# Patient Record
Sex: FEMALE | Race: WHITE | NOT HISPANIC OR LATINO | Employment: OTHER | URBAN - METROPOLITAN AREA
[De-identification: names, ages, dates, MRNs, and addresses within clinical notes are randomized per-mention and may not be internally consistent; named-entity substitution may affect disease eponyms.]

---

## 2017-01-12 ENCOUNTER — TELEPHONE (OUTPATIENT)
Dept: UROGYNECOLOGY | Facility: CLINIC | Age: 77
End: 2017-01-12

## 2017-01-12 NOTE — TELEPHONE ENCOUNTER
----- Message from Aura Jean sent at 1/10/2017  4:24 PM CST -----  Contact: Patient  x_ 1st Request  _ 2nd Request  _ 3rd Request    Who: NANCY BAGLEY [[id number]]    Why: Patient is returning a call from staff. Patient is needing a call back.    What Number to Call Back: Patient can be reached at [phone].    When to Expect a call back: (Before the end of the day)  -- if call after 3:00 call back will be tomorrow.

## 2017-01-13 ENCOUNTER — TELEPHONE (OUTPATIENT)
Dept: INTERNAL MEDICINE | Facility: CLINIC | Age: 77
End: 2017-01-13

## 2017-01-13 RX ORDER — NITROFURANTOIN 25; 75 MG/1; MG/1
100 CAPSULE ORAL 2 TIMES DAILY
Qty: 20 CAPSULE | Refills: 0 | Status: SHIPPED | OUTPATIENT
Start: 2017-01-13 | End: 2017-01-23

## 2017-01-13 NOTE — TELEPHONE ENCOUNTER
----- Message from Sapna Christian sent at 1/13/2017 12:32 PM CST -----  Contact: pt   X_  1st Request  _  2nd Request  _  3rd Request    Who:NANCY BAGLEY [[id number]]    Why: Patient states she would like to schedule a follow up visit Patient would also like to get a RX called in for a bladder infection called in to [facility] - [place] - [address]    What Number to Call Back: [phone]    When to Expect a call back: (Before the end of the day)   -- if call after 3:00 call back will be tomorrow.

## 2017-01-13 NOTE — TELEPHONE ENCOUNTER
Spoke with pt she states that she is having some U.T.I sympts. She states that she's also been having a slow flow(dribbels) urinary freq, urgency,pain while urinating. Pt also states that in November she had the same sympts. Pt is requesting a abx to be sent to her pharmacy. Please advise.

## 2017-01-14 NOTE — TELEPHONE ENCOUNTER
Spoke with patient.  PCP called in macrobid.  Advised her to see local MD to collect urine C&S if doesn't resolve in case not sensitive to macrobid.  No s/sx pyleo; given precautions.     Patient had CVA in June 2016.  Had unDx Afib. In Nov 2016 had card stent.      IS also having some PV dribbling, weak stream.  Is worried that she might have some changes.  Will assess at next visit 1/25.

## 2017-01-25 ENCOUNTER — OFFICE VISIT (OUTPATIENT)
Dept: UROGYNECOLOGY | Facility: CLINIC | Age: 77
End: 2017-01-25
Payer: MEDICARE

## 2017-01-25 ENCOUNTER — HOSPITAL ENCOUNTER (OUTPATIENT)
Dept: RADIOLOGY | Facility: OTHER | Age: 77
Discharge: HOME | End: 2017-01-25
Attending: NURSE PRACTITIONER
Payer: MEDICARE

## 2017-01-25 VITALS
DIASTOLIC BLOOD PRESSURE: 70 MMHG | SYSTOLIC BLOOD PRESSURE: 130 MMHG | WEIGHT: 160 LBS | BODY MASS INDEX: 25.71 KG/M2 | HEIGHT: 66 IN

## 2017-01-25 DIAGNOSIS — R39.15 URINARY URGENCY: Primary | ICD-10-CM

## 2017-01-25 DIAGNOSIS — N39.46 MIXED STRESS AND URGE URINARY INCONTINENCE: ICD-10-CM

## 2017-01-25 DIAGNOSIS — M94.9 DISORDER OF BONE AND CARTILAGE: ICD-10-CM

## 2017-01-25 DIAGNOSIS — N95.1 MENOPAUSAL SYMPTOMS: ICD-10-CM

## 2017-01-25 DIAGNOSIS — N81.89 PELVIC FLOOR WEAKNESS: ICD-10-CM

## 2017-01-25 DIAGNOSIS — N95.2 VAGINAL ATROPHY: ICD-10-CM

## 2017-01-25 DIAGNOSIS — M89.9 DISORDER OF BONE AND CARTILAGE: ICD-10-CM

## 2017-01-25 PROCEDURE — 99999 PR PBB SHADOW E&M-EST. PATIENT-LVL III: CPT | Mod: PBBFAC,,, | Performed by: OBSTETRICS & GYNECOLOGY

## 2017-01-25 PROCEDURE — 99213 OFFICE O/P EST LOW 20 MIN: CPT | Mod: S$PBB,,, | Performed by: OBSTETRICS & GYNECOLOGY

## 2017-01-25 PROCEDURE — 77080 DXA BONE DENSITY AXIAL: CPT | Mod: 26,,, | Performed by: RADIOLOGY

## 2017-01-25 PROCEDURE — 77080 DXA BONE DENSITY AXIAL: CPT | Mod: TC

## 2017-01-25 PROCEDURE — 99213 OFFICE O/P EST LOW 20 MIN: CPT | Mod: PBBFAC | Performed by: OBSTETRICS & GYNECOLOGY

## 2017-01-25 RX ORDER — PRAVASTATIN SODIUM 20 MG/1
TABLET ORAL
COMMUNITY
Start: 2017-01-05 | End: 2017-04-25 | Stop reason: SDUPTHER

## 2017-01-25 RX ORDER — ASPIRIN 81 MG/1
162 TABLET ORAL
COMMUNITY
End: 2018-04-10 | Stop reason: SDUPTHER

## 2017-01-25 RX ORDER — AMIODARONE HYDROCHLORIDE 200 MG/1
TABLET ORAL
COMMUNITY
Start: 2017-01-05 | End: 2018-04-10 | Stop reason: SDUPTHER

## 2017-01-25 RX ORDER — CLOPIDOGREL BISULFATE 75 MG/1
75 TABLET ORAL
COMMUNITY
End: 2018-04-10

## 2017-01-25 NOTE — MR AVS SNAPSHOT
[facility]  [address] [address]  Phone: [phone]                  Theresa Hook   2017 11:00 AM   Office Visit    Description:  Female : 1940   Provider:  Reyna Wheeler MD   Department:  [facility]           Reason for Visit     Follow-up           Diagnoses this Visit        Comments    Urinary urgency    -  Primary     Pelvic floor weakness         Vaginal atrophy         Disorder of bone and cartilage         Menopausal symptoms                To Do List           Goals (5 Years of Data)     None      [facility]     [facility] -  Assistance  Registered nurses in the [facility] provide clinical advisement, health education, appointment booking, and other advisory services.  Call for this free service at [phone].             Medications           Message regarding Medications     Verify the changes and/or additions to your medication regime listed below are the same as discussed with your clinician today.  If any of these changes or additions are incorrect, please notify your healthcare provider.        STOP taking these medications     diazepam (VALIUM) 10 MG Tab     lisinopril 10 MG tablet Take 1 tablet (10 mg total) by mouth once daily. 1 Tablet Oral Every morning           Verify that the below list of medications is an accurate representation of the medications you are currently taking.  If none reported, the list may be blank. If incorrect, please contact your healthcare provider. Carry this list with you in case of emergency.           Current Medications     aspirin (ASPIRIN LOW DOSE) 81 MG EC tablet 2 Tablet, Delayed Release (E.C.) Oral At bedtime    aspirin (ECOTRIN) 81 MG EC tablet Take 162 mg by mouth.    clopidogrel (PLAVIX) 75 mg tablet Take 75 mg by mouth.    pravastatin (PRAVACHOL) 20 MG tablet     triamcinolone acetonide 0.1% (KENALOG) 0.1 % cream Apply topically  "Twice daily.   disp:  60g tube AAA      amiodarone (PACERONE) 200 MG Tab     ammonium lactate 12 % Crea     clobetasol (TEMOVATE) 0.05 % external solution Apply topically. .  AAA scalp qd - bid    mometasone (ELOCON) 0.1 % cream Apply 1 application topically Use as needed.   face           Clinical Reference Information           Vital Signs - Last Recorded  Most recent update: 1/25/2017 11:27 AM by Enid Mac LPN    BP Ht Wt BMI       130/70 (BP Location: Left arm) 5' 6" (1.676 m) 72.6 kg (160 lb) 25.82 kg/m2       Blood Pressure          Most Recent Value    BP  130/70      Allergies as of 1/25/2017     Collagen    Erythromycin    Quinolones    Sulfa (Sulfonamide Antibiotics)    Tetracycline      Immunizations Administered on Date of Encounter - 1/25/2017     None      Orders Placed During Today's Visit      Normal Orders This Visit    Urine culture     Future Labs/Procedures Expected by Expires    DXA Bone Density Spine And Hip_Axial Skeleton  1/25/2017 1/25/2018      Instructions    1. Well women   --bone density ordered today    2.   Mixed urinary incontinence, urge > stress:    --Empty bladder every 3 hours.  Empty well: wait a minute, lean forward on toilet.    --Avoid dietary irritants (see sheet).  Keep diary x 3-5 days to determine your irritants.  --KEGELS: do 10 in AM and 10 in PM, holding each x 10 seconds.  When you feel urge to go, STOP, KEGEL, and when urge has passed, then go to bathroom.  We recommend pelvic floor physical therapy-- patient refuses at this time.     --URGE: consider anticholinergic.  Takes 2-4 weeks to see if will have effect.  For dry mouth: get sour, sugar free lozenge or gum.    --STRESS:  Pessary vs. Sling.     3. Menopausal symptoms:  --could try non-HRT in future; patient would like to wait, does not want to add meds right now    4.  Continue fiber gummies/high fiber diet.  Always try to minimize straining with bowel movements.     5.  RTC 1 year       "

## 2017-01-25 NOTE — PATIENT INSTRUCTIONS
1. Well women   --bone density ordered today    2.   Mixed urinary incontinence, urge > stress:    --Empty bladder every 3 hours.  Empty well: wait a minute, lean forward on toilet.    --Avoid dietary irritants (see sheet).  Keep diary x 3-5 days to determine your irritants.  --KEGELS: do 10 in AM and 10 in PM, holding each x 10 seconds.  When you feel urge to go, STOP, KEGEL, and when urge has passed, then go to bathroom.  We recommend pelvic floor physical therapy-- patient refuses at this time.     --URGE: consider anticholinergic.  Takes 2-4 weeks to see if will have effect.  For dry mouth: get sour, sugar free lozenge or gum.    --STRESS:  Pessary vs. Sling.     3. Menopausal symptoms:  --could try non-HRT in future; patient would like to wait, does not want to add meds right now    4.  Continue fiber gummies/high fiber diet.  Always try to minimize straining with bowel movements.     5.  RTC 1 year

## 2017-01-25 NOTE — PROGRESS NOTES
Urogyn follow up    [facility]  [address] [address]    Theresa Hook  [id number]  1940      Theresa Hook is a 76 y.o. here for a urogyn follow up.    10/22/15    Title of Operation:  1) Laparoscopic-assisted total vaginal hysterectomy  2) Laparoscopic bilateral salpingo-oophorectomy  3) Bilateral uterosacral vaginal vault suspension  4) Anterior repair  5) Placement of tension-free transobuturator midurethral sling, TOT/Monarc  6) Cystourethroscopy    Indications for Surgery:  1) UI: (+) SHAHLA (when really has sneezing/coughing) > (+) UUI (1st AM). (+) pads (liner):1/day, usually damp. Daytime frequency: Q 4-5 hours. Nocturia: No: (--) dysuria, (--) hematuria, (--) frequent UTIs. (--) complete bladder emptying. DV occasionally--small 2nd void. +slow stream.   2) POP: Present. below introitus. Symptoms:(--) +can feel it, but doesn't really bother. (--) vaginal bleeding. (--) vaginal discharge. (--) sexually active. (--) Vaginal dryness. (--) vaginal estrogen use. History of breast cancer- +E2 - radiation treatment only. She is not interested in pessary placement.   --POP-Q: Aa +3; Ba +3; C -5; Ap -1; Bp -1; D -7. Genital hiatus 3, perineal body 1, total vaginal length 10-11.  3) BM: (--) constipation/straining. (--) chronic diarrhea. (--) hematochezia. (--) fecal incontinence. (+) fecal smearing/urgency. (--) incomplete evacuation. Eats high fiber diet.    Changes from last visit:  1) UI: (+) SHAHLA (+) UUI (+) pads:2/day, usually minimum wetness Daytime frequency: Q 3 -4hours. Nocturia: No: . (--) dysuria, (--) hematuria, (--) frequent UTIs. (--) complete bladder emptying. Double voids. Did not go to pelvic floor PT.  --3. URETHRAL FUNCTION/STORAGE PHASE:  a. WITH prolapse reduction:  · CLPP (150 mL): Negative at 81 cm H20  · VLPP (150 mL): Negative at 71 cm H20  · CLPP (300 mL): Negative at 122 cm H20  · VLPP (300 mL): Negative at 91 cm H20  · CLPP (MAX ):  Negative at 105 cm H20  · VLPP (MAX): Negative at 85 cm H20  · With pves catheters removed: POS CLPP and VLPP at max cap  These findings are consistent with Positive urodynamic stress incontinence only at max cap with pves catheter removed.  Assessment:  UF normal. PF with elevated PVR, but other studies (clinic PVR and void after cysto) confirmed normal emptying. Compliance normal. Max capacity ~650 mL. DO (--). SHAHLA (+) only at max cap once pves catheter removed.     2) POP: Present. below introitus. Symptoms:(--) (--) vaginal bleeding. (--) vaginal discharge. (--) sexually active. (+) Vaginal dryness. (--) vaginal estrogen use.   --POP-Q: Aa +3; Ba +3; C -5; Ap -1; Bp -1; D -7. Genital hiatus 3, perineal body 1, total vaginal length 10-11.    3) BM: (--) constipation/straining. (--) chronic diarrhea. (--) hematochezia. (--) fecal incontinence. (+) fecal smearing/urgency. (--) incomplete evacuation. Does not use fiber supplementation.    4) h/o left BRCA:  Pelvic US 9/15:  The uterus is normal in size, measuring 7.5 x 3.1 x 4.2 cm. The endometrial stripe is uniform in thickness measuring 2 mm. Multiple scattered dystrophic calcifications are noted throughout the uterine parenchyma, similar to the prior. Numerous   nabothian cysts are again noted. The ovaries are not visualized possibly secondary to their small size or surgical removal.  --has has had some abd distension since POP started; has gone up clothing size; no N/V    Preoperative Diagnosis:  1.  Rectocele      2.  Vaginal atrophy     3.  Uterovaginal prolapse, incomplete     4.  Cystocele     5.  Mixed incontinence urge and stress (male)(female)       Postoperative Diagnosis:  1.  Rectocele      2.  Vaginal atrophy     3.  Uterovaginal prolapse, incomplete     4.  Cystocele     5.  Mixed incontinence urge and stress (male)(female)         Issues include:  Patient Active Problem List   Diagnosis    HTN (hypertension)    Interval gout    Breast cancer,  2005, left breast lumpectomy x 2, lymph nodes negative, XRT, tamoxifem 5 years.    Skin cancer, 2005 squamous cell right nasolabial fold.    [place] (hard of hearing)    Vaginal atrophy    Cystocele    Rectocele    Mixed stress and urge urinary incontinence    Uterovaginal prolapse    S/P laparoscopic assisted vaginal hysterectomy (LAVH)    Nocturia    Personal history of breast cancer    Urinary urgency    Pelvic floor weakness     History since last visit: Denies bleeding or discharge.  No pain, VB.  No s/sx POP.  Complains of hot flashes at night 2-3 times/ night.   Bladder issues: Rare UUI in the early morning hours when she wakes up to void  Feels like she is not emptying her bladder any more.  Complains of a slow stream. Complains of abdominal bloating.  Denies dysuria.  Voiding every 3-4 hours during the day.  Treated for UTI 2 weeks ago-- no urine collected  Bowel issues: No constipation. Managing with high fiber diet and gummies as needed.    She has had a CVA in 06/2016 and had cardiac stent placed 11/2016.    Well Woman:  Pap:2009-- normal-- post hysterectomy  Mammo:12/2016  Stable post-surgical scar in the left breast is benign-negative.  No suspicious finding in either breast.  Mammogram in 1 year is recommended.  Colonoscopy:10/2012- normal-- repeat 2019  Dexa:years ago      Medications:    Current Outpatient Prescriptions:     aspirin (ASPIRIN LOW DOSE) 81 MG EC tablet, 2 Tablet, Delayed Release (E.C.) Oral At bedtime, Disp: , Rfl:     aspirin (ECOTRIN) 81 MG EC tablet, Take 162 mg by mouth., Disp: , Rfl:     clopidogrel (PLAVIX) 75 mg tablet, Take 75 mg by mouth., Disp: , Rfl:     pravastatin (PRAVACHOL) 20 MG tablet, , Disp: , Rfl:     triamcinolone acetonide 0.1% (KENALOG) 0.1 % cream, Apply topically Twice daily.   disp:  60g tube AAA  , Disp: , Rfl:     amiodarone (PACERONE) 200 MG Tab, , Disp: , Rfl:     ammonium lactate 12 % Crea, , Disp: , Rfl:     clobetasol (TEMOVATE) 0.05 %  "external solution, Apply topically. .  AAA scalp qd - bid, Disp: , Rfl:     mometasone (ELOCON) 0.1 % cream, Apply 1 application topically Use as needed.   face, Disp: , Rfl:     ROS:  As per HPI.      Exam  Visit Vitals    /70 (BP Location: Left arm)    Ht 5' 6" (1.676 m)    Wt 72.6 kg (160 lb)    BMI 25.82 kg/m2     General: alert and oriented, no acute distress  Respiratory: normal respiratory effort  Abd: soft, non-tender, non-distended.      Pelvic  Ext. Genitalia: normal external genitalia. Normal bartholin's and skenes glands  Vagina: + atrophy. Normal vaginal mucosa without lesions. No discharge noted.   Non-tender bladder base without palpable mass.  No mesh palpable/visible.    Cervix: absent  Uterus:  absent   Urethra: no masses or tenderness  Urethral meatus: no lesions, caruncle or prolapse.    POP-Q: Aa/Ba -1; C -9; Ba/Bp -2, TVL 11 cm.           PVR 20 cc    Impression  1. Urinary urgency  Urine culture   2. Pelvic floor weakness     3. Vaginal atrophy     4. Disorder of bone and cartilage  DXA Bone Density Spine And Hip_Axial Skeleton   5. Menopausal symptoms     6. Mixed stress and urge urinary incontinence       We reviewed the above issues and discussed options for short-term versus long-term management of her problems.   Plan:   1. Well women   --bone density ordered today    2.   Mixed urinary incontinence, urge > stress:    --Empty bladder every 3 hours.  Empty well: wait a minute, lean forward on toilet.    --Avoid dietary irritants (see sheet).  Keep diary x 3-5 days to determine your irritants.  --KEGELS: do 10 in AM and 10 in PM, holding each x 10 seconds.  When you feel urge to go, STOP, KEGEL, and when urge has passed, then go to bathroom.  We recommend pelvic floor physical therapy-- patient refuses at this time.     --URGE: consider anticholinergic.  Takes 2-4 weeks to see if will have effect.  For dry mouth: get sour, sugar free lozenge or gum.    --STRESS:  Pessary vs. " Sling.     3. Menopausal symptoms:  --could try non-HRT in future; patient would like to wait, does not want to add meds right now    4.  Continue fiber gummies/high fiber diet.  Always try to minimize straining with bowel movements.     5.  RTC 1 year    .    30 minutes were spent in face to face time with this patient  90 % of this time was spent in counseling and/or coordination of care    MIGUELINA Esquivel-BC [facility]  Division of Female Pelvic Medicine and Reconstructive Surgery  Department of Obstetrics & Gynecology

## 2017-01-26 ENCOUNTER — TELEPHONE (OUTPATIENT)
Dept: UROGYNECOLOGY | Facility: CLINIC | Age: 77
End: 2017-01-26

## 2017-01-26 NOTE — TELEPHONE ENCOUNTER
Informed pt that her bone density test results was normal. Pt voiced understanding and call ended.

## 2017-01-26 NOTE — TELEPHONE ENCOUNTER
----- Message from Reyna Wheeler MD sent at 1/25/2017  8:10 PM CST -----  Please let patient know bone density test was normal.  Results released to [employer] portal.  Thanks!

## 2017-01-29 PROBLEM — R39.15 URINARY URGENCY: Status: ACTIVE | Noted: 2017-01-29

## 2017-01-29 PROBLEM — N81.89 PELVIC FLOOR WEAKNESS: Status: ACTIVE | Noted: 2017-01-29

## 2017-02-01 ENCOUNTER — TELEPHONE (OUTPATIENT)
Dept: INTERNAL MEDICINE | Facility: CLINIC | Age: 77
End: 2017-02-01

## 2017-02-01 DIAGNOSIS — R30.0 DYSURIA: Primary | ICD-10-CM

## 2017-02-01 RX ORDER — NITROFURANTOIN 25; 75 MG/1; MG/1
100 CAPSULE ORAL 2 TIMES DAILY
Qty: 20 CAPSULE | Refills: 0 | Status: SHIPPED | OUTPATIENT
Start: 2017-02-01 | End: 2017-02-11

## 2017-02-24 ENCOUNTER — TELEPHONE (OUTPATIENT)
Dept: INTERNAL MEDICINE | Facility: CLINIC | Age: 77
End: 2017-02-24

## 2017-02-24 DIAGNOSIS — N39.0 URINARY TRACT INFECTION WITHOUT HEMATURIA, SITE UNSPECIFIED: Primary | ICD-10-CM

## 2017-02-24 NOTE — TELEPHONE ENCOUNTER
----- Message from Sabrina Sepulveda sent at 2/24/2017  1:45 PM CST -----  Contact: Patient  The patient says an order was supposed to have been sent for a culture for a UTI after two rounds of antibiotics.  Please fax the order to [facility]. The  is Sarah (she leaves at 2:30) [phone]; fax [phone].    Thanks!

## 2017-03-15 ENCOUNTER — PATIENT MESSAGE (OUTPATIENT)
Dept: INTERNAL MEDICINE | Facility: CLINIC | Age: 77
End: 2017-03-15

## 2017-04-11 ENCOUNTER — TELEPHONE (OUTPATIENT)
Dept: INTERNAL MEDICINE | Facility: CLINIC | Age: 77
End: 2017-04-11

## 2017-04-11 NOTE — TELEPHONE ENCOUNTER
I left a VM message that I am not able to order tests to be performed at the [facility]

## 2017-04-11 NOTE — TELEPHONE ENCOUNTER
----- Message from Ron Banks MA sent at 4/11/2017 11:04 AM CDT -----  Contact: [phone]  Type: Orders Request    What orders/ testing are being requested? Chest X-Ray (Pt had a Fall)    Is there a future appointment scheduled for the patient with PCP? No    When?    Comments: Pt stated that she would like to do her X-Ray at [facility] in [place], [place].  Please advise and call. Thanks!

## 2017-04-12 ENCOUNTER — TELEPHONE (OUTPATIENT)
Dept: INTERNAL MEDICINE | Facility: CLINIC | Age: 77
End: 2017-04-12

## 2017-04-12 NOTE — TELEPHONE ENCOUNTER
----- Message from Anna Pro sent at 4/11/2017  2:56 PM CDT -----  Contact: self [phone]  Patient is calling to check status of order for chest Xray . Please advise , Thanks  !

## 2017-04-21 ENCOUNTER — TELEPHONE (OUTPATIENT)
Dept: INTERNAL MEDICINE | Facility: CLINIC | Age: 77
End: 2017-04-21

## 2017-04-25 ENCOUNTER — HOSPITAL ENCOUNTER (OUTPATIENT)
Dept: RADIOLOGY | Facility: HOSPITAL | Age: 77
Discharge: HOME | End: 2017-04-25
Attending: INTERNAL MEDICINE
Payer: MEDICARE

## 2017-04-25 ENCOUNTER — OFFICE VISIT (OUTPATIENT)
Dept: INTERNAL MEDICINE | Facility: CLINIC | Age: 77
End: 2017-04-25
Payer: MEDICARE

## 2017-04-25 VITALS
SYSTOLIC BLOOD PRESSURE: 130 MMHG | DIASTOLIC BLOOD PRESSURE: 80 MMHG | WEIGHT: 163.13 LBS | BODY MASS INDEX: 26.22 KG/M2 | HEART RATE: 74 BPM | OXYGEN SATURATION: 95 % | HEIGHT: 66 IN

## 2017-04-25 DIAGNOSIS — Z23 NEED FOR VACCINATION WITH 13-POLYVALENT PNEUMOCOCCAL CONJUGATE VACCINE: ICD-10-CM

## 2017-04-25 DIAGNOSIS — G31.84 AMNESTIC MCI (MILD COGNITIVE IMPAIRMENT WITH MEMORY LOSS): ICD-10-CM

## 2017-04-25 DIAGNOSIS — Z78.0 POSTMENOPAUSAL STATUS: ICD-10-CM

## 2017-04-25 DIAGNOSIS — M79.672 FOOT PAIN, BILATERAL: ICD-10-CM

## 2017-04-25 DIAGNOSIS — R26.89 IMBALANCE DUE TO OLD STROKE: ICD-10-CM

## 2017-04-25 DIAGNOSIS — C50.412 MALIGNANT NEOPLASM OF UPPER-OUTER QUADRANT OF LEFT FEMALE BREAST: Chronic | ICD-10-CM

## 2017-04-25 DIAGNOSIS — M25.571 CHRONIC PAIN OF BOTH ANKLES: ICD-10-CM

## 2017-04-25 DIAGNOSIS — R26.89 BALANCE PROBLEM: ICD-10-CM

## 2017-04-25 DIAGNOSIS — M10.9 INTERVAL GOUT: ICD-10-CM

## 2017-04-25 DIAGNOSIS — Z23 NEED FOR TDAP VACCINATION: ICD-10-CM

## 2017-04-25 DIAGNOSIS — M25.572 CHRONIC PAIN OF BOTH ANKLES: ICD-10-CM

## 2017-04-25 DIAGNOSIS — Z00.00 ANNUAL PHYSICAL EXAM: Primary | ICD-10-CM

## 2017-04-25 DIAGNOSIS — R20.2 NUMBNESS AND TINGLING OF BOTH FEET: ICD-10-CM

## 2017-04-25 DIAGNOSIS — R73.9 HYPERGLYCEMIA: ICD-10-CM

## 2017-04-25 DIAGNOSIS — M79.671 FOOT PAIN, BILATERAL: ICD-10-CM

## 2017-04-25 DIAGNOSIS — Z90.710 S/P LAPAROSCOPIC ASSISTED VAGINAL HYSTERECTOMY (LAVH): ICD-10-CM

## 2017-04-25 DIAGNOSIS — R20.0 NUMBNESS AND TINGLING OF BOTH FEET: ICD-10-CM

## 2017-04-25 DIAGNOSIS — E53.8 VITAMIN B 12 DEFICIENCY: ICD-10-CM
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
## 2017-04-25 DIAGNOSIS — G89.29 CHRONIC PAIN OF BOTH ANKLES: ICD-10-CM

## 2017-04-25 DIAGNOSIS — Z95.5 S/P CORONARY ARTERY STENT PLACEMENT: ICD-10-CM

## 2017-04-25 DIAGNOSIS — M19.079 ARTHROPATHY OF ANKLE AND FOOT: ICD-10-CM

## 2017-04-25 DIAGNOSIS — I69.398 IMBALANCE DUE TO OLD STROKE: ICD-10-CM

## 2017-04-25 DIAGNOSIS — D50.9 IRON DEFICIENCY ANEMIA, UNSPECIFIED IRON DEFICIENCY ANEMIA TYPE: ICD-10-CM

## 2017-04-25 DIAGNOSIS — R39.15 URINARY URGENCY: ICD-10-CM

## 2017-04-25 DIAGNOSIS — R47.89 WORD FINDING DIFFICULTY: ICD-10-CM

## 2017-04-25 DIAGNOSIS — R60.0 EDEMA, PERIPHERAL: ICD-10-CM

## 2017-04-25 DIAGNOSIS — Z86.73 OLD EMBOLIC STROKE WITHOUT LATE EFFECT: ICD-10-CM

## 2017-04-25 DIAGNOSIS — N39.0 URINARY TRACT INFECTION WITHOUT HEMATURIA, SITE UNSPECIFIED: ICD-10-CM

## 2017-04-25 DIAGNOSIS — I10 ESSENTIAL HYPERTENSION: Chronic | ICD-10-CM

## 2017-04-25 DIAGNOSIS — I48.0 PAROXYSMAL ATRIAL FIBRILLATION: ICD-10-CM

## 2017-04-25 LAB
AMORPH CRY UR QL COMP ASSIST: ABNORMAL
BACTERIA #/AREA URNS AUTO: ABNORMAL /HPF
BILIRUB UR QL STRIP: NEGATIVE
CLARITY UR REFRACT.AUTO: ABNORMAL
COLOR UR AUTO: YELLOW
GLUCOSE UR QL STRIP: NEGATIVE
HGB UR QL STRIP: NEGATIVE
KETONES UR QL STRIP: NEGATIVE
LEUKOCYTE ESTERASE UR QL STRIP: ABNORMAL
MICROSCOPIC COMMENT: ABNORMAL
NITRITE UR QL STRIP: POSITIVE
NON-SQ EPI CELLS #/AREA URNS AUTO: 1 /HPF
PH UR STRIP: 7 [PH] (ref 5–8)
PROT UR QL STRIP: NEGATIVE
RBC #/AREA URNS AUTO: 12 /HPF (ref 0–4)
SP GR UR STRIP: 1.01 (ref 1–1.03)
SQUAMOUS #/AREA URNS AUTO: 1 /HPF
URN SPEC COLLECT METH UR: ABNORMAL
UROBILINOGEN UR STRIP-ACNC: NEGATIVE EU/DL
WBC #/AREA URNS AUTO: >100 /HPF (ref 0–5)
WBC CLUMPS UR QL AUTO: ABNORMAL

## 2017-04-25 PROCEDURE — 99999 PR PBB SHADOW E&M-EST. PATIENT-LVL IV: CPT | Mod: PBBFAC,,, | Performed by: INTERNAL MEDICINE

## 2017-04-25 PROCEDURE — 73610 X-RAY EXAM OF ANKLE: CPT | Mod: 50,TC

## 2017-04-25 PROCEDURE — 73620 X-RAY EXAM OF FOOT: CPT | Mod: TC,50,LT

## 2017-04-25 PROCEDURE — 73610 X-RAY EXAM OF ANKLE: CPT | Mod: 26,50,, | Performed by: RADIOLOGY

## 2017-04-25 PROCEDURE — 73620 X-RAY EXAM OF FOOT: CPT | Mod: 26,50,, | Performed by: RADIOLOGY

## 2017-04-25 PROCEDURE — 99397 PER PM REEVAL EST PAT 65+ YR: CPT | Mod: S$PBB,,, | Performed by: INTERNAL MEDICINE

## 2017-04-25 RX ORDER — PHENYLPROPANOLAMINE/CLEMASTINE 75-1.34MG
TABLET, EXTENDED RELEASE ORAL
COMMUNITY
End: 2017-04-27 | Stop reason: ALTCHOICE

## 2017-04-25 RX ORDER — POTASSIUM &MAGNESIUM ASPARTATE 250-250 MG
CAPSULE ORAL
COMMUNITY
End: 2017-12-13

## 2017-04-25 RX ORDER — PRAVASTATIN SODIUM 20 MG/1
TABLET ORAL
COMMUNITY
End: 2018-07-24 | Stop reason: SDUPTHER

## 2017-04-25 RX ORDER — AMIODARONE HYDROCHLORIDE 100 MG/1
TABLET ORAL
COMMUNITY
End: 2017-04-25 | Stop reason: SDUPTHER

## 2017-04-25 RX ORDER — CETIRIZINE HYDROCHLORIDE 10 MG/1
10 TABLET ORAL DAILY
COMMUNITY
End: 2021-03-30 | Stop reason: ALTCHOICE

## 2017-04-25 NOTE — PATIENT INSTRUCTIONS
Understanding Plantar Fasciitis    Plantar fasciitis is a condition that causes foot and heel pain. The plantar fascia is a tough band of tissue that runs across the bottom of the foot from the heel to the toes. This tissue pulls on the heel bone. It supports the arch of the foot as it pushes off the ground. If the tissue becomes irritated or red and swollen (inflamed), it is called plantar fasciitis.  How to say it  PLAN-tuhr fa-see-IY-[id number]   What causes plantar fasciitis?  Plantar fasciitis most often occurs from overusing the plantar fascia. The tissue may become damaged from activities that put repeated stress on the heel and foot. Or it may wear down over time with age and ankle stiffness. You are more likely to have plantar fasciitis if you:  · Do activities that require a lot of running, jumping, or dancing  · Have a job that requires being on your feet for long periods  · Are overweight or obese  · Have certain foot problems, such as a tight Achilles tendon, flat feet, or high arches  · Often wear poorly fitting shoes  Symptoms of plantar fasciitis  The condition most often causes pain in the heel and the bottom of the foot. The pain may occur when you take your first steps in the morning. It may get better as you walk throughout the day. But as you continue to put weight on the foot, the pain often returns. Pain may also occur after standing or sitting for long periods.  Treating plantar fasciitis  Treatments for plantar fasciitis include:  · Resting the foot. This involves limiting movements that make your foot hurt. You may also need to avoid certain sports and types of work for a time.  · Using cold packs. Put an ice pack on the heel and foot to help reduce pain and swelling.  · Taking pain medicines. Prescription and over-the-counter pain medicines can help relieve pain and swelling.  · Using heel cups or foot inserts (orthotics). These are placed in the shoes to help support the heel or arch and  cushion the heel. You may also be told to buy proper-fitting shoes with good arch support and cushioned soles.  · Taping the foot. This supports the arch and limits the movement of the plantar fascia to help relieve symptoms.  · Wearing a night splint. This stretches the plantar fascia and leg muscles while you sleep. This may help relieve pain.  · Doing exercises and physical therapy. These stretch and strengthen the plantar fascia and the muscles in the leg that support the heel and foot.  · Getting shots of medicine into the foot. These may help relieve symptoms for a time.  · Having surgery. This may be needed if other treatments fail to relieve symptoms. During surgery, the surgeon may partially cut the plantar fascia to release tension.  Possible complications of plantar fasciitis  Without proper care and treatment, healing may take longer than normal. Also, symptoms may continue or get worse. Over time, the plantar fascia may be damaged. This can make it hard to walk or even stand without pain.  When to call your healthcare provider  Call your healthcare provider right away if you have any of these:  · Fever of 100.4°F (38°C) or higher, or as directed  · Symptoms that dont get better with treatment, or get worse  · New symptoms, such as numbness, tingling, or weakness in the foot   Date Last Reviewed: 3/10/2016  © 1264-0559 [employer]. [address] [place] [address]. All rights reserved. This information is not intended as a substitute for professional medical care. Always follow your healthcare professional's instructions.        Toe Extension (Flexibility)    These instructions are for your right foot. Switch sides for your left foot.  1. Sit in a chair. Rest your right ankle on your left knee.  2. Hold your toes with your right hand. Gently bend the toes backward. Feel a stretch in the undersides of the toes and ball of the foot. Hold for 30 to 60 seconds.  3. Then gently bend  the toes in the other direction. Gently press on them until your foot is pointed. Hold for 30 to 60 seconds.  4. Repeat 5 times, or as instructed.  Date Last Reviewed: 5/1/2016  © 3073-7615 [employer]. [address] [place] [address], [place] [address]. All rights reserved. This information is not intended as a substitute for professional medical care. Always follow your healthcare professional's instructions.

## 2017-04-25 NOTE — MR AVS SNAPSHOT
[facility]  [address]  [facility] [address]  Phone: [phone]  Fax: [phone]                  Theresa Hook   2017 1:00 PM   Office Visit    Description:  Female : 1940   Provider:  Manuela Robertson MD   Department:  [facility]           Reason for Visit     Annual Exam           Diagnoses this Visit        Comments    Edema, peripheral    -  Primary     Numbness and tingling of both feet         Arthropathy of ankle and foot         Urinary tract infection without hematuria, site unspecified         Fall, initial encounter         Malignant neoplasm of upper-outer quadrant of left female breast         Essential hypertension         Interval gout         Imbalance due to old stroke         Need for Tdap vaccination         Need for vaccination with 13-polyvalent pneumococcal conjugate vaccine         S/P laparoscopic assisted vaginal hysterectomy (LAVH)         Paroxysmal atrial fibrillation         Old embolic stroke without late effect         S/P coronary artery stent placement         Urinary urgency         Annual physical exam         Word finding difficulty         Amnestic MCI (mild cognitive impairment with memory loss)         Balance problem         Postmenopausal status         Foot pain, bilateral         Chronic pain of both ankles         Hyperglycemia         Vitamin B 12 deficiency         Iron deficiency anemia, unspecified iron deficiency anemia type                To Do List           Goals (5 Years of Data)     None      Follow-Up and Disposition     Return in about 3 months (around 2017).      [facility]     [facility]  Assistance  Unless otherwise directed by your provider, please contact [facility], our nurse care line that is available for  assistance.     Registered nurses in the [facility] provide: appointment scheduling, clinical advisement, health  "education, and other advisory services.  Call: [phone] (toll free)               Medications           Message regarding Medications     Verify the changes and/or additions to your medication regime listed below are the same as discussed with your clinician today.  If any of these changes or additions are incorrect, please notify your healthcare provider.             Verify that the below list of medications is an accurate representation of the medications you are currently taking.  If none reported, the list may be blank. If incorrect, please contact your healthcare provider. Carry this list with you in case of emergency.           Current Medications     amiodarone (PACERONE) 200 MG Tab     ammonium lactate 12 % Crea     aspirin (ASPIRIN LOW DOSE) 81 MG EC tablet 2 Tablet, Delayed Release (E.C.) Oral At bedtime    aspirin (ECOTRIN) 81 MG EC tablet Take 162 mg by mouth.    cetirizine (ZYRTEC) 10 MG tablet Take 10 mg by mouth once daily.    clobetasol (TEMOVATE) 0.05 % external solution Apply topically. .  AAA scalp qd - bid    clopidogrel (PLAVIX) 75 mg tablet Take 75 mg by mouth.    COENZYME Q10-L-CARNITINE-VIT E (HEALTHY HEART FORMULA ORAL) Take by mouth once daily.    cranberry 500 mg Cap Take by mouth.    ibuprofen 200 mg Cap Take by mouth.    mometasone (ELOCON) 0.1 % cream Apply 1 application topically Use as needed.   face    pravastatin (PRAVACHOL) 20 MG tablet Take by mouth.    triamcinolone acetonide 0.1% (KENALOG) 0.1 % cream Apply topically Twice daily.   disp:  60g tube AAA             Clinical Reference Information           Your Vitals Were     BP Pulse Height Weight SpO2 BMI    130/80 74 5' 6" (1.676 m) 74 kg (163 lb 2.3 oz) 95% 26.33 kg/m2      Blood Pressure          Most Recent Value    BP  130/80      Allergies as of 4/25/2017     Collagen    Erythromycin    Quinolones    Sulfa (Sulfonamide Antibiotics)    Sulfacetamide Sodium    Tetracycline      Immunizations Administered on Date of " Encounter - 4/25/2017     None      Orders Placed During Today's Visit      Normal Orders This Visit    Urinalysis     Urine culture     Future Labs/Procedures Expected by Expires    CBC auto differential  4/25/2017 4/25/2018    Comprehensive metabolic panel  4/25/2017 6/24/2018    Ferritin  4/25/2017 4/25/2018    Hemoglobin A1c  4/25/2017 6/24/2018    MRI Brain W WO Contrast  4/25/2017 4/25/2018    Uric acid  4/25/2017 6/24/2018    Vitamin B12  4/25/2017 6/24/2018    X-Ray Ankle Complete 3 View Bilateral  4/25/2017 4/25/2018    X-Ray Foot AP Bilateral  4/25/2017 4/25/2018      Instructions      Understanding Plantar Fasciitis    Plantar fasciitis is a condition that causes foot and heel pain. The plantar fascia is a tough band of tissue that runs across the bottom of the foot from the heel to the toes. This tissue pulls on the heel bone. It supports the arch of the foot as it pushes off the ground. If the tissue becomes irritated or red and swollen (inflamed), it is called plantar fasciitis.  How to say it  PLAN-tuhr fa-see-IY-tis   What causes plantar fasciitis?  Plantar fasciitis most often occurs from overusing the plantar fascia. The tissue may become damaged from activities that put repeated stress on the heel and foot. Or it may wear down over time with age and ankle stiffness. You are more likely to have plantar fasciitis if you:  · Do activities that require a lot of running, jumping, or dancing  · Have a job that requires being on your feet for long periods  · Are overweight or obese  · Have certain foot problems, such as a tight Achilles tendon, flat feet, or high arches  · Often wear poorly fitting shoes  Symptoms of plantar fasciitis  The condition most often causes pain in the heel and the bottom of the foot. The pain may occur when you take your first steps in the morning. It may get better as you walk throughout the day. But as you continue to put weight on the foot, the pain often returns. Pain may  also occur after standing or sitting for long periods.  Treating plantar fasciitis  Treatments for plantar fasciitis include:  · Resting the foot. This involves limiting movements that make your foot hurt. You may also need to avoid certain sports and types of work for a time.  · Using cold packs. Put an ice pack on the heel and foot to help reduce pain and swelling.  · Taking pain medicines. Prescription and over-the-counter pain medicines can help relieve pain and swelling.  · Using heel cups or foot inserts (orthotics). These are placed in the shoes to help support the heel or arch and cushion the heel. You may also be told to buy proper-fitting shoes with good arch support and cushioned soles.  · Taping the foot. This supports the arch and limits the movement of the plantar fascia to help relieve symptoms.  · Wearing a night splint. This stretches the plantar fascia and leg muscles while you sleep. This may help relieve pain.  · Doing exercises and physical therapy. These stretch and strengthen the plantar fascia and the muscles in the leg that support the heel and foot.  · Getting shots of medicine into the foot. These may help relieve symptoms for a time.  · Having surgery. This may be needed if other treatments fail to relieve symptoms. During surgery, the surgeon may partially cut the plantar fascia to release tension.  Possible complications of plantar fasciitis  Without proper care and treatment, healing may take longer than normal. Also, symptoms may continue or get worse. Over time, the plantar fascia may be damaged. This can make it hard to walk or even stand without pain.  When to call your healthcare provider  Call your healthcare provider right away if you have any of these:  · Fever of 100.4°F (38°C) or higher, or as directed  · Symptoms that dont get better with treatment, or get worse  · New symptoms, such as numbness, tingling, or weakness in the foot   Date Last Reviewed: 3/10/2016  © 8571-8658  [employer]. [address] [place] [address]. All rights reserved. This information is not intended as a substitute for professional medical care. Always follow your healthcare professional's instructions.        Toe Extension (Flexibility)    These instructions are for your right foot. Switch sides for your left foot.  1. Sit in a chair. Rest your right ankle on your left knee.  2. Hold your toes with your right hand. Gently bend the toes backward. Feel a stretch in the undersides of the toes and ball of the foot. Hold for 30 to 60 seconds.  3. Then gently bend the toes in the other direction. Gently press on them until your foot is pointed. Hold for 30 to 60 seconds.  4. Repeat 5 times, or as instructed.  Date Last Reviewed: 5/1/2016  © 8239-2358 [employer]. [address] [place] [address]. All rights reserved. This information is not intended as a substitute for professional medical care. Always follow your healthcare professional's instructions.             Language Assistance Services     ATTENTION: Language assistance services are available, free of charge. Please call [phone].      ATENCIÓN: Si habla hardeep, tiene a aparicio disposición servicios gratuitos de asistencia lingüística. Llame al [phone].     ARLENE Ý: N?u b?n nói Ti?ng Vi?t, có các d?ch v? h? tr? ngôn ng? mi?n phí dành cho b?n. G?i s? [phone].         [facility] complies with applicable Federal civil rights laws and does not discriminate on the basis of race, color, national origin, age, disability, or sex.

## 2017-04-26 ENCOUNTER — PATIENT MESSAGE (OUTPATIENT)
Dept: INTERNAL MEDICINE | Facility: CLINIC | Age: 77
End: 2017-04-26

## 2017-04-26 ENCOUNTER — TELEPHONE (OUTPATIENT)
Dept: INTERNAL MEDICINE | Facility: CLINIC | Age: 77
End: 2017-04-26

## 2017-04-27 RX ORDER — MELOXICAM 15 MG/1
15 TABLET ORAL DAILY PRN
Qty: 30 TABLET | Refills: 0 | Status: SHIPPED | OUTPATIENT
Start: 2017-04-27 | End: 2017-05-02

## 2017-04-27 RX ORDER — NITROFURANTOIN 25; 75 MG/1; MG/1
100 CAPSULE ORAL 2 TIMES DAILY
Qty: 14 CAPSULE | Refills: 1 | Status: SHIPPED | OUTPATIENT
Start: 2017-04-27 | End: 2017-10-13 | Stop reason: SDUPTHER

## 2017-04-27 NOTE — PROGRESS NOTES
Subjective:       Patient ID: Theresa Hook is a 76 y.o. female.    Chief Complaint: Annual Exam  wellnes  Entire chart reviewed, PMx, FHx, and Social History updated and reviewed.    HPI  Review of Systems   Constitutional: Positive for activity change and appetite change. Negative for chills, fatigue, fever and unexpected weight change.   HENT: Negative for dental problem, hearing loss, tinnitus, trouble swallowing and voice change.    Eyes: Negative for visual disturbance.   Respiratory: Negative for cough, chest tightness, shortness of breath and wheezing.    Cardiovascular: Negative for chest pain, palpitations and leg swelling.   Gastrointestinal: Negative for abdominal pain, blood in stool, constipation, diarrhea and nausea.   Genitourinary: Positive for dysuria, frequency and urgency. Negative for difficulty urinating.   Musculoskeletal: Positive for arthralgias, back pain, gait problem and joint swelling. Negative for myalgias, neck pain and neck stiffness.   Skin: Negative for rash.   Neurological: Positive for numbness. Negative for dizziness, tremors, speech difficulty, weakness, light-headedness and headaches.   Psychiatric/Behavioral: Negative for dysphoric mood and sleep disturbance. The patient is not nervous/anxious.        Objective:      Physical Exam   Constitutional: She appears well-nourished.   HENT:   Head: Atraumatic.   Eyes: Conjunctivae are normal. No scleral icterus.   Neck: Neck supple.   Cardiovascular: Normal rate and regular rhythm.    Pulmonary/Chest: Effort normal and breath sounds normal.   Abdominal: Soft. There is no tenderness.   Musculoskeletal: She exhibits no edema.   Lymphadenopathy:     She has no cervical adenopathy.   Neurological: She is alert.   Skin: Skin is warm and dry.   Psychiatric: She has a normal mood and affect. Her behavior is normal.       Assessment:       1. Annual physical exam    2. Edema, peripheral, worse on right    3. tingling of both feet     4. Arthropathy of ankle and foot    5. Urinary tract infection without hematuria, site unspecified    6. Fall, initial encounter    7. Malignant neoplasm of upper-outer quadrant of left female breast    8. Essential hypertension    9. Interval gout    10. Imbalance due to old stroke    11. Need for Tdap vaccination    12. Need for vaccination with 13-polyvalent pneumococcal conjugate vaccine    13. S/P laparoscopic assisted vaginal hysterectomy (LAVH)    14. Paroxysmal atrial fibrillation    15. Old embolic stroke without late effect, assoc a fib     16. S/P coronary artery stent placement,     17. Urinary urgency    18. Word finding difficulty    19. Amnestic MCI (mild cognitive impairment with memory loss)    20. Balance problem    21. Postmenopausal status    22. Foot pain, bilateral    23. Chronic pain of both ankles    24. Hyperglycemia    25. Vitamin B 12 deficiency    26. Iron deficiency anemia, unspecified iron deficiency anemia type        Plan:   Theresa was seen today for annual exam.    Diagnoses and all orders for this visit:    Annual physical exam    Edema, peripheral, worse on right    tingling of both feet    Arthropathy of ankle and foot    Urinary tract infection without hematuria, site unspecified  -     Urine culture  -     Urinalysis    Fall, initial encounter    Malignant neoplasm of upper-outer quadrant of left female breast    Essential hypertension  -     Comprehensive metabolic panel; Future  -     Ferritin; Future    Interval gout  -     CBC auto differential; Future  -     Uric acid; Future    Imbalance due to old stroke    Need for Tdap vaccination    Need for vaccination with 13-polyvalent pneumococcal conjugate vaccine    S/P laparoscopic assisted vaginal hysterectomy (LAVH)    Paroxysmal atrial fibrillation  -     MRI Brain W WO Contrast; Future  -     Comprehensive metabolic panel; Future    Old embolic stroke without late effect, assoc a fib   -     MRI Brain  W WO Contrast; Future    S/P coronary artery stent placement,   -     MRI Brain W WO Contrast; Future    Urinary urgency    Word finding difficulty  -     MRI Brain W WO Contrast; Future    Amnestic MCI (mild cognitive impairment with memory loss)  -     MRI Brain W WO Contrast; Future    Balance problem  -     MRI Brain W WO Contrast; Future    Postmenopausal status    Foot pain, bilateral  -     X-Ray Foot AP Bilateral; Future    Chronic pain of both ankles  -     X-Ray Ankle Complete 3 View Bilateral; Future    Hyperglycemia  -     Hemoglobin A1c; Future    Vitamin B 12 deficiency  -     Vitamin B12; Future    Iron deficiency anemia, unspecified iron deficiency anemia type  -     CBC auto differential; Future  -     Ferritin; Future    Other orders  -     Urinalysis Microscopic    Return in about 3 months (around 7/25/2017).  She is taking Plavix 75 mg, amiodarone 100 mg, pravastatin 20 mg, aspirin 81 mg daily.  She is concerned about word finding difficulty, balance problems, hearing loss,residual forgetfulness following the stroke a year ago.  She needs a tetanus and PCV 13

## 2017-04-28 LAB — BACTERIA UR CULT: NORMAL

## 2017-05-01 ENCOUNTER — TELEPHONE (OUTPATIENT)
Dept: INTERNAL MEDICINE | Facility: CLINIC | Age: 77
End: 2017-05-01

## 2017-05-01 NOTE — TELEPHONE ENCOUNTER
----- Message from Kody Cha sent at 5/1/2017 10:53 AM CDT -----  Contact: Self [phone]  Pt stated that the Rx  meloxicam (MOBIC) 15 MG tablet, she cannot take because of her  Stroke History, requesting a call back, advice     Thanks

## 2017-05-03 ENCOUNTER — PATIENT MESSAGE (OUTPATIENT)
Dept: INTERNAL MEDICINE | Facility: CLINIC | Age: 77
End: 2017-05-03

## 2017-05-03 ENCOUNTER — TELEPHONE (OUTPATIENT)
Dept: INTERNAL MEDICINE | Facility: CLINIC | Age: 77
End: 2017-05-03

## 2017-05-03 RX ORDER — TRAMADOL HYDROCHLORIDE 50 MG/1
50 TABLET ORAL EVERY 6 HOURS PRN
Qty: 60 TABLET | Refills: 0 | Status: SHIPPED | OUTPATIENT
Start: 2017-05-03 | End: 2017-05-13

## 2017-05-03 NOTE — TELEPHONE ENCOUNTER
She calls and is having tremendous foot and ankle pain.  We discussed symptomatic treatment with tramadol, hopefully this is safe, may provide some relief until she can get in to determine the cause.  Tramadol prescription printed and please fax.  [place]

## 2017-05-03 NOTE — TELEPHONE ENCOUNTER
----- Message from Anna Pro sent at 5/2/2017  2:32 PM CDT -----  Contact: self [phone]  Patient would like a call back form the office to discuss medication she need to take , stated she cant do over the counter . Please advise , Thanks !

## 2017-05-06 ENCOUNTER — HOSPITAL ENCOUNTER (OUTPATIENT)
Dept: RADIOLOGY | Facility: HOSPITAL | Age: 77
Discharge: HOME | End: 2017-05-06
Attending: INTERNAL MEDICINE
Payer: MEDICARE

## 2017-05-06 DIAGNOSIS — Z95.5 S/P CORONARY ARTERY STENT PLACEMENT: ICD-10-CM

## 2017-05-06 DIAGNOSIS — R26.89 BALANCE PROBLEM: ICD-10-CM

## 2017-05-06 DIAGNOSIS — R47.89 WORD FINDING DIFFICULTY: ICD-10-CM

## 2017-05-06 DIAGNOSIS — Z86.73 OLD EMBOLIC STROKE WITHOUT LATE EFFECT: ICD-10-CM

## 2017-05-06 DIAGNOSIS — I48.0 PAROXYSMAL ATRIAL FIBRILLATION: ICD-10-CM

## 2017-05-06 DIAGNOSIS — G31.84 AMNESTIC MCI (MILD COGNITIVE IMPAIRMENT WITH MEMORY LOSS): ICD-10-CM

## 2017-05-06 PROCEDURE — 70553 MRI BRAIN STEM W/O & W/DYE: CPT | Mod: TC

## 2017-05-06 PROCEDURE — 25500020 PHARM REV CODE 255: Performed by: INTERNAL MEDICINE

## 2017-05-06 PROCEDURE — 70553 MRI BRAIN STEM W/O & W/DYE: CPT | Mod: 26,GC,, | Performed by: RADIOLOGY

## 2017-05-06 PROCEDURE — A9585 GADOBUTROL INJECTION: HCPCS | Performed by: INTERNAL MEDICINE

## 2017-05-06 RX ORDER — GADOBUTROL 604.72 MG/ML
8 INJECTION INTRAVENOUS
Status: COMPLETED | OUTPATIENT
Start: 2017-05-06 | End: 2017-05-06

## 2017-05-06 RX ADMIN — GADOBUTROL 8 ML: 604.72 INJECTION INTRAVENOUS at 12:05

## 2017-07-26 ENCOUNTER — TELEPHONE (OUTPATIENT)
Dept: INTERNAL MEDICINE | Facility: CLINIC | Age: 77
End: 2017-07-26

## 2017-07-26 ENCOUNTER — OFFICE VISIT (OUTPATIENT)
Dept: INTERNAL MEDICINE | Facility: CLINIC | Age: 77
End: 2017-07-26
Payer: MEDICARE

## 2017-07-26 VITALS
WEIGHT: 156.5 LBS | SYSTOLIC BLOOD PRESSURE: 138 MMHG | HEIGHT: 66 IN | DIASTOLIC BLOOD PRESSURE: 62 MMHG | BODY MASS INDEX: 25.15 KG/M2 | HEART RATE: 59 BPM

## 2017-07-26 DIAGNOSIS — I48.0 PAROXYSMAL ATRIAL FIBRILLATION: ICD-10-CM

## 2017-07-26 DIAGNOSIS — Z95.5 S/P CORONARY ARTERY STENT PLACEMENT: ICD-10-CM

## 2017-07-26 DIAGNOSIS — Z86.73 OLD EMBOLIC STROKE WITHOUT LATE EFFECT: ICD-10-CM

## 2017-07-26 DIAGNOSIS — I69.398 IMBALANCE DUE TO OLD STROKE: ICD-10-CM

## 2017-07-26 DIAGNOSIS — G31.84 AMNESTIC MCI (MILD COGNITIVE IMPAIRMENT WITH MEMORY LOSS): Primary | ICD-10-CM

## 2017-07-26 DIAGNOSIS — R26.89 IMBALANCE DUE TO OLD STROKE: ICD-10-CM

## 2017-07-26 PROCEDURE — 99214 OFFICE O/P EST MOD 30 MIN: CPT | Mod: S$PBB,,, | Performed by: INTERNAL MEDICINE

## 2017-07-26 PROCEDURE — 99999 PR PBB SHADOW E&M-EST. PATIENT-LVL III: CPT | Mod: PBBFAC,,, | Performed by: INTERNAL MEDICINE

## 2017-07-26 PROCEDURE — 1159F MED LIST DOCD IN RCRD: CPT | Mod: ,,, | Performed by: INTERNAL MEDICINE

## 2017-07-26 PROCEDURE — 99213 OFFICE O/P EST LOW 20 MIN: CPT | Mod: PBBFAC | Performed by: INTERNAL MEDICINE

## 2017-07-26 PROCEDURE — 1125F AMNT PAIN NOTED PAIN PRSNT: CPT | Mod: ,,, | Performed by: INTERNAL MEDICINE

## 2017-07-26 RX ORDER — TRAMADOL HYDROCHLORIDE 50 MG/1
50 TABLET ORAL EVERY 6 HOURS PRN
COMMUNITY
End: 2017-07-26 | Stop reason: SDUPTHER

## 2017-07-26 RX ORDER — FUROSEMIDE 20 MG/1
20 TABLET ORAL 2 TIMES DAILY
COMMUNITY
End: 2018-01-15

## 2017-07-26 RX ORDER — DONEPEZIL HYDROCHLORIDE 5 MG/1
5 TABLET, FILM COATED ORAL NIGHTLY
Qty: 30 TABLET | Refills: 11 | Status: SHIPPED | OUTPATIENT
Start: 2017-07-26 | End: 2017-09-13 | Stop reason: SDUPTHER

## 2017-07-26 RX ORDER — TRAMADOL HYDROCHLORIDE 50 MG/1
50 TABLET ORAL EVERY 6 HOURS PRN
Qty: 60 TABLET | Refills: 1 | Status: SHIPPED | OUTPATIENT
Start: 2017-07-26 | End: 2017-09-13

## 2017-07-26 NOTE — PATIENT INSTRUCTIONS
Donepezil tablets  What is this medicine?  DONEPEZIL (taylor NEP e zil) is used to treat mild to moderate dementia caused by Alzheimer's disease.  How should I use this medicine?  Take this medicine by mouth with a glass of water. Follow the directions on the prescription label. You may take this medicine with or without food. Take this medicine at regular intervals. This medicine is usually taken before bedtime. Do not take it more often than directed. Continue to take your medicine even if you feel better. Do not stop taking except on your doctor's advice.  If you are taking the 23 mg donepezil tablet, swallow it whole; do not cut, crush, or chew it.  Talk to your pediatrician regarding the use of this medicine in children. Special care may be needed.  What side effects may I notice from receiving this medicine?  Side effects that you should report to your doctor or health care professional as soon as possible:  · allergic reactions like skin rash, itching or hives, swelling of the face, lips, or tongue  · changes in vision  · feeling faint or lightheaded, falls  · problems with balance  · redness, blistering, peeling or loosening of the skin, including inside the mouth  · slow heartbeat, or palpitations  · stomach pain  · unusual bleeding or bruising, red or purple spots on the skin  · vomiting  · weight loss  Side effects that usually do not require medical attention (report to your doctor or health care professional if they continue or are bothersome):  · diarrhea, especially when starting treatment  · headache  · indigestion or heartburn  · loss of appetite  · muscle cramps  · nausea  What may interact with this medicine?  Do not take this medicine with any of the following medications:  · certain medicines for fungal infections like itraconazole, fluconazole, posaconazole, and voriconazole  · cisapride  · dextromethorphan;  quinidine  · dofetilide  · dronedarone  · pimozide  · quinidine  · thioridazine  · ziprasidone  This medicine may also interact with the following medications:  · antihistamines for allergy, cough and cold  · atropine  · bethanechol  · carbamazepine  · certain medicines for bladder problems like oxybutynin, tolterodine  · certain medicines for Parkinson's disease like benztropine, trihexyphenidyl  · certain medicines for stomach problems like dicyclomine, hyoscyamine  · certain medicines for travel sickness like scopolamine  · dexamethasone  · ipratropium  · NSAIDs, medicines for pain and inflammation, like ibuprofen or naproxen  · other medicines for Alzheimer's disease  · other medicines that prolong the QT interval (cause an abnormal heart rhythm)  · phenobarbital  · phenytoin  · rifampin, rifabutin or rifapentine  What if I miss a dose?  If you miss a dose, take it as soon as you can. If it is almost time for your next dose, take only that dose, do not take double or extra doses.  Where should I keep my medicine?  Keep out of reach of children.  Store at room temperature between 15 and 30 degrees C (59 and 86 degrees F). Throw away any unused medicine after the expiration date.  What should I tell my health care provider before I take this medicine?  They need to know if you have any of these conditions:  · asthma or other lung disease  · difficulty passing urine  · head injury  · heart disease  · history of irregular heartbeat  · liver disease  · seizures (convulsions)  · stomach or intestinal disease, ulcers or stomach bleeding  · an unusual or allergic reaction to donepezil, other medicines, foods, dyes, or preservatives  · pregnant or trying to get pregnant  · breast-feeding  What should I watch for while using this medicine?  Visit your doctor or health care professional for regular checks on your progress. Check with your doctor or health care professional if your symptoms do not get better or if they get  worse.  You may get drowsy or dizzy. Do not drive, use machinery, or do anything that needs mental alertness until you know how this drug affects you.  Date Last Reviewed:   NOTE:This sheet is a summary. It may not cover all possible information. If you have questions about this medicine, talk to your doctor, pharmacist, or health care provider. Copyright© 2016 [employer]        Duloxetine delayed-release capsules  What is this medicine?  DULOXETINE (doo LOX e teen) is used to treat depression, anxiety, and different types of chronic pain.  How should I use this medicine?  Take this medicine by mouth with a glass of water. Follow the directions on the prescription label. Do not cut, crush or chew this medicine. You can take this medicine with or without food. Take your medicine at regular intervals. Do not take your medicine more often than directed. Do not stop taking this medicine suddenly except upon the advice of your doctor. Stopping this medicine too quickly may cause serious side effects or your condition may worsen.  A special MedGuide will be given to you by the pharmacist with each prescription and refill. Be sure to read this information carefully each time.  Talk to your pediatrician regarding the use of this medicine in children. While this drug may be prescribed for children as young as 7 years of age for selected conditions, precautions do apply.  What side effects may I notice from receiving this medicine?  Side effects that you should report to your doctor or health care professional as soon as possible:  · allergic reactions like skin rash, itching or hives, swelling of the face, lips, or tongue  · changes in blood pressure  · confusion  · dark urine  · dizziness  · fast talking and excited feelings or actions that are out of control  · fast, irregular heartbeat  · fever  · general ill feeling or flu-like symptoms  · hallucination, loss of contact with reality  · light-colored stools  · loss of  balance or coordination  · redness, blistering, peeling or loosening of the skin, including inside the mouth  · right upper belly pain  · seizures  · suicidal thoughts or other mood changes  · trouble concentrating  · trouble passing urine or change in the amount of urine  · unusual bleeding or bruising  · unusually weak or tired  · yellowing of the eyes or skin  Side effects that usually do not require medical attention (report to your doctor or health care professional if they continue or are bothersome):  · blurred vision  · change in appetite  · change in sex drive or performance  · headache  · increased sweating  · nausea  What may interact with this medicine?  Do not take this medicine with any of the following medications:  · certain diet drugs like dexfenfluramine, fenfluramine  · desvenlafaxine  · linezolid  · MAOIs like Azilect, Carbex, Eldepryl, Marplan, Nardil, and Parnate  · methylene blue (intravenous)  · milnacipran  · thioridazine  · venlafaxine  This medicine may also interact with the following medications:  · alcohol  · aspirin and aspirin-like medicines  · certain antibiotics like ciprofloxacin and enoxacin  · certain medicines for blood pressure, heart disease, irregular heart beat  · certain medicines for depression, anxiety, or psychotic disturbances  · certain medicines for migraine headache like almotriptan, eletriptan, frovatriptan, naratriptan, rizatriptan, sumatriptan, zolmitriptan  · certain medicines that treat or prevent blood clots like warfarin, enoxaparin, and dalteparin  · cimetidine  · fentanyl  · lithium  · NSAIDS, medicines for pain and inflammation, like ibuprofen or naproxen  · phentermine  · procarbazine  · sibutramine  · Queenie's wort  · theophylline  · tramadol  · tryptophan  What if I miss a dose?  If you miss a dose, take it as soon as you can. If it is almost time for your next dose, take only that dose. Do not take double or extra doses.  Where should I keep my  medicine?  Keep out of the reach of children.  Store at room temperature between 20 and 25 degrees C (68 to 77 degrees F). Throw away any unused medicine after the expiration date.  What should I tell my health care provider before I take this medicine?  They need to know if you have any of these conditions:  · bipolar disorder or a family history of bipolar disorder  · glaucoma  · kidney disease  · liver disease  · suicidal thoughts or a previous suicide attempt  · taken medicines called MAOIs like Carbex, Eldepryl, Marplan, Nardil, and Parnate within 14 days  · an unusual reaction to duloxetine, other medicines, foods, dyes, or preservatives  · pregnant or trying to get pregnant  · breast-feeding  What should I watch for while using this medicine?  Tell your doctor if your symptoms do not get better or if they get worse. Visit your doctor or health care professional for regular checks on your progress. Because it may take several weeks to see the full effects of this medicine, it is important to continue your treatment as prescribed by your doctor.  Patients and their families should watch out for new or worsening thoughts of suicide or depression. Also watch out for sudden changes in feelings such as feeling anxious, agitated, panicky, irritable, hostile, aggressive, impulsive, severely restless, overly excited and hyperactive, or not being able to sleep. If this happens, especially at the beginning of treatment or after a change in dose, call your health care professional.  You may get drowsy or dizzy. Do not drive, use machinery, or do anything that needs mental alertness until you know how this medicine affects you. Do not stand or sit up quickly, especially if you are an older patient. This reduces the risk of dizzy or fainting spells. Alcohol may interfere with the effect of this medicine. Avoid alcoholic drinks.  This medicine can cause an increase in blood pressure. This medicine can also cause a sudden  drop in your blood pressure, which may make you feel faint and increase the chance of a fall. These effects are most common when you first start the medicine or when the dose is increased, or during use of other medicines that can cause a sudden drop in blood pressure. Check with your doctor for instructions on monitoring your blood pressure while taking this medicine.  Your mouth may get dry. Chewing sugarless gum or sucking hard candy, and drinking plenty of water may help. Contact your doctor if the problem does not go away or is severe.  Date Last Reviewed:   NOTE:This sheet is a summary. It may not cover all possible information. If you have questions about this medicine, talk to your doctor, pharmacist, or health care provider. Copyright© 2016 [employer]

## 2017-07-26 NOTE — TELEPHONE ENCOUNTER
----- Message from Debbijohn Viera sent at 7/26/2017 10:23 AM CDT -----  Contact: self/[phone]  Patient called in stating that she have an appointment with Dr Robertson at 10:00 o'clock, but [facility] [employer] [facility]. Does not know what happened but she still there (I contacted Paramjit Robertson MA but not answer). Please call and advise.       Thank you!!!

## 2017-07-27 NOTE — PROGRESS NOTES
Subjective:       Patient ID: Theresa Hook is a 76 y.o. female.    Chief Complaint: Follow-up  Since her stroke, she is not doing well.  She is having trouble naming things and it is getting worse.  She would like to try something for this. See MRI and email.  She's not having any chest pain or palpitations.  HPI  Review of Systems   Constitutional: Negative for activity change, appetite change, chills, fatigue, fever and unexpected weight change.   HENT: Negative for hearing loss.    Eyes: Negative for visual disturbance.   Respiratory: Negative for cough, chest tightness, shortness of breath and wheezing.    Cardiovascular: Negative for chest pain, palpitations and leg swelling.   Gastrointestinal: Negative for abdominal pain, constipation, nausea and vomiting.   Genitourinary: Negative for dysuria, frequency and urgency.   Musculoskeletal: Negative for arthralgias, back pain, gait problem, joint swelling and myalgias.   Skin: Negative for rash.   Neurological: Negative for light-headedness and headaches.   Psychiatric/Behavioral: Negative for dysphoric mood and sleep disturbance. The patient is not nervous/anxious.        Objective:      Physical Exam   Constitutional: She appears well-nourished.   HENT:   Head: Atraumatic.   Eyes: Conjunctivae are normal. No scleral icterus.   Neck: Neck supple.   Cardiovascular: Normal rate and regular rhythm.    Pulmonary/Chest: Effort normal and breath sounds normal.   Abdominal: Soft. There is no tenderness.   Musculoskeletal: She exhibits no edema.   Lymphadenopathy:     She has no cervical adenopathy.   Neurological: She is alert.   Skin: Skin is warm and dry.   Psychiatric: She has a normal mood and affect. Her behavior is normal.       Assessment:       1. Amnestic MCI (mild cognitive impairment with memory loss)    2. 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 S/P coronary artery stent placement,     4. Paroxysmal atrial  fibrillation    5. Imbalance due to old stroke        Plan:   Theresa was seen today for follow-up.    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  -     tramadol (ULTRAM) 50 mg tablet; Take 1 tablet (50 mg total) by mouth every 6 (six) hours as needed for Pain.    Return in about 3 months (around 10/26/2017).

## 2017-09-05 ENCOUNTER — TELEPHONE (OUTPATIENT)
Dept: INTERNAL MEDICINE | Facility: CLINIC | Age: 77
End: 2017-09-05

## 2017-09-05 NOTE — TELEPHONE ENCOUNTER
----- Message from Jasmin Carter sent at 9/5/2017 11:25 AM CDT -----  Contact: Pt [phone]  Patient would like to get a referral.  Does the patient already have the specialty clinic appointment scheduled:  [facility]  If yes, what date is the appointment scheduled:     Referral to what specialty:  Orthapedics  Reason (be specific):    Does the patient want the referral with a specific physician:  no  [facility]  Comments:  Pt states her feet are getting worse with the swelling. Pt would like a call today.Pt would like an appt as soon as possible.

## 2017-09-13 ENCOUNTER — OFFICE VISIT (OUTPATIENT)
Dept: INTERNAL MEDICINE | Facility: CLINIC | Age: 77
End: 2017-09-13
Payer: MEDICARE

## 2017-09-13 ENCOUNTER — HOSPITAL ENCOUNTER (OUTPATIENT)
Dept: RADIOLOGY | Facility: HOSPITAL | Age: 77
Discharge: HOME | End: 2017-09-13
Attending: INTERNAL MEDICINE
Payer: MEDICARE

## 2017-09-13 VITALS
HEART RATE: 66 BPM | SYSTOLIC BLOOD PRESSURE: 127 MMHG | WEIGHT: 156.5 LBS | BODY MASS INDEX: 25.15 KG/M2 | HEIGHT: 66 IN | DIASTOLIC BLOOD PRESSURE: 61 MMHG

## 2017-09-13 DIAGNOSIS — M79.672 PAIN IN BOTH FEET: Primary | ICD-10-CM

## 2017-09-13 DIAGNOSIS — M79.671 PAIN IN BOTH FEET: Primary | ICD-10-CM

## 2017-09-13 DIAGNOSIS — Z82.0 FAMILY HISTORY OF CHARCOT-MARIE-TOOTH DISEASE: ICD-10-CM

## 2017-09-13 DIAGNOSIS — Z86.73 H/O: STROKE: ICD-10-CM

## 2017-09-13 DIAGNOSIS — M25.579 ANKLE PAIN, UNSPECIFIED CHRONICITY, UNSPECIFIED LATERALITY: ICD-10-CM

## 2017-09-13 DIAGNOSIS — Z90.710 S/P LAPAROSCOPIC ASSISTED VAGINAL HYSTERECTOMY (LAVH): ICD-10-CM

## 2017-09-13 DIAGNOSIS — Z95.5 S/P CORONARY ARTERY STENT PLACEMENT: ICD-10-CM

## 2017-09-13 DIAGNOSIS — M79.671 PAIN IN BOTH FEET: ICD-10-CM

## 2017-09-13 DIAGNOSIS — Z86.73 OLD EMBOLIC STROKE WITHOUT LATE EFFECT: ICD-10-CM

## 2017-09-13 DIAGNOSIS — I48.0 PAROXYSMAL ATRIAL FIBRILLATION: ICD-10-CM

## 2017-09-13 DIAGNOSIS — M79.672 PAIN IN BOTH FEET: ICD-10-CM

## 2017-09-13 DIAGNOSIS — R26.81 GAIT INSTABILITY: ICD-10-CM

## 2017-09-13 PROBLEM — R39.15 URINARY URGENCY: Status: RESOLVED | Noted: 2017-01-29 | Resolved: 2017-09-13

## 2017-09-13 PROCEDURE — 73630 X-RAY EXAM OF FOOT: CPT | Mod: 26,50,, | Performed by: RADIOLOGY

## 2017-09-13 PROCEDURE — 73610 X-RAY EXAM OF ANKLE: CPT | Mod: 50,TC

## 2017-09-13 PROCEDURE — 73610 X-RAY EXAM OF ANKLE: CPT | Mod: 26,50,, | Performed by: RADIOLOGY

## 2017-09-13 PROCEDURE — 99999 PR PBB SHADOW E&M-EST. PATIENT-LVL IV: CPT | Mod: PBBFAC,,, | Performed by: INTERNAL MEDICINE

## 2017-09-13 PROCEDURE — 73630 X-RAY EXAM OF FOOT: CPT | Mod: 50,TC

## 2017-09-13 PROCEDURE — 3074F SYST BP LT 130 MM HG: CPT | Mod: ,,, | Performed by: INTERNAL MEDICINE

## 2017-09-13 PROCEDURE — 3078F DIAST BP <80 MM HG: CPT | Mod: ,,, | Performed by: INTERNAL MEDICINE

## 2017-09-13 PROCEDURE — 1159F MED LIST DOCD IN RCRD: CPT | Mod: ,,, | Performed by: INTERNAL MEDICINE

## 2017-09-13 PROCEDURE — 1126F AMNT PAIN NOTED NONE PRSNT: CPT | Mod: ,,, | Performed by: INTERNAL MEDICINE

## 2017-09-13 PROCEDURE — 99214 OFFICE O/P EST MOD 30 MIN: CPT | Mod: S$PBB,,, | Performed by: INTERNAL MEDICINE

## 2017-09-13 PROCEDURE — 99214 OFFICE O/P EST MOD 30 MIN: CPT | Mod: PBBFAC,25 | Performed by: INTERNAL MEDICINE

## 2017-09-13 RX ORDER — PREGABALIN 25 MG/1
25 CAPSULE ORAL 2 TIMES DAILY
COMMUNITY
End: 2017-12-13

## 2017-09-13 RX ORDER — PROPAFENONE HYDROCHLORIDE 150 MG/1
150 TABLET, COATED ORAL EVERY 8 HOURS
COMMUNITY
End: 2023-07-10

## 2017-09-13 RX ORDER — DONEPEZIL HYDROCHLORIDE 10 MG/1
10 TABLET, FILM COATED ORAL NIGHTLY
Qty: 30 TABLET | Refills: 11 | Status: SHIPPED | OUTPATIENT
Start: 2017-09-13 | End: 2017-12-13 | Stop reason: ALTCHOICE

## 2017-09-15 NOTE — PROGRESS NOTES
Subjective:       Patient ID: Theresa Hook is a 76 y.o. female.    Chief Complaint: Follow-up; Foot Pain; and Ankle Pain   she presents to the office with her girlfriend.  Her right foot has worsened such that it is not possible for her to drive.  In addition to the horribly painful sensations that she has in her ankles and feet she also has now developed warmth in her right foot.  Her nephew reminded her that her sister had Charcot feet.  Hemoglobin A1C   Date Value Ref Range Status   04/25/2017 5.4 4.5 - 6.2 % Final     Comment:     According to ADA guidelines, hemoglobin A1C <7.0% represents  optimal control in non-pregnant diabetic patients.  Different  metrics may apply to specific populations.   Standards of Medical Care in Diabetes - 2016.  For the purpose of screening for the presence of diabetes:  <5.7%     Consistent with the absence of diabetes  5.7-6.4%  Consistent with increasing risk for diabetes   (prediabetes)  >or=6.5%  Consistent with diabetes  Currently no consensus exists for use of hemoglobin A1C  for diagnosis of diabetes for children.     08/26/2009 6.0 4.0 - 6.2 % Final     Her sister also had diabetes.  It is possible that Mrs. Hook had subclinical type 2 diabetes or possibly prediabetes for many years and perhaps this problem with her feet is somehow related to diabetic peripheral neuropathy?    She also had an embolic stroke related to atrial fib June 2016.  The possibility of a central regional pain syndrome also comes to mind.      HPI  Review of Systems   Constitutional: Positive for activity change. Negative for appetite change, chills, fatigue, fever and unexpected weight change.   HENT: Negative for hearing loss.    Eyes: Negative for visual disturbance.   Respiratory: Negative for cough, chest tightness, shortness of breath and wheezing.    Cardiovascular: Negative for chest pain, palpitations and leg swelling.   Gastrointestinal: Negative for abdominal pain, constipation,  nausea and vomiting.   Genitourinary: Negative for dysuria, frequency and urgency.   Musculoskeletal: Positive for arthralgias, gait problem and joint swelling. Negative for back pain and myalgias.   Skin: Negative for rash.   Neurological: Positive for weakness and numbness. Negative for light-headedness and headaches.   Psychiatric/Behavioral: Positive for sleep disturbance. Negative for dysphoric mood. The patient is not nervous/anxious.        Objective:      Physical Exam   Constitutional: She is oriented to person, place, and time. She appears well-developed and well-nourished. No distress.   HENT:   Head: Normocephalic and atraumatic.   Right Ear: External ear normal.   Left Ear: External ear normal.   Nose: Nose normal.   Mouth/Throat: Oropharynx is clear and moist. No oropharyngeal exudate.   Eyes: Conjunctivae and EOM are normal. Pupils are equal, round, and reactive to light. Right eye exhibits no discharge. No scleral icterus.   Neck: Normal range of motion and full passive range of motion without pain. Neck supple. No spinous process tenderness and no muscular tenderness present. Carotid bruit is not present. No thyromegaly present.   Cardiovascular: Normal rate, regular rhythm, S1 normal, S2 normal, normal heart sounds and intact distal pulses.  Exam reveals no gallop and no friction rub.    No murmur heard.  Pulmonary/Chest: Effort normal and breath sounds normal. No respiratory distress. She has no wheezes. She has no rales. She exhibits no tenderness.   Abdominal: Soft. Bowel sounds are normal. She exhibits no distension and no mass. There is no tenderness. There is no rebound and no guarding.   Genitourinary: Pelvic exam was performed with patient supine. Uterus is not deviated, not enlarged, not fixed and not tender. Cervix exhibits no motion tenderness, no discharge and no friability. Right adnexum displays no mass, no tenderness and no fullness. Left adnexum displays no mass, no tenderness and  no fullness.   Musculoskeletal: Normal range of motion. She exhibits no edema or tenderness.   Her right foot is definitely warm compared to her left foot.   Lymphadenopathy:        Head (right side): No submental and no submandibular adenopathy present.        Head (left side): No submental and no submandibular adenopathy present.     She has no cervical adenopathy.        Right cervical: No superficial cervical, no deep cervical and no posterior cervical adenopathy present.       Left cervical: No superficial cervical, no deep cervical and no posterior cervical adenopathy present.        Right axillary: No pectoral and no lateral adenopathy present.        Left axillary: No pectoral and no lateral adenopathy present.       Right: No supraclavicular adenopathy present.        Left: No supraclavicular adenopathy present.   Neurological: She is alert and oriented to person, place, and time. She has normal reflexes. No cranial nerve deficit. She exhibits normal muscle tone. Coordination normal.   Skin: Skin is warm and dry. No rash noted.   Psychiatric: She has a normal mood and affect. Her behavior is normal. Her mood appears not anxious. Her speech is not rapid and/or pressured. She is not agitated. She does not exhibit a depressed mood.   Normal behavior, thought content, insight and judgement.       Assessment:       1. Pain in both feet    2. H/O: stroke    3. Gait instability    4. Family history of Charcot foot disease    5. S/P coronary artery stent placement,     6. S/P laparoscopic assisted vaginal hysterectomy (LAVH)    7. 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 Ankle pain, unspecified chronicity, unspecified laterality    9. Paroxysmal atrial fibrillation        Plan:   Theresa was seen today for follow-up, foot pain and ankle pain.    Diagnoses and all orders for this visit:    Pain in both feet.  This is very weird.  She has already seen a neurologist she has tried  remedies with no benefit.  She has a wedding on September 30 I recommend she purchase [employer] for this occasion.  I hope that she doesn't have a central regional pain syndrome as a consequence of her stroke in June 2016.  I will x-ray both of her feet and both of her ankles.  I hope that she doesn't have Charcot feet.  Regardless of what is going on, the shape of her feet or ankles has definitely changed!!!  She is developing flat feet and her ankles are turning in.  -     Ambulatory Referral to Orthopedics  -     X-Ray Ankle Complete 3 View Bilateral; Future  -     X-Ray Foot Complete Bilateral; Future    H/O: stroke  -     Ambulatory Referral to Orthopedics  -     X-Ray Ankle Complete 3 View Bilateral; Future  -     X-Ray Foot Complete Bilateral; Future    Gait instability  -     Ambulatory Referral to Orthopedics    Family history of Charcot foot disease  -     X-Ray Ankle Complete 3 View Bilateral; Future  -     X-Ray Foot Complete Bilateral; Future    S/P coronary artery stent placement, .  No chest pain.  No palpitations.    S/P laparoscopic assisted vaginal hysterectomy (LAVH).  Better bladder control.  Happy with surgical results.    Old embolic stroke without late effect, assoc a fib     Ankle pain, unspecified chronicity, unspecified laterality  -     Ambulatory Referral to Orthopedics  -     X-Ray Ankle Complete 3 View Bilateral; Future  -     X-Ray Foot Complete Bilateral; Future    Paroxysmal atrial fibrillation.  She is in a normal sinus rhythm today.    Other orders.  Since her stroke, she has been concerned about her memory and thinks that the 5 mg Aricept tablet helped her and she would like to know if she can increase this.  -     donepezil (ARICEPT) 10 MG tablet; Take 1 tablet (10 mg total) by mouth every evening.    Return in about 3 months (around 12/13/2017).

## 2017-09-18 ENCOUNTER — OFFICE VISIT (OUTPATIENT)
Dept: ORTHOPEDICS | Facility: CLINIC | Age: 77
End: 2017-09-18
Payer: MEDICARE

## 2017-09-18 VITALS — HEIGHT: 66 IN | WEIGHT: 155.56 LBS | BODY MASS INDEX: 25 KG/M2

## 2017-09-18 DIAGNOSIS — M76.821 POSTERIOR TIBIAL TENDINITIS OF RIGHT LOWER EXTREMITY: ICD-10-CM

## 2017-09-18 DIAGNOSIS — M25.571 CHRONIC PAIN OF BOTH ANKLES: Primary | ICD-10-CM

## 2017-09-18 DIAGNOSIS — M25.572 CHRONIC PAIN OF BOTH ANKLES: Primary | ICD-10-CM

## 2017-09-18 DIAGNOSIS — G89.29 CHRONIC PAIN OF BOTH ANKLES: Primary | ICD-10-CM

## 2017-09-18 DIAGNOSIS — I87.2 VENOUS INSUFFICIENCY OF BOTH LOWER EXTREMITIES: ICD-10-CM

## 2017-09-18 DIAGNOSIS — M79.2 NEUROGENIC PAIN OF LOWER EXTREMITY, UNSPECIFIED LATERALITY: ICD-10-CM

## 2017-09-18 PROCEDURE — 1159F MED LIST DOCD IN RCRD: CPT | Mod: ,,, | Performed by: ORTHOPAEDIC SURGERY

## 2017-09-18 PROCEDURE — 99213 OFFICE O/P EST LOW 20 MIN: CPT | Mod: PBBFAC | Performed by: ORTHOPAEDIC SURGERY

## 2017-09-18 PROCEDURE — 99203 OFFICE O/P NEW LOW 30 MIN: CPT | Mod: S$PBB,,, | Performed by: ORTHOPAEDIC SURGERY

## 2017-09-18 PROCEDURE — 99999 PR PBB SHADOW E&M-EST. PATIENT-LVL III: CPT | Mod: PBBFAC,,, | Performed by: ORTHOPAEDIC SURGERY

## 2017-09-18 PROCEDURE — 1125F AMNT PAIN NOTED PAIN PRSNT: CPT | Mod: ,,, | Performed by: ORTHOPAEDIC SURGERY

## 2017-09-18 RX ORDER — ACETAMINOPHEN AND CODEINE PHOSPHATE 300; 30 MG/1; MG/1
1 TABLET ORAL EVERY 12 HOURS PRN
Qty: 60 TABLET | Refills: 0 | Status: SHIPPED | OUTPATIENT
Start: 2017-09-18 | End: 2017-09-28

## 2017-09-18 RX ORDER — MEMANTINE HYDROCHLORIDE 10 MG/1
5 TABLET ORAL 2 TIMES DAILY
COMMUNITY
End: 2018-06-20 | Stop reason: ALTCHOICE

## 2017-09-18 NOTE — PROGRESS NOTES
DATE: 9/18/2017  PATIENT: Theresa Hook    CHIEF COMPLAINT: bilateral foot pain R>L    HISTORY:  Theresa Hook is a 76 y.o. female  here for initial evaluation of bilateral foot pain R>L. The pain has been present for 6 monts. There is no history of trauma although pt has fallen a couple times due to balance issues.  She has history of CVA without major deficits.. The patient describes the pain as burning and constant located medially.  The pain occurs at rest as well as with ambulation especially on the medial side of the right ankle. She describes the rest pain as shocking and burning in both LEs. She reports swelling that get worse during the course of the day. She is currently taking lyrica and ultram without much relief. She is concerned that she may have Charcot feet as this runs in her family.      PAST MEDICAL/SURGICAL HISTORY:  Past Medical History:   Diagnosis Date    Amblyopia     right eye    Breast cancer 8/14/2012    Diarrhea 10/30/2012    Dry eye syndrome     HTN (hypertension) 8/14/2012    Interval gout 8/14/2012    Keratoconus of right eye      Past Surgical History:   Procedure Laterality Date    APPENDECTOMY      BARTHOLIN GLAND CYST EXCISION      BREAST SURGERY      lumpectomy    CATARACT EXTRACTION  3/30/09    right eye    CATARACT EXTRACTION  4/13/09    left eye    spine cyst surgery         Current Medications:   Current Outpatient Prescriptions:     amiodarone (PACERONE) 200 MG Tab, , Disp: , Rfl:     ammonium lactate 12 % Crea, , Disp: , Rfl:     aspirin (ASPIRIN LOW DOSE) 81 MG EC tablet, 2 Tablet, Delayed Release (E.C.) Oral At bedtime, Disp: , Rfl:     aspirin (ECOTRIN) 81 MG EC tablet, Take 162 mg by mouth., Disp: , Rfl:     cetirizine (ZYRTEC) 10 MG tablet, Take 10 mg by mouth once daily., Disp: , Rfl:     clobetasol (TEMOVATE) 0.05 % external solution, Apply topically. .  AAA scalp qd - bid, Disp: , Rfl:     clopidogrel (PLAVIX) 75 mg tablet, Take 75  mg by mouth., Disp: , Rfl:     COENZYME Q10-L-CARNITINE-VIT E (HEALTHY HEART FORMULA ORAL), Take by mouth once daily., Disp: , Rfl:     cranberry 500 mg Cap, Take by mouth., Disp: , Rfl:     donepezil (ARICEPT) 10 MG tablet, Take 1 tablet (10 mg total) by mouth every evening., Disp: 30 tablet, Rfl: 11    furosemide (LASIX) 20 MG tablet, Take 20 mg by mouth 2 (two) times daily., Disp: , Rfl:     memantine (NAMENDA) 10 MG Tab, Take 5 mg by mouth 2 (two) times daily., Disp: , Rfl:     mometasone (ELOCON) 0.1 % cream, Apply 1 application topically Use as needed.   face, Disp: , Rfl:     nitrofurantoin, macrocrystal-monohydrate, (MACROBID) 100 MG capsule, Take 1 capsule (100 mg total) by mouth 2 (two) times daily., Disp: 14 capsule, Rfl: 1    pravastatin (PRAVACHOL) 20 MG tablet, Take by mouth., Disp: , Rfl:     pregabalin (LYRICA) 25 MG capsule, Take 25 mg by mouth 2 (two) times daily., Disp: , Rfl:     propafenone (RHTHYMOL) 150 MG Tab, Take 150 mg by mouth every 8 (eight) hours., Disp: , Rfl:     triamcinolone acetonide 0.1% (KENALOG) 0.1 % cream, Apply topically Twice daily.   disp:  60g tube AAA  , Disp: , Rfl:     acetaminophen-codeine 300-30mg (TYLENOL #3) 300-30 mg Tab, Take 1 tablet by mouth every 12 (twelve) hours as needed., Disp: 60 tablet, Rfl: 0    Social History:   Social History     Social History    Marital status:      Spouse name: N/A    Number of children: N/A    Years of education: N/A     Occupational History    Not on file.     Social History Main Topics    Smoking status: Never Smoker    Smokeless tobacco: Never Used    Alcohol use Yes      Comment: social    Drug use: No    Sexual activity: Not Currently     Birth control/ protection: Post-menopausal     Other Topics Concern    Not on file     Social History Narrative    No narrative on file       REVIEW OF SYSTEMS:  Constitution: Negative. Negative for chills, fever and night sweats.   Cardiovascular: Negative for  "chest pain and syncope.   Respiratory: Negative for cough and shortness of breath.   Gastrointestinal: See HPI. Negative for nausea/vomiting. Negative for abdominal pain.  Genitourinary: See HPI. Negative for discoloration or dysuria.  Skin: Negative for dry skin, itching and rash.   Hematologic/Lymphatic: Negative for bleeding problem. Does not bruise/bleed easily.   Musculoskeletal: Negative for falls and muscle weakness.   Neurological: See HPI. No seizures.   Endocrine: Negative for polydipsia, polyphagia and polyuria.   Allergic/Immunologic: Negative for hives and persistent infections.    PHYSICAL EXAMINATION:    Ht 5' 6" (1.676 m)   Wt 70.5 kg (155 lb 8.6 oz)   BMI 25.10 kg/m²     General: The patient is a  76 y.o. female in no apparent distress, the patient is orientatied to person, place and time.   Psych: Normal mood and affect  HEENT:  NCAT  Lungs:  Respirations are equal and unlabored.  CV:  2+ bilateral upper and lower extremity pulses.  Skin:  Intact throughout.    MSK:    RLE:  - mildly tender over medial hindfoot and ankle over PTT, pes plano valgus with abducted forefoot  - AROM and PROM intact.  - TA/EHL/Gastroc/FHL assessed in isolation without deficit  - SILT throughout  - DP and PT palpated  2+  - Capillary Refill <3s    Both lower extremities demonstrate edema of the feet and ankles.       IMAGING:     Radiographs of the BIlateral feet and ankles were  personally reviewed with the patient today.  They show no fractures, minimal degenerative changes. No evidence of Charcot changes    ASSESSMENT/PLAN:    Theresa was seen today for pain and pain.    Diagnoses and all orders for this visit:    Chronic pain of both ankles  -     MRI Lower Extremity Joint WO Cont Right; Future    Posterior tibial tendinitis of right lower extremity  -     MRI Lower Extremity Joint WO Cont Right; Future  -     acetaminophen-codeine 300-30mg (TYLENOL #3) 300-30 mg Tab; Take 1 tablet by mouth every 12 (twelve) hours " as needed.    Venous insufficiency of both lower extremities    Neurogenic pain of lower extremity, unspecified laterality  -     acetaminophen-codeine 300-30mg (TYLENOL #3) 300-30 mg Tab; Take 1 tablet by mouth every 12 (twelve) hours as needed.      No Follow-up on file.    Will obtain MRI as pain has been going on for quite some time and not improving.  Concern for PTT tear.  WBAT in meantime.      I have personally taken the history and examined this patient and agree with the residents note as stated above.  I do not suspect that Mrs. Hook has any significant structural changes that would account for all of her symptoms especially her rest pain which seem to be neurogenic in nature.  She does have some weakness and dysfunction of the posterior tibial tendon on the right and I am going to obtain an MRI to evaulate the right ankle.  I suspect she may need a pain clinic referral and evaluation. I gave her a prescription for tylenol 3 at her request.  She may also benefit from a PM&R evaluation

## 2017-09-18 NOTE — LETTER
September 18, 2017      Manuela Robertson MD  [address]  [facility] [address]           [facility] - Orthopedics  [address]  [facility] [address]  Phone: [phone]          Patient: Theresa Hook   MR Number: [id number]   YOB: 1940   Date of Visit: 9/18/2017       Dear Dr. Manuela Robertson:    Thank you for referring Theresa Hook to me for evaluation. Attached you will find relevant portions of my assessment and plan of care.    If you have questions, please do not hesitate to call me. I look forward to following Theresa Hook along with you.    Sincerely,    Janes Carter MD    Enclosure  CC:  No Recipients    If you would like to receive this communication electronically, please contact [email or web address] or [phone] to request more information on [email or web address] Link access.    For providers and/or their staff who would like to refer a patient to [facility], please contact us through our one-stop-shop provider referral line, [facility], at [phone].    If you feel you have received this communication in error or would no longer like to receive these types of communications, please e-mail [email or web address]

## 2017-09-19 ENCOUNTER — TELEPHONE (OUTPATIENT)
Dept: UROGYNECOLOGY | Facility: CLINIC | Age: 77
End: 2017-09-19

## 2017-09-19 ENCOUNTER — LAB VISIT (OUTPATIENT)
Dept: LAB | Facility: HOSPITAL | Age: 77
End: 2017-09-19
Attending: OBSTETRICS & GYNECOLOGY
Payer: MEDICARE

## 2017-09-19 DIAGNOSIS — N39.0 RECURRENT UTI: ICD-10-CM

## 2017-09-19 DIAGNOSIS — N39.0 RECURRENT UTI: Primary | ICD-10-CM

## 2017-09-19 LAB
BACTERIA #/AREA URNS HPF: ABNORMAL /HPF
BILIRUB UR QL STRIP: NEGATIVE
CLARITY UR: CLEAR
COLOR UR: ABNORMAL
GLUCOSE UR QL STRIP: ABNORMAL
HGB UR QL STRIP: ABNORMAL
KETONES UR QL STRIP: NEGATIVE
LEUKOCYTE ESTERASE UR QL STRIP: ABNORMAL
MICROSCOPIC COMMENT: ABNORMAL
NITRITE UR QL STRIP: POSITIVE
PH UR STRIP: ABNORMAL [PH] (ref 5–8)
PROT UR QL STRIP: ABNORMAL
RBC #/AREA URNS HPF: 0 /HPF (ref 0–4)
SP GR UR STRIP: 1.01 (ref 1–1.03)
URN SPEC COLLECT METH UR: ABNORMAL
UROBILINOGEN UR STRIP-ACNC: ABNORMAL EU/DL
WBC #/AREA URNS HPF: 20 /HPF (ref 0–5)

## 2017-09-19 PROCEDURE — 87186 SC STD MICRODIL/AGAR DIL: CPT

## 2017-09-19 PROCEDURE — 87086 URINE CULTURE/COLONY COUNT: CPT

## 2017-09-19 PROCEDURE — 81000 URINALYSIS NONAUTO W/SCOPE: CPT

## 2017-09-19 PROCEDURE — 87077 CULTURE AEROBIC IDENTIFY: CPT

## 2017-09-19 PROCEDURE — 87088 URINE BACTERIA CULTURE: CPT

## 2017-09-19 RX ORDER — PRAVASTATIN SODIUM 20 MG/1
TABLET ORAL
COMMUNITY
End: 2017-12-13

## 2017-09-19 RX ORDER — AMIODARONE HYDROCHLORIDE 100 MG/1
TABLET ORAL
COMMUNITY
End: 2018-04-10

## 2017-09-19 NOTE — TELEPHONE ENCOUNTER
----- Message from Sherley Zhou sent at 9/19/2017  8:07 AM CDT -----  x_  1st Request  _  2nd Request  _  3rd Request        Who: josh    Why: pt. Would like to speak with nurse regarding appt.& also experiencing UTI Please call to discuss    What Number to Call Back:[phone]    When to Expect a call back: (With in 24 hours)

## 2017-09-19 NOTE — TELEPHONE ENCOUNTER
Pt states she has a UTI. Advised pt to drop of a specimen at the nearest [facility] lab and we'd give her a call when the results came in. Pt voiced understanding and call ended.

## 2017-09-20 ENCOUNTER — TELEPHONE (OUTPATIENT)
Dept: UROGYNECOLOGY | Facility: CLINIC | Age: 77
End: 2017-09-20

## 2017-09-20 DIAGNOSIS — N39.0 URINARY TRACT INFECTION WITHOUT HEMATURIA, SITE UNSPECIFIED: Primary | ICD-10-CM

## 2017-09-20 RX ORDER — NITROFURANTOIN (MACROCRYSTALS) 100 MG/1
100 CAPSULE ORAL 2 TIMES DAILY
Qty: 14 CAPSULE | Refills: 0 | Status: SHIPPED | OUTPATIENT
Start: 2017-09-20 | End: 2017-09-27

## 2017-09-20 NOTE — TELEPHONE ENCOUNTER
Spoke with patient.  Comfortable with AZO.  Rx for macrobid sent to [facility].  Told her will call her in a few days when C&S back to confirm if this is correct med.  Given pyelo precautions. Can continue AZO OTC for symptoms, as well.     2nd UTI this year.  Advised to manage BMs.  Had some loose stool recently on medication change (tried lyrica for neuropathy).  Not candidate for vaginal estrogen with h/o BRCA.  Has soon follow up appt with NP.

## 2017-09-21 ENCOUNTER — TELEPHONE (OUTPATIENT)
Dept: UROGYNECOLOGY | Facility: CLINIC | Age: 77
End: 2017-09-21

## 2017-09-21 NOTE — TELEPHONE ENCOUNTER
----- Message from Reyna Wheeler MD sent at 9/21/2017  1:25 PM CDT -----  Please let patient know that urine C&S did not identify a certain organism and sensitivity that would be causing a UTI.  She should finish antibiotics, but I'm not sure she actually had a UTI.  If her symptoms persist or return after finishing her antibiotics, she should let us know.  In the future, if she drops off her urine sample, can she not take pyridium 1st?  It makes it harder to interpret the urine studies.  Thanks!

## 2017-09-21 NOTE — TELEPHONE ENCOUNTER
Called pt to inform her that her urine culture did not sure any organism and she should finish the antibiotic's that she is current prescribed to see if symptoms minimize. If not drop off another urine sample but do not take pyridium do to it making it difficult to read results. Pt states her symptom had already improved and she will notify the office with any changes. Pt voiced understanding and call was ended.

## 2017-09-22 ENCOUNTER — TELEPHONE (OUTPATIENT)
Dept: UROGYNECOLOGY | Facility: CLINIC | Age: 77
End: 2017-09-22

## 2017-09-22 LAB — BACTERIA UR CULT: NORMAL

## 2017-09-22 NOTE — TELEPHONE ENCOUNTER
Let patient know that final C&S was changed from mult orgs to definite organism with sensitivities.  Macrobid should work.  She is feeling better. Will see us for follow up in near future.

## 2017-09-26 ENCOUNTER — OFFICE VISIT (OUTPATIENT)
Dept: UROGYNECOLOGY | Facility: CLINIC | Age: 77
End: 2017-09-26
Payer: MEDICARE

## 2017-09-26 ENCOUNTER — HOSPITAL ENCOUNTER (OUTPATIENT)
Dept: RADIOLOGY | Facility: HOSPITAL | Age: 77
Discharge: HOME | End: 2017-09-26
Attending: ORTHOPAEDIC SURGERY
Payer: MEDICARE

## 2017-09-26 VITALS
BODY MASS INDEX: 25.08 KG/M2 | SYSTOLIC BLOOD PRESSURE: 124 MMHG | DIASTOLIC BLOOD PRESSURE: 70 MMHG | WEIGHT: 156.06 LBS | HEIGHT: 66 IN

## 2017-09-26 DIAGNOSIS — M25.571 CHRONIC PAIN OF BOTH ANKLES: ICD-10-CM

## 2017-09-26 DIAGNOSIS — G89.29 CHRONIC PAIN OF BOTH ANKLES: ICD-10-CM

## 2017-09-26 DIAGNOSIS — M25.572 CHRONIC PAIN OF BOTH ANKLES: ICD-10-CM

## 2017-09-26 DIAGNOSIS — N95.2 VAGINAL ATROPHY: ICD-10-CM

## 2017-09-26 DIAGNOSIS — M76.821 POSTERIOR TIBIAL TENDINITIS OF RIGHT LOWER EXTREMITY: ICD-10-CM

## 2017-09-26 DIAGNOSIS — K59.00 CONSTIPATION, UNSPECIFIED CONSTIPATION TYPE: ICD-10-CM

## 2017-09-26 DIAGNOSIS — Z01.419 WELL WOMAN EXAM: Primary | ICD-10-CM

## 2017-09-26 PROCEDURE — 99999 PR PBB SHADOW E&M-EST. PATIENT-LVL IV: CPT | Mod: PBBFAC,,, | Performed by: NURSE PRACTITIONER

## 2017-09-26 PROCEDURE — 99214 OFFICE O/P EST MOD 30 MIN: CPT | Mod: PBBFAC,25 | Performed by: NURSE PRACTITIONER

## 2017-09-26 PROCEDURE — 73721 MRI JNT OF LWR EXTRE W/O DYE: CPT | Mod: TC,RT

## 2017-09-26 PROCEDURE — 73721 MRI JNT OF LWR EXTRE W/O DYE: CPT | Mod: 26,RT,GC, | Performed by: RADIOLOGY

## 2017-09-26 PROCEDURE — G0101 CA SCREEN;PELVIC/BREAST EXAM: HCPCS | Mod: S$PBB,,, | Performed by: NURSE PRACTITIONER

## 2017-09-26 PROCEDURE — G0101 CA SCREEN;PELVIC/BREAST EXAM: HCPCS | Mod: PBBFAC | Performed by: NURSE PRACTITIONER

## 2017-09-26 NOTE — PROGRESS NOTES
Urogyn follow up    [facility] - GYNECOLOGY  [address]  [place] [address]    Theresa Hook  [id number]  1940      Theresa Hook is a 76 y.o. here for a urogyn follow up.    10/22/15    Title of Operation:  1) Laparoscopic-assisted total vaginal hysterectomy  2) Laparoscopic bilateral salpingo-oophorectomy  3) Bilateral uterosacral vaginal vault suspension  4) Anterior repair  5) Placement of tension-free transobuturator midurethral sling, TOT/Monarc  6) Cystourethroscopy    Indications for Surgery:  1) UI: (+) SHAHLA (when really has sneezing/coughing) > (+) UUI (1st AM). (+) pads (liner):1/day, usually damp. Daytime frequency: Q 4-5 hours. Nocturia: No: (--) dysuria, (--) hematuria, (--) frequent UTIs. (--) complete bladder emptying. DV occasionally--small 2nd void. +slow stream.   2) POP: Present. below introitus. Symptoms:(--) +can feel it, but doesn't really bother. (--) vaginal bleeding. (--) vaginal discharge. (--) sexually active. (--) Vaginal dryness. (--) vaginal estrogen use. History of breast cancer- +E2 - radiation treatment only. She is not interested in pessary placement.   --POP-Q: Aa +3; Ba +3; C -5; Ap -1; Bp -1; D -7. Genital hiatus 3, perineal body 1, total vaginal length 10-11.  3) BM: (--) constipation/straining. (--) chronic diarrhea. (--) hematochezia. (--) fecal incontinence. (+) fecal smearing/urgency. (--) incomplete evacuation. Eats high fiber diet.    Changes from last visit:  1) UI: (+) SHAHLA (+) UUI (+) pads:2/day, usually minimum wetness Daytime frequency: Q 3 -4hours. Nocturia: No: . (--) dysuria, (--) hematuria, (--) frequent UTIs. (--) complete bladder emptying. Double voids. Did not go to pelvic floor PT.  --3. URETHRAL FUNCTION/STORAGE PHASE:  a. WITH prolapse reduction:  · CLPP (150 mL): Negative at 81 cm H20  · VLPP (150 mL): Negative at 71 cm H20  · CLPP (300 mL): Negative at 122 cm H20  · VLPP (300 mL): Negative at 91 cm H20  · CLPP (MAX ): Negative  at 105 cm H20  · VLPP (MAX): Negative at 85 cm H20  · With pves catheters removed: POS CLPP and VLPP at max cap  These findings are consistent with Positive urodynamic stress incontinence only at max cap with pves catheter removed.  Assessment:  UF normal. PF with elevated PVR, but other studies (clinic PVR and void after cysto) confirmed normal emptying. Compliance normal. Max capacity ~650 mL. DO (--). SHAHLA (+) only at max cap once pves catheter removed.     2) POP: Present. below introitus. Symptoms:(--) (--) vaginal bleeding. (--) vaginal discharge. (--) sexually active. (+) Vaginal dryness. (--) vaginal estrogen use.   --POP-Q: Aa +3; Ba +3; C -5; Ap -1; Bp -1; D -7. Genital hiatus 3, perineal body 1, total vaginal length 10-11.    3) BM: (--) constipation/straining. (--) chronic diarrhea. (--) hematochezia. (--) fecal incontinence. (+) fecal smearing/urgency. (--) incomplete evacuation. Does not use fiber supplementation.    4) h/o left BRCA:  Pelvic US 9/15:  The uterus is normal in size, measuring 7.5 x 3.1 x 4.2 cm. The endometrial stripe is uniform in thickness measuring 2 mm. Multiple scattered dystrophic calcifications are noted throughout the uterine parenchyma, similar to the prior. Numerous   nabothian cysts are again noted. The ovaries are not visualized possibly secondary to their small size or surgical removal.  --has has had some abd distension since POP started; has gone up clothing size; no N/V    Preoperative Diagnosis:  1.  Rectocele      2.  Vaginal atrophy     3.  Uterovaginal prolapse, incomplete     4.  Cystocele     5.  Mixed incontinence urge and stress (male)(female)       Postoperative Diagnosis:  1.  Rectocele      2.  Vaginal atrophy     3.  Uterovaginal prolapse, incomplete     4.  Cystocele     5.  Mixed incontinence urge and stress (male)(female)         Issues include:  Patient Active Problem List   Diagnosis    HTN (hypertension)    Interval gout    Breast cancer, 2005, left  breast lumpectomy x 2, lymph nodes negative, XRT, tamoxifem 5 years.    Skin cancer, 2005 squamous cell right nasolabial fold.    [place] (hard of hearing)    Vaginal atrophy    Cystocele    Mixed stress and urge urinary incontinence    S/P laparoscopic assisted vaginal hysterectomy (LAVH)    Nocturia    Personal history of breast cancer    Pelvic floor weakness    Imbalance due to old stroke    Need for Tdap vaccination    Need for vaccination with 13-polyvalent pneumococcal conjugate vaccine    Paroxysmal atrial fibrillation    Old embolic stroke without late effect, assoc a fib     S/P coronary artery stent placement,     Amnestic MCI (mild cognitive impairment with memory loss)    H/O: stroke    Gait instability    Family history of Charcot foot disease     History since last visit: Denies bleeding or discharge.  No pain, VB.  No s/sx POP.   Bladder issues:Denies UI, urinary urgency, or frequency. Complains of abdominal bloating.  Denies dysuria.  Voiding every 3-4 hours during the day.    Bowel issues: No constipation. Managing with high fiber diet and gummies as needed.    She has had a CVA in 06/2016 and had cardiac stent placed 11/2016.  Complains of pain in feet.    Well Woman:  Pap:2009-- normal-- post hysterectomy  Mammo:12/2016  Stable post-surgical scar in the left breast is benign-negative.  No suspicious finding in either breast.  Mammogram in 1 year is recommended.  Colonoscopy:10/2012- normal-- repeat 2019  Dexa:01/25/2017 Normal bone mineral density    Current Outpatient Prescriptions   Medication Sig    acetaminophen-codeine 300-30mg (TYLENOL #3) 300-30 mg Tab Take 1 tablet by mouth every 12 (twelve) hours as needed.    amiodarone (PACERONE) 100 MG Tab Take by mouth.    amiodarone (PACERONE) 200 MG Tab     ammonium lactate 12 % Crea     aspirin (ASPIRIN LOW DOSE) 81 MG EC tablet 2 Tablet, Delayed Release (E.C.) Oral At bedtime    aspirin (ECOTRIN) 81 MG EC tablet  "Take 162 mg by mouth.    cetirizine (ZYRTEC) 10 MG tablet Take 10 mg by mouth once daily.    clobetasol (TEMOVATE) 0.05 % external solution Apply topically. .  AAA scalp qd - bid    clopidogrel (PLAVIX) 75 mg tablet Take 75 mg by mouth.    COENZYME Q10-L-CARNITINE-VIT E (HEALTHY HEART FORMULA ORAL) Take by mouth once daily.    cranberry 500 mg Cap Take by mouth.    donepezil (ARICEPT) 10 MG tablet Take 1 tablet (10 mg total) by mouth every evening.    furosemide (LASIX) 20 MG tablet Take 20 mg by mouth 2 (two) times daily.    memantine (NAMENDA) 10 MG Tab Take 5 mg by mouth 2 (two) times daily.    mometasone (ELOCON) 0.1 % cream Apply 1 application topically Use as needed.   face    nitrofurantoin, macrocrystal-monohydrate, (MACROBID) 100 MG capsule Take 1 capsule (100 mg total) by mouth 2 (two) times daily.    pravastatin (PRAVACHOL) 20 MG tablet Take by mouth.    pravastatin (PRAVACHOL) 20 MG tablet Take by mouth.    pregabalin (LYRICA) 25 MG capsule Take 25 mg by mouth 2 (two) times daily.    propafenone (RHTHYMOL) 150 MG Tab Take 150 mg by mouth every 8 (eight) hours.    triamcinolone acetonide 0.1% (KENALOG) 0.1 % cream Apply topically Twice daily.   disp:  60g tube AAA       No current facility-administered medications for this visit.      ROS:  As per HPI.      Exam  /70 (BP Location: Right arm, Patient Position: Sitting)   Ht 5' 6" (1.676 m)   Wt 70.8 kg (156 lb 1.4 oz)   BMI 25.19 kg/m²   General: alert and oriented, no acute distress  Respiratory: normal respiratory effort  Abd: soft, non-tender, non-distended.      Pelvic  Ext. Genitalia: normal external genitalia. Normal bartholin's and skenes glands.  Atrophy of labia noted.   Vagina: + atrophy. Normal vaginal mucosa without lesions. No discharge noted.   Non-tender bladder base without palpable mass.  No mesh palpable/visible.    Cervix: absent  Uterus:  absent   Urethra: no masses or tenderness  Urethral meatus: no lesions, " caruncle or prolapse.    POP-Q: Aa/Ba -1; C -9; Ba/Bp -2, TVL 11 cm.         Impression  1. Well woman exam     2. Vaginal atrophy     3. Constipation, unspecified constipation type       We reviewed the above issues and discussed options for short-term versus long-term management of her problems.   Plan:   1. Well women   --up to date    2.   Mixed urinary incontinence, urge > stress:  (not bothersome at this time)  --Empty bladder every 3 hours.  Empty well: wait a minute, lean forward on toilet.    --Avoid dietary irritants (see sheet).  Keep diary x 3-5 days to determine your irritants.  --KEGELS: do 10 in AM and 10 in PM, holding each x 10 seconds.  When you feel urge to go, STOP, KEGEL, and when urge has passed, then go to bathroom.  --URGE: consider anticholinergic.  Takes 2-4 weeks to see if will have effect.  For dry mouth: get sour, sugar free lozenge or gum.    --STRESS:  Pessary vs. Sling.     3.  Vaginal atrophy (dryness): .  Use REPLENS or REFRESH OTC: 1/2 applicator full in vagina twice a week.        4.  Continue fiber gummies/high fiber diet.  Always try to minimize straining with bowel movements.     5.  RTC 1 year for follow up    .    30 minutes were spent in face to face time with this patient  90 % of this time was spent in counseling and/or coordination of care    MIGUELINA Esquivel-BC [facility]  Division of Female Pelvic Medicine and Reconstructive Surgery  Department of Obstetrics & Gynecology

## 2017-09-26 NOTE — PATIENT INSTRUCTIONS
1. Well women   --up to date    2.   Mixed urinary incontinence, urge > stress:    --Empty bladder every 3 hours.  Empty well: wait a minute, lean forward on toilet.    --Avoid dietary irritants (see sheet).  Keep diary x 3-5 days to determine your irritants.  --KEGELS: do 10 in AM and 10 in PM, holding each x 10 seconds.  When you feel urge to go, STOP, KEGEL, and when urge has passed, then go to bathroom.  We recommend pelvic floor physical therapy-- patient refuses at this time.     --URGE: consider anticholinergic.  Takes 2-4 weeks to see if will have effect.  For dry mouth: get sour, sugar free lozenge or gum.    --STRESS:  Pessary vs. Sling.     3.  Vaginal atrophy (dryness): .  Use REPLENS or REFRESH OTC: 1/2 applicator full in vagina twice a week.        4.  Continue fiber gummies/high fiber diet.  Always try to minimize straining with bowel movements.     5.  RTC 1 year for follow up

## 2017-10-04 ENCOUNTER — TELEPHONE (OUTPATIENT)
Dept: UROGYNECOLOGY | Facility: CLINIC | Age: 77
End: 2017-10-04

## 2017-10-04 DIAGNOSIS — R35.0 URINARY FREQUENCY: Primary | ICD-10-CM

## 2017-10-04 DIAGNOSIS — N89.8 VAGINAL IRRITATION: ICD-10-CM

## 2017-10-04 NOTE — TELEPHONE ENCOUNTER
----- Message from Yvonnedelilah Nicole sent at 10/4/2017  1:42 PM CDT -----  Contact: NANCY BAGLEY [[id number]]  _x  1st Request  _  2nd Request  _  3rd Request        Who: NANCY BAGLEY [[id number]]    Why: patient states her UTI came back and she would like another  Rx Macrobid sent to [facility] - [place] - [address]    What Number to Call Back: [phone]    When to Expect a call back: (Before the end of the day)   -- if call after 3:00 call back will be tomorrow.

## 2017-10-04 NOTE — TELEPHONE ENCOUNTER
Call pt who stated she thinks her UTI has return, she is having symptoms of frequency and irritation and requesting an antibiotic. Explain to pt she needs to drop off a urine sample so that we can confirm that she is definitely experiencing a UTI. Pt was recently treated for a UTI on  9*19*17. Pt will drop urine off tomorrow at [facility] explain to pt as soon as we get results someone will contact her. Pt voiced understanding and call was ended.

## 2017-10-09 ENCOUNTER — TELEPHONE (OUTPATIENT)
Dept: UROGYNECOLOGY | Facility: CLINIC | Age: 77
End: 2017-10-09

## 2017-10-09 NOTE — TELEPHONE ENCOUNTER
----- Message from Aura Jean sent at 10/9/2017 11:21 AM CDT -----  Contact:  Aaron with [employer]  x_ 1st Request  _ 2nd Request  _ 3rd Request    Who: pt    Why: is needing orders faxed over for patient    What Number to Call Back: [phone]    When to Expect a call back: (Before the end of the day)  -- if call after 3:00 call back will be tomorrow.

## 2017-10-10 ENCOUNTER — TELEPHONE (OUTPATIENT)
Dept: UROGYNECOLOGY | Facility: CLINIC | Age: 77
End: 2017-10-10

## 2017-10-10 NOTE — TELEPHONE ENCOUNTER
Spoke to pt and informed her we have not received her results yet and we'd give her a call when we received them. Pt voiced understanding and call ended.

## 2017-10-10 NOTE — TELEPHONE ENCOUNTER
----- Message from Su Hopkins sent at 10/10/2017 11:13 AM CDT -----  Contact: Patient   X _1st Request  _  2nd Request  _  3rd Request    Who:NANCY BAGLEY [[id number]]    Why:Patient was calling to see if urine results were in she had it done on yesterday     What Number to Call Back:[phone]    When to Expect a call back: (Before the end of the day)   -- if call after 3:00 call back will be tomorrow.

## 2017-10-12 ENCOUNTER — TELEPHONE (OUTPATIENT)
Dept: UROGYNECOLOGY | Facility: CLINIC | Age: 77
End: 2017-10-12

## 2017-10-12 NOTE — TELEPHONE ENCOUNTER
----- Message from Marilyn Pike sent at 10/12/2017  2:22 PM CDT -----  _X  1st Request  _  2nd Request  _  3rd Request        Who: NANCY BAGLEY [[id number]]    Why: Requesting a call back in regards to the results of her UTI test. She states the results were faxed over. Please call to discuss.    What Number to Call Back:[phone]    When to Expect a call back: (Within 24 hours)    Please return the call at earliest convenience. Thanks!

## 2017-10-13 ENCOUNTER — TELEPHONE (OUTPATIENT)
Dept: UROGYNECOLOGY | Facility: CLINIC | Age: 77
End: 2017-10-13

## 2017-10-13 DIAGNOSIS — R35.0 URINARY FREQUENCY: Primary | ICD-10-CM

## 2017-10-13 RX ORDER — NITROFURANTOIN 25; 75 MG/1; MG/1
100 CAPSULE ORAL 2 TIMES DAILY
Qty: 14 CAPSULE | Refills: 1 | Status: SHIPPED | OUTPATIENT
Start: 2017-10-13 | End: 2017-12-13

## 2017-10-13 NOTE — TELEPHONE ENCOUNTER
----- Message from Amisha Valdes sent at 10/13/2017  4:30 PM CDT -----  Contact: pt  _  1st Request  _  2nd Request  _  3rd Request        Who: pt    Why: Requesting a call back in regards to returned the nurse's phone call regarding results. Please call pt     What Number to Call Back:[phone]    When to Expect a call back: (Within 24 hours)    Please return the call at earliest convenience. Thanks!

## 2017-10-13 NOTE — TELEPHONE ENCOUNTER
Informed pt as soon as we receive the results she'll be notified. Pt voiced understanding and call ended.

## 2017-10-13 NOTE — TELEPHONE ENCOUNTER
Informed pt I'll place an order for an urine culture so that she can  drop off an urine specimen at the [facility] [place] location between the hours of 8 am -12 pm on Saturday 10/14/17. Once results are in she'll be notified. Pt voiced understanding and call ended.

## 2017-10-13 NOTE — TELEPHONE ENCOUNTER
----- Message from Elin Jo sent at 10/13/2017  2:57 PM CDT -----  Contact: self  x_  1st Request  _  2nd Request  _  3rd Request    Who:pt    Why: pt calling in regards to uti medication.. Please advise    What Number to Call Back: [phone]    When to Expect a call back: (Before the end of the day)   -- if call after 3:00 call back will be tomorrow.

## 2017-10-14 ENCOUNTER — LAB VISIT (OUTPATIENT)
Dept: LAB | Facility: HOSPITAL | Age: 77
End: 2017-10-14
Attending: OBSTETRICS & GYNECOLOGY
Payer: MEDICARE

## 2017-10-14 DIAGNOSIS — R35.0 URINARY FREQUENCY: ICD-10-CM

## 2017-10-14 PROCEDURE — 87088 URINE BACTERIA CULTURE: CPT

## 2017-10-14 PROCEDURE — 87186 SC STD MICRODIL/AGAR DIL: CPT

## 2017-10-14 PROCEDURE — 87086 URINE CULTURE/COLONY COUNT: CPT

## 2017-10-14 PROCEDURE — 87077 CULTURE AEROBIC IDENTIFY: CPT

## 2017-10-17 ENCOUNTER — TELEPHONE (OUTPATIENT)
Dept: UROGYNECOLOGY | Facility: CLINIC | Age: 77
End: 2017-10-17

## 2017-10-17 DIAGNOSIS — N39.0 ACUTE UTI: Primary | ICD-10-CM

## 2017-10-17 DIAGNOSIS — N95.2 ATROPHIC VAGINITIS: ICD-10-CM

## 2017-10-17 LAB — BACTERIA UR CULT: NORMAL

## 2017-10-17 RX ORDER — SILVER SULFADIAZINE 10 G/1000G
CREAM TOPICAL
Qty: 50 G | Refills: 6 | Status: SHIPPED | OUTPATIENT
Start: 2017-10-18 | End: 2019-02-26 | Stop reason: SDUPTHER

## 2017-10-17 RX ORDER — CEPHALEXIN 500 MG/1
500 CAPSULE ORAL EVERY 12 HOURS
Qty: 10 CAPSULE | Refills: 0 | Status: SHIPPED | OUTPATIENT
Start: 2017-10-17 | End: 2017-10-22

## 2017-10-17 NOTE — TELEPHONE ENCOUNTER
Spoke with patient.  Let her know that she has another e coli UTI.  Rx for Keflex 500 BID x 7 days sent to pharmacy.      Has had some intermittent loose stool, every few days.  Thinks that memory medicine caused this. Stopped this 2 weeks ago.  Has had more normal bowel movements since then.  Advised to continue to manage BMs.  Taking 2-3 fiber gummies/day.  This helps to keep BMs less loose.  Need to avoid loose stool.      Is not ideal candidate for vaginal E2 due to h/o E2-sens BRCA per report.  Advised to start silvadene (has a sulfa allergy but usu tolerated topically--advised her to [facility]) in attempt to reduce vaginal bacteria/help with vaginal dryness/decrease extraneous bacterial growth.     Finish Macrobid that was sent in, as well.  Explained that Keflex may permeate tissue more and to start that now, as well.      She will keep us posted.

## 2017-10-19 ENCOUNTER — OFFICE VISIT (OUTPATIENT)
Dept: ORTHOPEDICS | Facility: CLINIC | Age: 77
End: 2017-10-19
Payer: MEDICARE

## 2017-10-19 VITALS — BODY MASS INDEX: 25.08 KG/M2 | HEIGHT: 66 IN | WEIGHT: 156.06 LBS

## 2017-10-19 DIAGNOSIS — M25.572 CHRONIC PAIN OF BOTH ANKLES: ICD-10-CM

## 2017-10-19 DIAGNOSIS — M79.2 NEUROGENIC PAIN OF LOWER EXTREMITY, UNSPECIFIED LATERALITY: ICD-10-CM

## 2017-10-19 DIAGNOSIS — M25.571 CHRONIC PAIN OF BOTH ANKLES: ICD-10-CM

## 2017-10-19 DIAGNOSIS — G89.29 CHRONIC PAIN OF BOTH ANKLES: ICD-10-CM

## 2017-10-19 DIAGNOSIS — I87.2 VENOUS INSUFFICIENCY OF BOTH LOWER EXTREMITIES: ICD-10-CM

## 2017-10-19 DIAGNOSIS — M76.821 POSTERIOR TIBIAL TENDINITIS OF RIGHT LOWER EXTREMITY: Primary | ICD-10-CM

## 2017-10-19 PROCEDURE — 99213 OFFICE O/P EST LOW 20 MIN: CPT | Mod: S$PBB,,, | Performed by: ORTHOPAEDIC SURGERY

## 2017-10-19 PROCEDURE — 99999 PR PBB SHADOW E&M-EST. PATIENT-LVL II: CPT | Mod: PBBFAC,,, | Performed by: ORTHOPAEDIC SURGERY

## 2017-10-19 PROCEDURE — 99212 OFFICE O/P EST SF 10 MIN: CPT | Mod: PBBFAC | Performed by: ORTHOPAEDIC SURGERY

## 2017-10-19 RX ORDER — ACETAMINOPHEN AND CODEINE PHOSPHATE 300; 30 MG/1; MG/1
1 TABLET ORAL EVERY 12 HOURS PRN
COMMUNITY
End: 2017-12-13

## 2017-10-19 RX ORDER — MULTIVIT WITH MINERALS/HERBS
1 TABLET ORAL DAILY
COMMUNITY
End: 2018-04-10

## 2017-10-20 NOTE — PROGRESS NOTES
Ms. Hook returns today with results of her MRI.  This is a 76-year-old female   who has bilateral foot and leg pain that presented to me about a month ago.  She   has been diagnosed with venous insufficiency and also was reporting some   neurogenic type pain.  She did have some more focal pain, especially over the   right posterior tibial tendon.  I ordered an MRI to evaluate the tendon.  The   MRI reveals some mild tenosynovitis of the posterior tibial tendon with some   mild changes in the calcaneonavicular ligament as well.  I do not believe Ms. Hook would be a candidate for surgery at this point.  I would recommend some   physical therapy as well as orthotics.  With regards to her venous   insufficiency, I gave her a prescription for some compression socks.  I am going   to have her return to see me in three months if necessary.      MOLLY/GREGORIA  dd: 10/20/2017 07:35:11 (CDT)  td: 10/20/2017 12:30:48 (MATTEO)  Doc ID   #[id number]  Job ID #[id number]    CC:

## 2017-11-29 ENCOUNTER — TELEPHONE (OUTPATIENT)
Dept: ORTHOPEDICS | Facility: CLINIC | Age: 77
End: 2017-11-29

## 2017-11-29 NOTE — TELEPHONE ENCOUNTER
Left voice mail for pt to return my call.   Will need to reschedule pt's 1/19 appointment with Dr Carter.

## 2017-11-29 NOTE — TELEPHONE ENCOUNTER
Received a return call from pt.   Rescheduled her 1/19 appointment for 1/15 at 11:15 am.  Pt will come to appointment at 10:30 am

## 2017-12-13 ENCOUNTER — OFFICE VISIT (OUTPATIENT)
Dept: INTERNAL MEDICINE | Facility: CLINIC | Age: 77
End: 2017-12-13
Payer: MEDICARE

## 2017-12-13 VITALS
SYSTOLIC BLOOD PRESSURE: 126 MMHG | WEIGHT: 155.63 LBS | BODY MASS INDEX: 25.01 KG/M2 | HEIGHT: 66 IN | DIASTOLIC BLOOD PRESSURE: 62 MMHG | HEART RATE: 62 BPM

## 2017-12-13 DIAGNOSIS — Z86.73 OLD EMBOLIC STROKE WITHOUT LATE EFFECT: ICD-10-CM

## 2017-12-13 DIAGNOSIS — Z23 NEED FOR VACCINATION WITH 13-POLYVALENT PNEUMOCOCCAL CONJUGATE VACCINE: ICD-10-CM

## 2017-12-13 DIAGNOSIS — Z86.73 H/O: STROKE: ICD-10-CM

## 2017-12-13 DIAGNOSIS — G31.84 AMNESTIC MCI (MILD COGNITIVE IMPAIRMENT WITH MEMORY LOSS): Primary | ICD-10-CM

## 2017-12-13 PROCEDURE — G0009 ADMIN PNEUMOCOCCAL VACCINE: HCPCS | Mod: PBBFAC

## 2017-12-13 PROCEDURE — 99213 OFFICE O/P EST LOW 20 MIN: CPT | Mod: PBBFAC,25 | Performed by: INTERNAL MEDICINE

## 2017-12-13 PROCEDURE — 99999 PR PBB SHADOW E&M-EST. PATIENT-LVL III: CPT | Mod: PBBFAC,,, | Performed by: INTERNAL MEDICINE

## 2017-12-13 PROCEDURE — 99214 OFFICE O/P EST MOD 30 MIN: CPT | Mod: S$PBB,,, | Performed by: INTERNAL MEDICINE

## 2017-12-13 PROCEDURE — 90670 PCV13 VACCINE IM: CPT | Mod: PBBFAC

## 2017-12-13 RX ORDER — TRAMADOL HYDROCHLORIDE 50 MG/1
50 TABLET ORAL EVERY 6 HOURS PRN
COMMUNITY
End: 2017-12-13 | Stop reason: ALTCHOICE

## 2017-12-13 RX ORDER — DONEPEZIL HYDROCHLORIDE 5 MG/1
5 TABLET, FILM COATED ORAL NIGHTLY
Qty: 90 TABLET | Refills: 3 | Status: SHIPPED | OUTPATIENT
Start: 2017-12-13 | End: 2018-04-27 | Stop reason: SDUPTHER

## 2017-12-13 NOTE — PATIENT INSTRUCTIONS
Memantine Tablets  What is this medicine?  MEMANTINE (MEM an teen) is used to treat dementia caused by Alzheimer's disease.  How should I use this medicine?  Take this medicine by mouth with a glass of water. Follow the directions on the prescription label. You may take this medicine with or without food. Take your doses at regular intervals. Do not take your medicine more often than directed. Continue to take your medicine even if you feel better. Do not stop taking except on the advice of your doctor or health care professional.  Talk to your pediatrician regarding the use of this medicine in children. Special care may be needed.  What side effects may I notice from receiving this medicine?  Side effects that you should report to your doctor or health care professional as soon as possible:  · allergic reactions like skin rash, itching or hives, swelling of the face, lips, or tongue  · agitation or a feeling of restlessness  · depressed mood  · dizziness  · hallucinations  · redness, blistering, peeling or loosening of the skin, including inside the mouth  · seizures  · vomiting  Side effects that usually do not require medical attention (report to your doctor or health care professional if they continue or are bothersome):  · constipation  · diarrhea  · headache  · nausea  · trouble sleeping  What may interact with this medicine?  · acetazolamide  · amantadine  · cimetidine  · dextromethorphan  · dofetilide  · hydrochlorothiazide  · ketamine  · metformin  · methazolamide  · quinidine  · ranitidine  · sodium bicarbonate  · triamterene  What if I miss a dose?  If you miss a dose, take it as soon as you can. If it is almost time for your next dose, take only that dose. Do not take double or extra doses. If you do not take your medicine for several days, contact your health care provider. Your dose may need to be changed.  Where should I keep my medicine?  Keep out of the reach of children.  Store at room  temperature between 15 degrees and 30 degrees C (59 degrees and 86 degrees F). Throw away any unused medicine after the expiration date.  What should I tell my health care provider before I take this medicine?  They need to know if you have any of these conditions:  · difficulty passing urine  · kidney disease  · liver disease  · seizures  · an unusual or allergic reaction to memantine, other medicines, foods, dyes, or preservatives  · pregnant or trying to get pregnant  · breast-feeding  What should I watch for while using this medicine?  Visit your doctor or health care professional for regular checks on your progress. Check with your doctor or health care professional if there is no improvement in your symptoms or if they get worse.  You may get drowsy or dizzy. Do not drive, use machinery, or do anything that needs mental alertness until you know how this drug affects you. Do not stand or sit up quickly, especially if you are an older patient. This reduces the risk of dizzy or fainting spells. Alcohol can make you more drowsy and dizzy. Avoid alcoholic drinks.  NOTE:This sheet is a summary. It may not cover all possible information. If you have questions about this medicine, talk to your doctor, pharmacist, or health care provider. Copyright© 2017 [employer]        Memantine Tablets  What is this medicine?  MEMANTINE (MEM an teen) is used to treat dementia caused by Alzheimer's disease.  How should I use this medicine?  Take this medicine by mouth with a glass of water. Follow the directions on the prescription label. You may take this medicine with or without food. Take your doses at regular intervals. Do not take your medicine more often than directed. Continue to take your medicine even if you feel better. Do not stop taking except on the advice of your doctor or health care professional.  Talk to your pediatrician regarding the use of this medicine in children. Special care may be needed.  What side  effects may I notice from receiving this medicine?  Side effects that you should report to your doctor or health care professional as soon as possible:  · allergic reactions like skin rash, itching or hives, swelling of the face, lips, or tongue  · agitation or a feeling of restlessness  · depressed mood  · dizziness  · hallucinations  · redness, blistering, peeling or loosening of the skin, including inside the mouth  · seizures  · vomiting  Side effects that usually do not require medical attention (report to your doctor or health care professional if they continue or are bothersome):  · constipation  · diarrhea  · headache  · nausea  · trouble sleeping  What may interact with this medicine?  · acetazolamide  · amantadine  · cimetidine  · dextromethorphan  · dofetilide  · hydrochlorothiazide  · ketamine  · metformin  · methazolamide  · quinidine  · ranitidine  · sodium bicarbonate  · triamterene  What if I miss a dose?  If you miss a dose, take it as soon as you can. If it is almost time for your next dose, take only that dose. Do not take double or extra doses. If you do not take your medicine for several days, contact your health care provider. Your dose may need to be changed.  Where should I keep my medicine?  Keep out of the reach of children.  Store at room temperature between 15 degrees and 30 degrees C (59 degrees and 86 degrees F). Throw away any unused medicine after the expiration date.  What should I tell my health care provider before I take this medicine?  They need to know if you have any of these conditions:  · difficulty passing urine  · kidney disease  · liver disease  · seizures  · an unusual or allergic reaction to memantine, other medicines, foods, dyes, or preservatives  · pregnant or trying to get pregnant  · breast-feeding  What should I watch for while using this medicine?  Visit your doctor or health care professional for regular checks on your progress. Check with your doctor or health  care professional if there is no improvement in your symptoms or if they get worse.  You may get drowsy or dizzy. Do not drive, use machinery, or do anything that needs mental alertness until you know how this drug affects you. Do not stand or sit up quickly, especially if you are an older patient. This reduces the risk of dizzy or fainting spells. Alcohol can make you more drowsy and dizzy. Avoid alcoholic drinks.  NOTE:This sheet is a summary. It may not cover all possible information. If you have questions about this medicine, talk to your doctor, pharmacist, or health care provider. Copyright© 2017 [employer]        Memantine Tablets  What is this medicine?  MEMANTINE (MEM an teen) is used to treat dementia caused by Alzheimer's disease.  How should I use this medicine?  Take this medicine by mouth with a glass of water. Follow the directions on the prescription label. You may take this medicine with or without food. Take your doses at regular intervals. Do not take your medicine more often than directed. Continue to take your medicine even if you feel better. Do not stop taking except on the advice of your doctor or health care professional.  Talk to your pediatrician regarding the use of this medicine in children. Special care may be needed.  What side effects may I notice from receiving this medicine?  Side effects that you should report to your doctor or health care professional as soon as possible:  · allergic reactions like skin rash, itching or hives, swelling of the face, lips, or tongue  · agitation or a feeling of restlessness  · depressed mood  · dizziness  · hallucinations  · redness, blistering, peeling or loosening of the skin, including inside the mouth  · seizures  · vomiting  Side effects that usually do not require medical attention (report to your doctor or health care professional if they continue or are bothersome):  · constipation  · diarrhea  · headache  · nausea  · trouble  sleeping  What may interact with this medicine?  · acetazolamide  · amantadine  · cimetidine  · dextromethorphan  · dofetilide  · hydrochlorothiazide  · ketamine  · metformin  · methazolamide  · quinidine  · ranitidine  · sodium bicarbonate  · triamterene  What if I miss a dose?  If you miss a dose, take it as soon as you can. If it is almost time for your next dose, take only that dose. Do not take double or extra doses. If you do not take your medicine for several days, contact your health care provider. Your dose may need to be changed.  Where should I keep my medicine?  Keep out of the reach of children.  Store at room temperature between 15 degrees and 30 degrees C (59 degrees and 86 degrees F). Throw away any unused medicine after the expiration date.  What should I tell my health care provider before I take this medicine?  They need to know if you have any of these conditions:  · difficulty passing urine  · kidney disease  · liver disease  · seizures  · an unusual or allergic reaction to memantine, other medicines, foods, dyes, or preservatives  · pregnant or trying to get pregnant  · breast-feeding  What should I watch for while using this medicine?  Visit your doctor or health care professional for regular checks on your progress. Check with your doctor or health care professional if there is no improvement in your symptoms or if they get worse.  You may get drowsy or dizzy. Do not drive, use machinery, or do anything that needs mental alertness until you know how this drug affects you. Do not stand or sit up quickly, especially if you are an older patient. This reduces the risk of dizzy or fainting spells. Alcohol can make you more drowsy and dizzy. Avoid alcoholic drinks.  NOTE:This sheet is a summary. It may not cover all possible information. If you have questions about this medicine, talk to your doctor, pharmacist, or health care provider. Copyright© 2017 [employer]        Memantine Tablets  What  is this medicine?  MEMANTINE (MEM an teen) is used to treat dementia caused by Alzheimer's disease.  How should I use this medicine?  Take this medicine by mouth with a glass of water. Follow the directions on the prescription label. You may take this medicine with or without food. Take your doses at regular intervals. Do not take your medicine more often than directed. Continue to take your medicine even if you feel better. Do not stop taking except on the advice of your doctor or health care professional.  Talk to your pediatrician regarding the use of this medicine in children. Special care may be needed.  What side effects may I notice from receiving this medicine?  Side effects that you should report to your doctor or health care professional as soon as possible:  · allergic reactions like skin rash, itching or hives, swelling of the face, lips, or tongue  · agitation or a feeling of restlessness  · depressed mood  · dizziness  · hallucinations  · redness, blistering, peeling or loosening of the skin, including inside the mouth  · seizures  · vomiting  Side effects that usually do not require medical attention (report to your doctor or health care professional if they continue or are bothersome):  · constipation  · diarrhea  · headache  · nausea  · trouble sleeping  What may interact with this medicine?  · acetazolamide  · amantadine  · cimetidine  · dextromethorphan  · dofetilide  · hydrochlorothiazide  · ketamine  · metformin  · methazolamide  · quinidine  · ranitidine  · sodium bicarbonate  · triamterene  What if I miss a dose?  If you miss a dose, take it as soon as you can. If it is almost time for your next dose, take only that dose. Do not take double or extra doses. If you do not take your medicine for several days, contact your health care provider. Your dose may need to be changed.  Where should I keep my medicine?  Keep out of the reach of children.  Store at room temperature between 15 degrees and  30 degrees C (59 degrees and 86 degrees F). Throw away any unused medicine after the expiration date.  What should I tell my health care provider before I take this medicine?  They need to know if you have any of these conditions:  · difficulty passing urine  · kidney disease  · liver disease  · seizures  · an unusual or allergic reaction to memantine, other medicines, foods, dyes, or preservatives  · pregnant or trying to get pregnant  · breast-feeding  What should I watch for while using this medicine?  Visit your doctor or health care professional for regular checks on your progress. Check with your doctor or health care professional if there is no improvement in your symptoms or if they get worse.  You may get drowsy or dizzy. Do not drive, use machinery, or do anything that needs mental alertness until you know how this drug affects you. Do not stand or sit up quickly, especially if you are an older patient. This reduces the risk of dizzy or fainting spells. Alcohol can make you more drowsy and dizzy. Avoid alcoholic drinks.  NOTE:This sheet is a summary. It may not cover all possible information. If you have questions about this medicine, talk to your doctor, pharmacist, or health care provider. Copyright© 2017 [employer]        Donepezil tablets  What is this medicine?  DONEPEZIL (taylor NEP e zil) is used to treat mild to moderate dementia caused by Alzheimer's disease.  How should I use this medicine?  Take this medicine by mouth with a glass of water. Follow the directions on the prescription label. You may take this medicine with or without food. Take this medicine at regular intervals. This medicine is usually taken before bedtime. Do not take it more often than directed. Continue to take your medicine even if you feel better. Do not stop taking except on your doctor's advice.  If you are taking the 23 mg donepezil tablet, swallow it whole; do not cut, crush, or chew it.  Talk to your pediatrician  regarding the use of this medicine in children. Special care may be needed.  What side effects may I notice from receiving this medicine?  Side effects that you should report to your doctor or health care professional as soon as possible:  · allergic reactions like skin rash, itching or hives, swelling of the face, lips, or tongue  · changes in vision  · feeling faint or lightheaded, falls  · problems with balance  · redness, blistering, peeling or loosening of the skin, including inside the mouth  · slow heartbeat, or palpitations  · stomach pain  · unusual bleeding or bruising, red or purple spots on the skin  · vomiting  · weight loss  Side effects that usually do not require medical attention (report to your doctor or health care professional if they continue or are bothersome):  · diarrhea, especially when starting treatment  · headache  · indigestion or heartburn  · loss of appetite  · muscle cramps  · nausea  What may interact with this medicine?  Do not take this medicine with any of the following medications:  · certain medicines for fungal infections like itraconazole, fluconazole, posaconazole, and voriconazole  · cisapride  · dextromethorphan; quinidine  · dofetilide  · dronedarone  · pimozide  · quinidine  · thioridazine  · ziprasidone  This medicine may also interact with the following medications:  · antihistamines for allergy, cough and cold  · atropine  · bethanechol  · carbamazepine  · certain medicines for bladder problems like oxybutynin, tolterodine  · certain medicines for Parkinson's disease like benztropine, trihexyphenidyl  · certain medicines for stomach problems like dicyclomine, hyoscyamine  · certain medicines for travel sickness like scopolamine  · dexamethasone  · ipratropium  · NSAIDs, medicines for pain and inflammation, like ibuprofen or naproxen  · other medicines for Alzheimer's disease  · other medicines that prolong the QT interval (cause an abnormal heart  rhythm)  · phenobarbital  · phenytoin  · rifampin, rifabutin or rifapentine  What if I miss a dose?  If you miss a dose, take it as soon as you can. If it is almost time for your next dose, take only that dose, do not take double or extra doses.  Where should I keep my medicine?  Keep out of reach of children.  Store at room temperature between 15 and 30 degrees C (59 and 86 degrees F). Throw away any unused medicine after the expiration date.  What should I tell my health care provider before I take this medicine?  They need to know if you have any of these conditions:  · asthma or other lung disease  · difficulty passing urine  · head injury  · heart disease  · history of irregular heartbeat  · liver disease  · seizures (convulsions)  · stomach or intestinal disease, ulcers or stomach bleeding  · an unusual or allergic reaction to donepezil, other medicines, foods, dyes, or preservatives  · pregnant or trying to get pregnant  · breast-feeding  What should I watch for while using this medicine?  Visit your doctor or health care professional for regular checks on your progress. Check with your doctor or health care professional if your symptoms do not get better or if they get worse.  You may get drowsy or dizzy. Do not drive, use machinery, or do anything that needs mental alertness until you know how this drug affects you.  NOTE:This sheet is a summary. It may not cover all possible information. If you have questions about this medicine, talk to your doctor, pharmacist, or health care provider. Copyright© 2017 [employer]

## 2017-12-14 NOTE — PROGRESS NOTES
Subjective:       Patient ID: Theresa Hook is a 77 y.o. female.    Chief Complaint: Follow-up  Since last seen, she made significant progress.  She's doing well.  Dr. Carter was able to determine the problem with her right foot was with her tendons.  She has been in physical therapy twice a week, On Tuesdays and Thursdays,    she has changed shoes and she is making significant progress and is greatly improved.    When last seen, she was worried about cognitive decline since her stroke and she started taking Aricept 5 mg for her memory and she benefited from this medication without any side effects.    She then went to see her neurologist who addedNamenda and recommended that she increase herAricept to 10 mg daily.  She got diarrhea on this and would like advice on her medication.    She feels as though her balance has improved.    She has not experienced any suspicious symptoms suggestive of a recurrent stroke.  Overall, she is doing well and she would like to continue on Aricept 5 mg taken once daily.  On 10 mg once daily she had nausea and diarrhea.  HPI  Review of Systems   Constitutional: Negative for activity change, appetite change, chills, fatigue, fever and unexpected weight change.   HENT: Negative for hearing loss.    Eyes: Negative for visual disturbance.   Respiratory: Negative for cough, chest tightness, shortness of breath and wheezing.    Cardiovascular: Negative for chest pain, palpitations and leg swelling.   Gastrointestinal: Negative for abdominal pain, constipation, nausea and vomiting.   Genitourinary: Negative for dysuria, frequency and urgency.   Musculoskeletal: Negative for arthralgias, back pain, gait problem, joint swelling and myalgias.   Skin: Negative for rash.   Neurological: Negative for light-headedness and headaches.   Psychiatric/Behavioral: Negative for dysphoric mood and sleep disturbance. The patient is not nervous/anxious.        Objective:      Physical Exam    Constitutional: She is oriented to person, place, and time. She appears well-developed and well-nourished. No distress.   HENT:   Head: Normocephalic and atraumatic.   Right Ear: External ear normal.   Left Ear: External ear normal.   Nose: Nose normal.   Mouth/Throat: Oropharynx is clear and moist. No oropharyngeal exudate.   Eyes: Conjunctivae and EOM are normal. Pupils are equal, round, and reactive to light. Right eye exhibits no discharge. No scleral icterus.   Neck: Normal range of motion and full passive range of motion without pain. Neck supple. No spinous process tenderness and no muscular tenderness present. Carotid bruit is not present. No thyromegaly present.   Cardiovascular: Normal rate, regular rhythm, S1 normal, S2 normal, normal heart sounds and intact distal pulses.  Exam reveals no gallop and no friction rub.    No murmur heard.  Pulmonary/Chest: Effort normal and breath sounds normal. No respiratory distress. She has no wheezes. She has no rales. She exhibits no tenderness.   Abdominal: Soft. Bowel sounds are normal. She exhibits no distension and no mass. There is no tenderness. There is no rebound and no guarding.   Genitourinary: Pelvic exam was performed with patient supine. Uterus is not deviated, not enlarged, not fixed and not tender. Cervix exhibits no motion tenderness, no discharge and no friability. Right adnexum displays no mass, no tenderness and no fullness. Left adnexum displays no mass, no tenderness and no fullness.   Musculoskeletal: Normal range of motion. She exhibits no edema or tenderness.   Lymphadenopathy:        Head (right side): No submental and no submandibular adenopathy present.        Head (left side): No submental and no submandibular adenopathy present.     She has no cervical adenopathy.        Right cervical: No superficial cervical, no deep cervical and no posterior cervical adenopathy present.       Left cervical: No superficial cervical, no deep cervical  and no posterior cervical adenopathy present.        Right axillary: No pectoral and no lateral adenopathy present.        Left axillary: No pectoral and no lateral adenopathy present.       Right: No supraclavicular adenopathy present.        Left: No supraclavicular adenopathy present.   Neurological: She is alert and oriented to person, place, and time. She has normal reflexes. No cranial nerve deficit. She exhibits normal muscle tone. Coordination normal.   Skin: Skin is warm and dry. No rash noted.   Psychiatric: She has a normal mood and affect. Her behavior is normal. Her mood appears not anxious. Her speech is not rapid and/or pressured. She is not agitated. She does not exhibit a depressed mood.   Normal behavior, thought content, insight and judgement.   Nursing note and vitals reviewed.      Assessment:       1. Amnestic MCI (mild cognitive impairment with memory loss)    2. 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 H/O: stroke    4. Need for vaccination with 13-polyvalent pneumococcal conjugate vaccine        Plan:   Theresa was seen today for follow-up.    Diagnoses and all orders for this visit:    Amnestic MCI (mild cognitive impairment with memory loss)    Old embolic stroke without late effect, assoc a fib     H/O: stroke    Need for vaccination with 13-polyvalent pneumococcal conjugate vaccine    Other orders  -     donepezil (ARICEPT) 5 MG tablet; Take 1 tablet (5 mg total) by mouth every evening.  -     (In Office Administered) Pneumococcal Conjugate Vaccine (13 Valent) (IM)      Medication List with Changes/Refills   New Medications    DONEPEZIL (ARICEPT) 5 MG TABLET    Take 1 tablet (5 mg total) by mouth every evening.   Current Medications    AMIODARONE (PACERONE) 100 MG TAB    Take by mouth.    AMIODARONE (PACERONE) 200 MG TAB        AMMONIUM LACTATE 12 % CREA        ASPIRIN (ASPIRIN LOW DOSE) 81 MG EC TABLET    2 Tablet, Delayed Release (E.C.) Oral At bedtime     ASPIRIN (ECOTRIN) 81 MG EC TABLET    Take 162 mg by mouth.    B COMPLEX VITAMINS TABLET    Take 1 tablet by mouth once daily.    CETIRIZINE (ZYRTEC) 10 MG TABLET    Take 10 mg by mouth once daily.    CLOBETASOL (TEMOVATE) 0.05 % EXTERNAL SOLUTION    Apply topically. .  AAA scalp qd - bid    CLOPIDOGREL (PLAVIX) 75 MG TABLET    Take 75 mg by mouth.    COENZYME Q10-L-CARNITINE-VIT E (HEALTHY HEART FORMULA ORAL)    Take by mouth once daily.    FUROSEMIDE (LASIX) 20 MG TABLET    Take 20 mg by mouth 2 (two) times daily.    MEMANTINE (NAMENDA) 10 MG TAB    Take 5 mg by mouth 2 (two) times daily.    MOMETASONE (ELOCON) 0.1 % CREAM    Apply 1 application topically Use as needed.   face    PRAVASTATIN (PRAVACHOL) 20 MG TABLET    Take by mouth.    PROPAFENONE (RHTHYMOL) 150 MG TAB    Take 150 mg by mouth every 8 (eight) hours.    SILVER SULFADIAZINE 1% (SILVADENE) 1 % CREAM    Apply topically 3 (three) times a week. Use dime-sized amount with finger 3 times/week.    TRIAMCINOLONE ACETONIDE 0.1% (KENALOG) 0.1 % CREAM    Apply topically Twice daily.   disp:  60g tube AAA     Discontinued Medications    ACETAMINOPHEN-CODEINE 300-30MG (TYLENOL #3) 300-30 MG TAB    Take 1 tablet by mouth every 12 (twelve) hours as needed.     CRANBERRY 500 MG CAP    Take by mouth.    DONEPEZIL (ARICEPT) 10 MG TABLET    Take 1 tablet (10 mg total) by mouth every evening.    NITROFURANTOIN, MACROCRYSTAL-MONOHYDRATE, (MACROBID) 100 MG CAPSULE    Take 1 capsule (100 mg total) by mouth 2 (two) times daily.    PRAVASTATIN (PRAVACHOL) 20 MG TABLET    Take by mouth.    PREGABALIN (LYRICA) 25 MG CAPSULE    Take 25 mg by mouth 2 (two) times daily.    TRAMADOL (ULTRAM) 50 MG TABLET    Take 50 mg by mouth every 6 (six) hours as needed for Pain.     Return in about 6 months (around 6/13/2018).

## 2017-12-27 ENCOUNTER — TELEPHONE (OUTPATIENT)
Dept: INTERNAL MEDICINE | Facility: CLINIC | Age: 77
End: 2017-12-27

## 2017-12-27 NOTE — TELEPHONE ENCOUNTER
----- Message from Radha White sent at 12/27/2017  2:13 PM CST -----  Contact: patient [phone]  Pt is due for her annual mammogram and is scheduled to see  on 1/15/18 @ 11:15. She would like for her mammogram to be scheduled after that appt/ around 12:30 or 1pm . Pt has to drive 80 miles for appts so this would save her a trip. Please enter order and schedule. Please notify pt after this has been scheduled.

## 2017-12-29 ENCOUNTER — TELEPHONE (OUTPATIENT)
Dept: INTERNAL MEDICINE | Facility: CLINIC | Age: 77
End: 2017-12-29

## 2017-12-29 DIAGNOSIS — Z12.31 ENCOUNTER FOR SCREENING MAMMOGRAM FOR MALIGNANT NEOPLASM OF BREAST: ICD-10-CM

## 2017-12-29 DIAGNOSIS — C50.412 MALIGNANT NEOPLASM OF UPPER-OUTER QUADRANT OF LEFT FEMALE BREAST, UNSPECIFIED ESTROGEN RECEPTOR STATUS: Primary | Chronic | ICD-10-CM

## 2017-12-29 NOTE — TELEPHONE ENCOUNTER
Screening [facility], please schedule 01- around 12:30 pm. She has a 80 mile drive and another appointment this day. Thank you very much.

## 2018-01-15 ENCOUNTER — OFFICE VISIT (OUTPATIENT)
Dept: ORTHOPEDICS | Facility: CLINIC | Age: 78
End: 2018-01-15
Payer: MEDICARE

## 2018-01-15 ENCOUNTER — HOSPITAL ENCOUNTER (OUTPATIENT)
Dept: RADIOLOGY | Facility: HOSPITAL | Age: 78
Discharge: HOME | End: 2018-01-15
Attending: INTERNAL MEDICINE
Payer: MEDICARE

## 2018-01-15 VITALS — BODY MASS INDEX: 25.07 KG/M2 | WEIGHT: 156 LBS | HEIGHT: 66 IN

## 2018-01-15 DIAGNOSIS — G89.29 CHRONIC PAIN OF BOTH ANKLES: ICD-10-CM

## 2018-01-15 DIAGNOSIS — M76.821 POSTERIOR TIBIAL TENDINITIS OF RIGHT LOWER EXTREMITY: ICD-10-CM

## 2018-01-15 DIAGNOSIS — Z12.31 ENCOUNTER FOR SCREENING MAMMOGRAM FOR MALIGNANT NEOPLASM OF BREAST: ICD-10-CM

## 2018-01-15 DIAGNOSIS — M25.571 CHRONIC PAIN OF BOTH ANKLES: ICD-10-CM

## 2018-01-15 DIAGNOSIS — M79.2 NEUROGENIC PAIN OF LOWER EXTREMITY, UNSPECIFIED LATERALITY: Primary | ICD-10-CM

## 2018-01-15 DIAGNOSIS — C50.412 MALIGNANT NEOPLASM OF UPPER-OUTER QUADRANT OF LEFT FEMALE BREAST, UNSPECIFIED ESTROGEN RECEPTOR STATUS: Chronic | ICD-10-CM

## 2018-01-15 DIAGNOSIS — M25.572 CHRONIC PAIN OF BOTH ANKLES: ICD-10-CM

## 2018-01-15 DIAGNOSIS — I87.2 VENOUS INSUFFICIENCY OF BOTH LOWER EXTREMITIES: ICD-10-CM

## 2018-01-15 PROCEDURE — 99999 PR PBB SHADOW E&M-EST. PATIENT-LVL II: CPT | Mod: PBBFAC,,, | Performed by: ORTHOPAEDIC SURGERY

## 2018-01-15 PROCEDURE — 99213 OFFICE O/P EST LOW 20 MIN: CPT | Mod: S$PBB,,, | Performed by: ORTHOPAEDIC SURGERY

## 2018-01-15 PROCEDURE — 77067 SCR MAMMO BI INCL CAD: CPT | Mod: 26,,, | Performed by: RADIOLOGY

## 2018-01-15 PROCEDURE — 77063 BREAST TOMOSYNTHESIS BI: CPT | Mod: 26,,, | Performed by: RADIOLOGY

## 2018-01-15 PROCEDURE — 77067 SCR MAMMO BI INCL CAD: CPT | Mod: TC

## 2018-01-15 PROCEDURE — 99212 OFFICE O/P EST SF 10 MIN: CPT | Mod: PBBFAC | Performed by: ORTHOPAEDIC SURGERY

## 2018-01-15 RX ORDER — ACETAMINOPHEN AND CODEINE PHOSPHATE 300; 30 MG/1; MG/1
1 TABLET ORAL EVERY 12 HOURS PRN
Qty: 60 TABLET | Refills: 0 | Status: SHIPPED | OUTPATIENT
Start: 2018-01-15 | End: 2018-01-25

## 2018-01-18 NOTE — PROGRESS NOTES
Ms. Hook returns today for followup.  This is a 77-year-old female who has   venous insufficiency and some neurogenic pain of her lower extremities as well   as some mild posterior tibial tendinitis by exam as well as by MRI.  She was   last seen by me three months ago.  She reports overall she is doing better.  She   has had some physical therapy.  She has been wearing supportive shoes.    Examination today reveals less swelling of both lower extremities.  She has   minimal tenderness over the posterior tibial tendon insertion.  She has decent   strength of the posterior tibial tendon.  At this point, I would not recommend   any further intervention.  She should continue with her exercises and wearing   supportive shoes.  I am going to have her return to see me as needed.      MOLLY/GREGORIA  dd: 01/17/2018 10:55:52 (CST)  td: 01/18/2018 02:01:26 (CST)  Doc ID   #[id number]  Job ID #[id number]    CC:

## 2018-04-03 ENCOUNTER — TELEPHONE (OUTPATIENT)
Dept: UROGYNECOLOGY | Facility: CLINIC | Age: 78
End: 2018-04-03

## 2018-04-03 NOTE — TELEPHONE ENCOUNTER
Spoke to pt and scheduled her for NP Saqib next available and informed her I'd send an appointment letter in the mail. Pt voiced understanding and call ended.

## 2018-04-03 NOTE — TELEPHONE ENCOUNTER
----- Message from Mercedes Beach sent at 4/3/2018  8:56 AM CDT -----  Contact: pt  x_  1st Request  _  2nd Request  _  3rd Request      Who:pt    Why: pt would like to speak to staff in regards to her vaginal bleeding     What Number to Call Back: [phone]    When to Expect a call back: (Before the end of the day)   -- if call after 3:00 call back will be tomorrow.

## 2018-04-10 ENCOUNTER — OFFICE VISIT (OUTPATIENT)
Dept: UROGYNECOLOGY | Facility: CLINIC | Age: 78
End: 2018-04-10
Payer: MEDICARE

## 2018-04-10 VITALS
WEIGHT: 154.75 LBS | HEIGHT: 66 IN | BODY MASS INDEX: 24.87 KG/M2 | SYSTOLIC BLOOD PRESSURE: 120 MMHG | DIASTOLIC BLOOD PRESSURE: 60 MMHG

## 2018-04-10 DIAGNOSIS — N39.46 MIXED STRESS AND URGE URINARY INCONTINENCE: ICD-10-CM

## 2018-04-10 DIAGNOSIS — R35.1 NOCTURIA: Primary | ICD-10-CM

## 2018-04-10 DIAGNOSIS — Z87.440 HX: UTI (URINARY TRACT INFECTION): ICD-10-CM

## 2018-04-10 DIAGNOSIS — N95.2 VAGINAL ATROPHY: ICD-10-CM

## 2018-04-10 PROCEDURE — 87086 URINE CULTURE/COLONY COUNT: CPT

## 2018-04-10 PROCEDURE — 99999 PR PBB SHADOW E&M-EST. PATIENT-LVL IV: CPT | Mod: PBBFAC,,, | Performed by: NURSE PRACTITIONER

## 2018-04-10 PROCEDURE — 99213 OFFICE O/P EST LOW 20 MIN: CPT | Mod: S$PBB,,, | Performed by: NURSE PRACTITIONER

## 2018-04-10 PROCEDURE — 99214 OFFICE O/P EST MOD 30 MIN: CPT | Mod: PBBFAC | Performed by: NURSE PRACTITIONER

## 2018-04-10 RX ORDER — PRAVASTATIN SODIUM 20 MG/1
TABLET ORAL
COMMUNITY
End: 2018-04-10 | Stop reason: SDUPTHER

## 2018-04-10 RX ORDER — LISINOPRIL 20 MG/1
TABLET ORAL
COMMUNITY
End: 2018-07-24 | Stop reason: ALTCHOICE

## 2018-04-10 RX ORDER — AMIODARONE HYDROCHLORIDE 200 MG/1
TABLET ORAL
COMMUNITY
End: 2018-04-10 | Stop reason: SDUPTHER

## 2018-04-10 RX ORDER — CLOPIDOGREL BISULFATE 75 MG/1
TABLET ORAL
COMMUNITY
End: 2018-04-10 | Stop reason: SDUPTHER

## 2018-04-10 NOTE — PROGRESS NOTES
Urogyn follow up    [facility] - GYNECOLOGY  [address]  [place] [address]    Theresa Hook  [id number]  1940      Theresa Hook is a 77 y.o. here for a urogyn follow up.    10/22/15    Title of Operation:  1) Laparoscopic-assisted total vaginal hysterectomy  2) Laparoscopic bilateral salpingo-oophorectomy  3) Bilateral uterosacral vaginal vault suspension  4) Anterior repair  5) Placement of tension-free transobuturator midurethral sling, TOT/Monarc  6) Cystourethroscopy    Indications for Surgery:  1) UI: (+) SHAHLA (when really has sneezing/coughing) > (+) UUI (1st AM). (+) pads (liner):1/day, usually damp. Daytime frequency: Q 4-5 hours. Nocturia: No: (--) dysuria, (--) hematuria, (--) frequent UTIs. (--) complete bladder emptying. DV occasionally--small 2nd void. +slow stream.   2) POP: Present. below introitus. Symptoms:(--) +can feel it, but doesn't really bother. (--) vaginal bleeding. (--) vaginal discharge. (--) sexually active. (--) Vaginal dryness. (--) vaginal estrogen use. History of breast cancer- +E2 - radiation treatment only. She is not interested in pessary placement.   --POP-Q: Aa +3; Ba +3; C -5; Ap -1; Bp -1; D -7. Genital hiatus 3, perineal body 1, total vaginal length 10-11.  3) BM: (--) constipation/straining. (--) chronic diarrhea. (--) hematochezia. (--) fecal incontinence. (+) fecal smearing/urgency. (--) incomplete evacuation. Eats high fiber diet.    Changes from last visit:  1) UI: (+) SHAHLA (+) UUI (+) pads:2/day, usually minimum wetness Daytime frequency: Q 3 -4hours. Nocturia: No: . (--) dysuria, (--) hematuria, (--) frequent UTIs. (--) complete bladder emptying. Double voids. Did not go to pelvic floor PT.  --3. URETHRAL FUNCTION/STORAGE PHASE:  a. WITH prolapse reduction:  · CLPP (150 mL): Negative at 81 cm H20  · VLPP (150 mL): Negative at 71 cm H20  · CLPP (300 mL): Negative at 122 cm H20  · VLPP (300 mL): Negative at 91 cm H20  · CLPP (MAX ): Negative  at 105 cm H20  · VLPP (MAX): Negative at 85 cm H20  · With pves catheters removed: POS CLPP and VLPP at max cap  These findings are consistent with Positive urodynamic stress incontinence only at max cap with pves catheter removed.  Assessment:  UF normal. PF with elevated PVR, but other studies (clinic PVR and void after cysto) confirmed normal emptying. Compliance normal. Max capacity ~650 mL. DO (--). SHAHLA (+) only at max cap once pves catheter removed.     2) POP: Present. below introitus. Symptoms:(--) (--) vaginal bleeding. (--) vaginal discharge. (--) sexually active. (+) Vaginal dryness. (--) vaginal estrogen use.   --POP-Q: Aa +3; Ba +3; C -5; Ap -1; Bp -1; D -7. Genital hiatus 3, perineal body 1, total vaginal length 10-11.    3) BM: (--) constipation/straining. (--) chronic diarrhea. (--) hematochezia. (--) fecal incontinence. (+) fecal smearing/urgency. (--) incomplete evacuation. Does not use fiber supplementation.    4) h/o left BRCA:  Pelvic US 9/15:  The uterus is normal in size, measuring 7.5 x 3.1 x 4.2 cm. The endometrial stripe is uniform in thickness measuring 2 mm. Multiple scattered dystrophic calcifications are noted throughout the uterine parenchyma, similar to the prior. Numerous   nabothian cysts are again noted. The ovaries are not visualized possibly secondary to their small size or surgical removal.  --has has had some abd distension since POP started; has gone up clothing size; no N/V    Preoperative Diagnosis:  1.  Rectocele      2.  Vaginal atrophy     3.  Uterovaginal prolapse, incomplete     4.  Cystocele     5.  Mixed incontinence urge and stress (male)(female)       Postoperative Diagnosis:  1.  Rectocele      2.  Vaginal atrophy     3.  Uterovaginal prolapse, incomplete     4.  Cystocele     5.  Mixed incontinence urge and stress (male)(female)         Issues include:  Patient Active Problem List   Diagnosis    HTN (hypertension)    Interval gout    Breast cancer, 2005, left  breast lumpectomy x 2, lymph nodes negative, XRT, tamoxifem 5 years.    Skin cancer, 2005 squamous cell right nasolabial fold.    [place]    Vaginal atrophy    Cystocele    Mixed stress and urge urinary incontinence    S/P laparoscopic assisted vaginal hysterectomy ([facility])    Nocturia    Personal history of breast cancer    Pelvic floor weakness    Imbalance due to old stroke    Need for Tdap vaccination    Need for vaccination with 13-polyvalent pneumococcal conjugate vaccine    Paroxysmal atrial fibrillation    Old embolic stroke without late effect, assoc a fib     S/P coronary artery stent placement,     Amnestic MCI (mild cognitive impairment with memory loss)    H/O: stroke    Gait instability    Family history of Charcot foot disease    Encounter for screening mammogram for malignant neoplasm of breast     History since last visit: Report vaginal bleeding a 1-2 weeks ago when she wiped after voiding.   Bladder issues:Denies UI, urinary urgency, or frequency. Still complains of abdominal bloating. Voiding every 2-3 hours during the day. Nocturia 2-3 times/ night. Was treated for UTI.  Bowel issues: +  Constipation--worse when she uses narcotic medication. Managing with high fiber diet and gummies as needed.  Not taking stool softener.  Has had intermittent diarrhea.  She has had a CVA in 06/2016 and had cardiac stent placed 11/2016.      Well Woman:  Pap:2009-- normal-- post hysterectomy  Mammo:12/2016  Stable post-surgical scar in the left breast is benign-negative.  No suspicious finding in either breast.  Mammogram in 1 year is recommended.  Colonoscopy:10/2012- normal-- repeat 2019  Dexa:01/25/2017 Normal bone mineral density    Current Outpatient Prescriptions   Medication Sig    aspirin (ASPIRIN LOW DOSE) 81 MG EC tablet 2 Tablet, Delayed Release (E.C.) Oral At bedtime    cetirizine (ZYRTEC) 10 MG tablet Take 10 mg by mouth once daily.    clobetasol  "(TEMOVATE) 0.05 % external solution Apply topically. .  AAA scalp qd - bid    donepezil (ARICEPT) 5 MG tablet Take 1 tablet (5 mg total) by mouth every evening.    pravastatin (PRAVACHOL) 20 MG tablet Take by mouth.    propafenone (RHTHYMOL) 150 MG Tab Take 150 mg by mouth every 8 (eight) hours.    COENZYME Q10-L-CARNITINE-VIT E (HEALTHY HEART FORMULA ORAL) Take by mouth once daily.    lisinopril (PRINIVIL,ZESTRIL) 20 MG tablet lisinopril 20 mg tablet   Take 1 tablet every day by oral route.    memantine (NAMENDA) 10 MG Tab Take 5 mg by mouth 2 (two) times daily.    mometasone (ELOCON) 0.1 % cream Apply 1 application topically Use as needed.   face    silver sulfADIAZINE 1% (SILVADENE) 1 % cream Apply topically 3 (three) times a week. Use dime-sized amount with finger 3 times/week.    triamcinolone acetonide 0.1% (KENALOG) 0.1 % cream Apply topically Twice daily.   disp:  60g tube AAA       No current facility-administered medications for this visit.      ROS:  As per HPI.      Exam  /60 (BP Location: Right arm, Patient Position: Sitting, BP Method: Medium (Manual))   Ht 5' 6" (1.676 m)   Wt 70.2 kg (154 lb 12.2 oz)   BMI 24.98 kg/m²   General: alert and oriented, no acute distress  Respiratory: normal respiratory effort  Abd: soft, non-tender, non-distended.      Pelvic  Ext. Genitalia: normal external genitalia. Normal bartholin's and skenes glands.  Atrophy of labia noted.   Vagina: + atrophy. Normal vaginal mucosa without lesions. No discharge noted.   Non-tender bladder base without palpable mass.  No mesh palpable/visible.    Cervix: absent  Uterus:  absent   Urethra: no masses or tenderness  Urethral meatus: no lesions, caruncle or prolapse.    POP-Q: Aa/Ba -1; C -9; Ba/Bp -2, TVL 11 cm.         Impression  1. Nocturia  Urine culture   2. Mixed stress and urge urinary incontinence     3. Vaginal atrophy     4. Hx: UTI (urinary tract infection)       We reviewed the above issues and discussed " options for short-term versus long-term management of her problems.   Plan:   1. Recent uti  --urine culture today  --start silvadene -- dime size amount twice weekly    2.   Mixed urinary incontinence, urge > stress:  (not bothersome at this time)  --Empty bladder every 3 hours.  Empty well: wait a minute, lean forward on toilet.    --Avoid dietary irritants (see sheet).  Keep diary x 3-5 days to determine your irritants.  --KEGELS: do 10 in AM and 10 in PM, holding each x 10 seconds.  When you feel urge to go, STOP, KEGEL, and when urge has passed, then go to bathroom.  --URGE: consider anticholinergic.  Takes 2-4 weeks to see if will have effect.  For dry mouth: get sour, sugar free lozenge or gum.    --STRESS:  Pessary vs. Sling.     3.  Vaginal atrophy (dryness): .  Use REPLENS or REFRESH OTC: 1/2 applicator full in vagina twice a week.      4.  Irregular bowel habits/ abdominal bloating  --Continue fiber gummies/high fiber diet.  Always try to minimize straining with bowel movements  --referral to colorectal.   --call [phone] to schedule    5.  RTC 6 months  .    30 minutes were spent in face to face time with this patient  90 % of this time was spent in counseling and/or coordination of care    MIGUELINA Esquivel-BC [facility]  Division of Female Pelvic Medicine and Reconstructive Surgery  Department of Obstetrics & Gynecology

## 2018-04-10 NOTE — PATIENT INSTRUCTIONS
1. Recent uti  --urine culture today  --start silvadene -- dime size amount twice weekly    2.   Mixed urinary incontinence, urge > stress:  (not bothersome at this time)  --Empty bladder every 3 hours.  Empty well: wait a minute, lean forward on toilet.    --Avoid dietary irritants (see sheet).  Keep diary x 3-5 days to determine your irritants.  --KEGELS: do 10 in AM and 10 in PM, holding each x 10 seconds.  When you feel urge to go, STOP, KEGEL, and when urge has passed, then go to bathroom.  --URGE: consider anticholinergic.  Takes 2-4 weeks to see if will have effect.  For dry mouth: get sour, sugar free lozenge or gum.    --STRESS:  Pessary vs. Sling.     3.  Vaginal atrophy (dryness): .  Use REPLENS or REFRESH OTC: 1/2 applicator full in vagina twice a week.      4.  Irregular bowel habits/ abdominal bloating  --Continue fiber gummies/high fiber diet.  Always try to minimize straining with bowel movements  --referral to colorectal.   --call [phone] to schedule    5.  RTC 6 months    __________________________________________________________________________________    Bladder Irritants  Certain foods and drinks have been associated with worsening symptoms of urinary frequency, urgency, urge incontinence, or  bladder pain. If you suffer from any of these conditions, you may wish to try eliminating one or more of these foods from your  diet and see if your symptoms improve. If bladder symptoms are related to dietary factors, strict adherence to a diet that  eliminates the food should bring marked relief in 10 days. Once you are feeling better, you can begin to add foods back into  your diet, one at a time. If symptoms return, you will be able to identify the irritant. As you add foods back to your diet it is  very important that you drink significant amounts of water.  Low-acid fruit substitutions include apricots, papaya, pears and watermelon. Coffee drinkers can drink Kava or other lowacid  instant  drinks. Tea drinkers can substitute non-citrus herbal and sun brewed teas. Calcium carbonate co-buffered with  calcium ascorbate can be substituted for Vitamin C. Prelief is a dietary supplement that works as an acid blocker for the  bladder.  Where to get more information:   Overcoming Bladder Disorders by Ladonna Bahena and Anam Valencia, 1990   You Dont Have to Live with Cystitis! By Aminah Munoz, 1988  List of Common Bladder Irritants*  Alcoholic beverages  Apples and apple juice  Cantaloupe  Carbonated beverages  Chili and spicy foods  Chocolate  Citrus fruit  Coffee (including decaffeinated)  Cranberries and cranberry juice  Grapes  Guava  Milk Products: milk, cheese, cottage cheese, yogurt, ice cream  Peaches  Pineapple  Plums  Strawberries  Sugar especially artificial sweeteners, saccharin, aspartame, corn sweeteners, honey, fructose, sucrose, lactose  Tea  Tomatoes and tomato juice  Vitamin B complex  Vinegar  *Most people are not sensitive to ALL of these products; your goal is to find the foods that make YOUR  symptoms worse

## 2018-04-12 LAB — BACTERIA UR CULT: NORMAL

## 2018-04-23 ENCOUNTER — TELEPHONE (OUTPATIENT)
Dept: UROGYNECOLOGY | Facility: CLINIC | Age: 78
End: 2018-04-23

## 2018-04-23 ENCOUNTER — PATIENT MESSAGE (OUTPATIENT)
Dept: UROGYNECOLOGY | Facility: CLINIC | Age: 78
End: 2018-04-23

## 2018-04-23 DIAGNOSIS — R35.0 URINARY FREQUENCY: Primary | ICD-10-CM

## 2018-04-23 DIAGNOSIS — R30.9 PAINFUL URINATION: Primary | ICD-10-CM

## 2018-04-23 DIAGNOSIS — R35.0 URINARY FREQUENCY: ICD-10-CM

## 2018-04-23 RX ORDER — OXYBUTYNIN CHLORIDE 10 MG/1
10 TABLET, EXTENDED RELEASE ORAL DAILY
Qty: 30 TABLET | Refills: 12 | Status: SHIPPED | OUTPATIENT
Start: 2018-04-23 | End: 2018-05-22

## 2018-04-23 NOTE — TELEPHONE ENCOUNTER
Patient states azo does help urinary frequency and bladder spasms.  Will repeat urine culture today.  Rx for oxybutynin sent to pharmacy.  List of bladder irritants sent to patient. Denies fever or blood in urine.  MIGUELINA Esquivel-BC

## 2018-04-23 NOTE — TELEPHONE ENCOUNTER
Spoke with pt who explained that she is having the same symptoms she complained about a couple of weeks ago in the office, painful urination and urinary frequency. Pt is requesting that orders be placed in the computer for her to drop off a urine sample. Pt urine culture on 4/10/18 time of the visit was negative.    Explain to pt that I will put in order for her to drop of a urine sample: Pt going to [facility]. Pt  explain she had been taking AZO over the counter to give her some relief but requesting something to be called in prior to culture results.    Explain to pt I will notified KEVIN Cerrato of her concerns and symptoms, she should drop off sample as soon as possible and someone will be in contact with her today. Pt voiced understanding and call was ended.

## 2018-04-23 NOTE — TELEPHONE ENCOUNTER
----- Message from Mercedes Beach sent at 4/23/2018  9:02 AM CDT -----  Contact: NANCY BAGLEY [[id number]]            Name of Who is Calling: NANCY BAGLEY [[id number]]      What is the request in detail: pt requesting Urine specimen order to be sent to [facility] in [facility]       Can the clinic reply by [facility]: no      What Number to Call Back if not in [facility]:[phone]

## 2018-04-24 ENCOUNTER — LAB VISIT (OUTPATIENT)
Dept: LAB | Facility: HOSPITAL | Age: 78
End: 2018-04-24
Attending: NURSE PRACTITIONER
Payer: MEDICARE

## 2018-04-24 DIAGNOSIS — R35.0 URINARY FREQUENCY: ICD-10-CM

## 2018-04-24 DIAGNOSIS — R30.9 PAINFUL URINATION: ICD-10-CM

## 2018-04-24 LAB
BACTERIA #/AREA URNS HPF: ABNORMAL /HPF
BILIRUB UR QL STRIP: NEGATIVE
CLARITY UR: ABNORMAL
COLOR UR: ABNORMAL
GLUCOSE UR QL STRIP: NEGATIVE
HGB UR QL STRIP: ABNORMAL
KETONES UR QL STRIP: NEGATIVE
LEUKOCYTE ESTERASE UR QL STRIP: ABNORMAL
MICROSCOPIC COMMENT: ABNORMAL
NITRITE UR QL STRIP: POSITIVE
PH UR STRIP: 5 [PH] (ref 5–8)
PROT UR QL STRIP: ABNORMAL
RBC #/AREA URNS HPF: 3 /HPF (ref 0–4)
SP GR UR STRIP: 1.02 (ref 1–1.03)
SQUAMOUS #/AREA URNS HPF: 1 /HPF
URN SPEC COLLECT METH UR: ABNORMAL
UROBILINOGEN UR STRIP-ACNC: NEGATIVE EU/DL
WBC #/AREA URNS HPF: >100 /HPF (ref 0–5)

## 2018-04-24 PROCEDURE — 87077 CULTURE AEROBIC IDENTIFY: CPT

## 2018-04-24 PROCEDURE — 87186 SC STD MICRODIL/AGAR DIL: CPT

## 2018-04-24 PROCEDURE — 87086 URINE CULTURE/COLONY COUNT: CPT

## 2018-04-24 PROCEDURE — 87088 URINE BACTERIA CULTURE: CPT

## 2018-04-24 PROCEDURE — 81000 URINALYSIS NONAUTO W/SCOPE: CPT

## 2018-04-27 LAB — BACTERIA UR CULT: NORMAL

## 2018-04-27 RX ORDER — DONEPEZIL HYDROCHLORIDE 5 MG/1
TABLET, FILM COATED ORAL
Qty: 276 TABLET | Refills: 0 | Status: SHIPPED | OUTPATIENT
Start: 2018-04-27 | End: 2018-07-24 | Stop reason: SINTOL

## 2018-04-29 ENCOUNTER — TELEPHONE (OUTPATIENT)
Dept: UROGYNECOLOGY | Facility: CLINIC | Age: 78
End: 2018-04-29

## 2018-04-29 DIAGNOSIS — N30.00 ACUTE CYSTITIS WITHOUT HEMATURIA: Primary | ICD-10-CM

## 2018-04-29 RX ORDER — CEPHALEXIN 500 MG/1
500 CAPSULE ORAL EVERY 12 HOURS
Qty: 10 CAPSULE | Refills: 0 | Status: SHIPPED | OUTPATIENT
Start: 2018-04-29 | End: 2018-05-04

## 2018-04-29 NOTE — TELEPHONE ENCOUNTER
Attempted to reach patient regarding Uti. Rx sent in to patient's pharmacy on file. MIGUELINA Esquivel-BC

## 2018-05-01 ENCOUNTER — EXTERNAL CHRONIC CARE MANAGEMENT (OUTPATIENT)
Dept: PRIMARY CARE CLINIC | Facility: CLINIC | Age: 78
End: 2018-05-01
Payer: MEDICARE

## 2018-05-09 ENCOUNTER — TELEPHONE (OUTPATIENT)
Dept: INTERNAL MEDICINE | Facility: CLINIC | Age: 78
End: 2018-05-09

## 2018-05-09 DIAGNOSIS — N81.89 PELVIC FLOOR WEAKNESS: ICD-10-CM

## 2018-05-09 DIAGNOSIS — Z90.710 S/P LAPAROSCOPIC ASSISTED VAGINAL HYSTERECTOMY (LAVH): ICD-10-CM

## 2018-05-09 DIAGNOSIS — N39.46 MIXED STRESS AND URGE URINARY INCONTINENCE: Primary | ICD-10-CM

## 2018-05-09 NOTE — TELEPHONE ENCOUNTER
Please place new referral pt would like to change Urology provider, would like to stay within [facility]. Pt does not want to see Lyssa Cerrato any longer, after referral placed please set up appt. Pt only wants to see MD in urology not NP.

## 2018-05-09 NOTE — TELEPHONE ENCOUNTER
----- Message from Anna Pro sent at 5/9/2018  9:22 AM CDT -----  Contact: self [phone]  Type: Referral to a Specialist    Where would you like a referral to?  Urology     Have you previously requested?  Yes     Is the physician within the [facility]?  Yes     Name and phone number of specialist: Dr. Peguero    Reason for appointment: patient need an update referral     Is an appointment scheduled with specialist? When? No     Comments: please advise, Thanks !

## 2018-05-11 ENCOUNTER — TELEPHONE (OUTPATIENT)
Dept: UROGYNECOLOGY | Facility: CLINIC | Age: 78
End: 2018-05-11

## 2018-05-11 NOTE — TELEPHONE ENCOUNTER
.    Yonny, this is Zoe I've received your request I'm returning your call from the urogyn clinic at [facility]. Trying to schedule a follow up appointment with , no answer left message for her to contact the office.

## 2018-05-11 NOTE — TELEPHONE ENCOUNTER
----- Message from Magdi Kinney sent at 5/11/2018 10:01 AM CDT -----  Contact: self/ [phone] cell  Type: Sooner appointment than  is able to schedule    When is the first available appointment?     What is the nature of the appointment?     What appointment type: epp    Comments: Please schedule the pt an appt with Dr. Wheeler.  Dr Jiang has put in a referral.

## 2018-05-16 ENCOUNTER — TELEPHONE (OUTPATIENT)
Dept: ORTHOPEDICS | Facility: CLINIC | Age: 78
End: 2018-05-16

## 2018-05-16 NOTE — TELEPHONE ENCOUNTER
----- Message from Sheree Oliveira sent at 5/16/2018  8:50 AM CDT -----  Contact: self  Pt called and stated that she injured a toe on her right foot, and that it is very red and swollen, with yellow pussy fluid underneath. Pt states that it is very painful when walking, and suspects she may have an infection. Pt stated that she has an appt to come in town to be seen on 5/22, so she was inquiring if there way any way that she can be seen on that day, or is there any way that Dr. Carter can call her in some medication. Pt would like for the nurse to give her a call back to discuss. Please advise.    Pt can be reached at [phone] (cell) or [phone] (work #, from 12p-6p)

## 2018-05-16 NOTE — TELEPHONE ENCOUNTER
Spoke with pt.  Pt lives in [place] and states she cannot come in for a visit before 5/22.   Advised her to seek urgent evaluation if symptoms worsen.  Pt scheduled to see Sharmin Mccarthy PA-C on 5/22 at 2 pm.   Pt will come to clinic after her 8 am appointment with another provider.   CLAUDIA Scott approved this.

## 2018-05-22 ENCOUNTER — HOSPITAL ENCOUNTER (OUTPATIENT)
Dept: RADIOLOGY | Facility: HOSPITAL | Age: 78
Discharge: HOME | End: 2018-05-22
Attending: PHYSICIAN ASSISTANT
Payer: MEDICARE

## 2018-05-22 ENCOUNTER — OFFICE VISIT (OUTPATIENT)
Dept: ORTHOPEDICS | Facility: CLINIC | Age: 78
End: 2018-05-22
Payer: MEDICARE

## 2018-05-22 ENCOUNTER — OFFICE VISIT (OUTPATIENT)
Dept: UROGYNECOLOGY | Facility: CLINIC | Age: 78
End: 2018-05-22
Payer: MEDICARE

## 2018-05-22 VITALS
BODY MASS INDEX: 24.98 KG/M2 | SYSTOLIC BLOOD PRESSURE: 140 MMHG | HEIGHT: 66 IN | WEIGHT: 155.44 LBS | DIASTOLIC BLOOD PRESSURE: 78 MMHG

## 2018-05-22 DIAGNOSIS — N39.0 FREQUENT UTI: ICD-10-CM

## 2018-05-22 DIAGNOSIS — M79.674 GREAT TOE PAIN, RIGHT: Primary | ICD-10-CM

## 2018-05-22 DIAGNOSIS — N95.2 ATROPHIC VAGINITIS: ICD-10-CM

## 2018-05-22 DIAGNOSIS — M79.674 PAIN OF TOE OF RIGHT FOOT: ICD-10-CM

## 2018-05-22 DIAGNOSIS — R14.0 ABDOMINAL BLOATING: ICD-10-CM

## 2018-05-22 DIAGNOSIS — R19.8 IRREGULAR BOWEL HABITS: ICD-10-CM

## 2018-05-22 DIAGNOSIS — Z90.710 S/P LAPAROSCOPIC ASSISTED VAGINAL HYSTERECTOMY (LAVH): ICD-10-CM

## 2018-05-22 DIAGNOSIS — K59.09 CONSTIPATION, CHRONIC: Primary | ICD-10-CM

## 2018-05-22 DIAGNOSIS — R35.1 NOCTURIA: ICD-10-CM

## 2018-05-22 DIAGNOSIS — N81.89 PELVIC FLOOR WEAKNESS: ICD-10-CM

## 2018-05-22 LAB
BILIRUB UR QL STRIP: NEGATIVE
CLARITY UR REFRACT.AUTO: ABNORMAL
COLOR UR AUTO: YELLOW
GLUCOSE UR QL STRIP: NEGATIVE
HGB UR QL STRIP: NEGATIVE
HYALINE CASTS UR QL AUTO: 8 /LPF
KETONES UR QL STRIP: NEGATIVE
LEUKOCYTE ESTERASE UR QL STRIP: NEGATIVE
MICROSCOPIC COMMENT: ABNORMAL
NITRITE UR QL STRIP: NEGATIVE
PH UR STRIP: 7 [PH] (ref 5–8)
PROT UR QL STRIP: NEGATIVE
RBC #/AREA URNS AUTO: 5 /HPF (ref 0–4)
SP GR UR STRIP: 1.02 (ref 1–1.03)
URN SPEC COLLECT METH UR: ABNORMAL
UROBILINOGEN UR STRIP-ACNC: NEGATIVE EU/DL

## 2018-05-22 PROCEDURE — 81001 URINALYSIS AUTO W/SCOPE: CPT

## 2018-05-22 PROCEDURE — 99999 PR PBB SHADOW E&M-EST. PATIENT-LVL IV: CPT | Mod: PBBFAC,,, | Performed by: OBSTETRICS & GYNECOLOGY

## 2018-05-22 PROCEDURE — 99214 OFFICE O/P EST MOD 30 MIN: CPT | Mod: S$PBB,25,, | Performed by: OBSTETRICS & GYNECOLOGY

## 2018-05-22 PROCEDURE — 73660 X-RAY EXAM OF TOE(S): CPT | Mod: 26,RT,, | Performed by: RADIOLOGY

## 2018-05-22 PROCEDURE — 99999 PR PBB SHADOW E&M-EST. PATIENT-LVL III: CPT | Mod: PBBFAC,,, | Performed by: PHYSICIAN ASSISTANT

## 2018-05-22 PROCEDURE — 51701 INSERT BLADDER CATHETER: CPT | Mod: PBBFAC | Performed by: OBSTETRICS & GYNECOLOGY

## 2018-05-22 PROCEDURE — 73660 X-RAY EXAM OF TOE(S): CPT | Mod: TC

## 2018-05-22 PROCEDURE — 87086 URINE CULTURE/COLONY COUNT: CPT

## 2018-05-22 PROCEDURE — 51701 INSERT BLADDER CATHETER: CPT | Mod: S$PBB,,, | Performed by: OBSTETRICS & GYNECOLOGY

## 2018-05-22 PROCEDURE — 99213 OFFICE O/P EST LOW 20 MIN: CPT | Mod: S$PBB,,, | Performed by: PHYSICIAN ASSISTANT

## 2018-05-22 PROCEDURE — 99214 OFFICE O/P EST MOD 30 MIN: CPT | Mod: PBBFAC,25,27 | Performed by: OBSTETRICS & GYNECOLOGY

## 2018-05-22 PROCEDURE — 99213 OFFICE O/P EST LOW 20 MIN: CPT | Mod: PBBFAC,25 | Performed by: PHYSICIAN ASSISTANT

## 2018-05-22 RX ORDER — SILVER SULFADIAZINE 10 G/1000G
CREAM TOPICAL
Qty: 50 G | Refills: 1 | Status: SHIPPED | OUTPATIENT
Start: 2018-05-22 | End: 2018-06-20 | Stop reason: SDUPTHER

## 2018-05-22 NOTE — PROGRESS NOTES
Subjective:      Patient ID: Theresa Hook is a 77 y.o. female.    Chief Complaint: No chief complaint on file.    HPI    Patient is a 77 year old female who presents to clinic with chief complaint of right great toe pain. Giselle stated that 1 week ago she injured her right great toe and stated that it became very red swollen and painful. She stated that the lady that was doing her pedicure sad a yellow puss like fluid came from the corner of her toe nail. She stated that she has been treating with neosporin and a sandal.  She reports she has had a decrease in symptoms and that it is almost completely resolved. Denied fever chills.     Review of Systems   Constitution: Negative for chills and fever.   Cardiovascular: Negative for chest pain.   Respiratory: Negative for cough and shortness of breath.    Skin: Negative for color change, dry skin, itching, nail changes, poor wound healing and rash.   Musculoskeletal:        Right great toe pain   Neurological: Negative for dizziness.   Psychiatric/Behavioral: Negative for altered mental status. The patient is not nervous/anxious.    All other systems reviewed and are negative.        Objective:      General    Constitutional: She is oriented to person, place, and time. She appears well-developed and well-nourished. No distress.   HENT:   Head: Atraumatic.   Eyes: Conjunctivae are normal.   Cardiovascular: Normal rate.    Pulmonary/Chest: Effort normal.   Neurological: She is alert and oriented to person, place, and time.   Psychiatric: She has a normal mood and affect. Her behavior is normal.         Right Ankle/Foot Exam     Range of Motion   The patient has normal right ankle ROM.    Muscle Strength   The patient has normal right ankle strength.    Other   Sensation: normal    Comments:  Mild TTP to medial great toe nail tip, no erythema increased warmth or drainage.           RADS: no fracture or dislocation.   Assessment:       Encounter Diagnosis   Name  Primary?    Great toe pain, right Yes          Plan:       Discussed plan with patient.    Put antibiotic ointment on your toe.   She will not wear too tight around your toes  She will also be careful on cutting your toenails straight across and not too short  If continued problems she is to follow up with podiatry.

## 2018-05-22 NOTE — PROGRESS NOTES
Urogyn follow up    [facility] - GYNECOLOGY  [address]  [place] [address]    Theresa Hook  [id number]  1940      Theresa Hook is a 77 y.o. here for a urogyn follow up.    10/22/15    Title of Operation:  1) Laparoscopic-assisted total vaginal hysterectomy  2) Laparoscopic bilateral salpingo-oophorectomy  3) Bilateral uterosacral vaginal vault suspension  4) Anterior repair  5) Placement of tension-free transobuturator midurethral sling, TOT/Monarc  6) Cystourethroscopy    Indications for Surgery:  1) UI: (+) SHAHLA (when really has sneezing/coughing) > (+) UUI (1st AM). (+) pads (liner):1/day, usually damp. Daytime frequency: Q 4-5 hours. Nocturia: No: (--) dysuria, (--) hematuria, (--) frequent UTIs. (--) complete bladder emptying. DV occasionally--small 2nd void. +slow stream.   2) POP: Present. below introitus. Symptoms:(--) +can feel it, but doesn't really bother. (--) vaginal bleeding. (--) vaginal discharge. (--) sexually active. (--) Vaginal dryness. (--) vaginal estrogen use. History of breast cancer- +E2 - radiation treatment only. She is not interested in pessary placement.   --POP-Q: Aa +3; Ba +3; C -5; Ap -1; Bp -1; D -7. Genital hiatus 3, perineal body 1, total vaginal length 10-11.  3) BM: (--) constipation/straining. (--) chronic diarrhea. (--) hematochezia. (--) fecal incontinence. (+) fecal smearing/urgency. (--) incomplete evacuation. Eats high fiber diet.    Changes from last visit:  1) UI: (+) SHAHLA (+) UUI (+) pads:2/day, usually minimum wetness Daytime frequency: Q 3 -4hours. Nocturia: No: . (--) dysuria, (--) hematuria, (--) frequent UTIs. (--) complete bladder emptying. Double voids. Did not go to pelvic floor PT.  --3. URETHRAL FUNCTION/STORAGE PHASE:  a. WITH prolapse reduction:  · CLPP (150 mL): Negative at 81 cm H20  · VLPP (150 mL): Negative at 71 cm H20  · CLPP (300 mL): Negative at 122 cm H20  · VLPP (300 mL): Negative at 91 cm H20  · CLPP (MAX ): Negative  at 105 cm H20  · VLPP (MAX): Negative at 85 cm H20  · With pves catheters removed: POS CLPP and VLPP at max cap  These findings are consistent with Positive urodynamic stress incontinence only at max cap with pves catheter removed.  Assessment:  UF normal. PF with elevated PVR, but other studies (clinic PVR and void after cysto) confirmed normal emptying. Compliance normal. Max capacity ~650 mL. DO (--). SHAHLA (+) only at max cap once pves catheter removed.     2) POP: Present. below introitus. Symptoms:(--) (--) vaginal bleeding. (--) vaginal discharge. (--) sexually active. (+) Vaginal dryness. (--) vaginal estrogen use.   --POP-Q: Aa +3; Ba +3; C -5; Ap -1; Bp -1; D -7. Genital hiatus 3, perineal body 1, total vaginal length 10-11.    3) BM: (--) constipation/straining. (--) chronic diarrhea. (--) hematochezia. (--) fecal incontinence. (+) fecal smearing/urgency. (--) incomplete evacuation. Does not use fiber supplementation.    4) h/o left BRCA:  Pelvic US 9/15:  The uterus is normal in size, measuring 7.5 x 3.1 x 4.2 cm. The endometrial stripe is uniform in thickness measuring 2 mm. Multiple scattered dystrophic calcifications are noted throughout the uterine parenchyma, similar to the prior. Numerous   nabothian cysts are again noted. The ovaries are not visualized possibly secondary to their small size or surgical removal.  --has has had some abd distension since POP started; has gone up clothing size; no N/V    Preoperative Diagnosis:  1.  Rectocele      2.  Vaginal atrophy     3.  Uterovaginal prolapse, incomplete     4.  Cystocele     5.  Mixed incontinence urge and stress (male)(female)       Postoperative Diagnosis:  1.  Rectocele      2.  Vaginal atrophy     3.  Uterovaginal prolapse, incomplete     4.  Cystocele     5.  Mixed incontinence urge and stress (male)(female)         Issues include:  Patient Active Problem List   Diagnosis    HTN (hypertension)    Interval gout    Breast cancer, 2005, left  breast lumpectomy x 2, lymph nodes negative, XRT, tamoxifem 5 years.    Skin cancer, 2005 squamous cell right nasolabial fold.    [place] (hard of hearing)    Atrophic vaginitis    Mixed stress and urge urinary incontinence    S/P laparoscopic assisted vaginal hysterectomy (LAVH)    Nocturia    Personal history of breast cancer    Pelvic floor weakness    Imbalance due to old stroke    Need for Tdap vaccination    Need for vaccination with 13-polyvalent pneumococcal conjugate vaccine    Paroxysmal atrial fibrillation    Old embolic stroke without late effect, assoc a fib     S/P coronary artery stent placement,     Amnestic MCI (mild cognitive impairment with memory loss)    H/O: stroke    Gait instability    Family history of Charcot foot disease    Encounter for screening mammogram for malignant neoplasm of breast    Constipation, chronic    Abdominal bloating    Frequent UTI    Irregular bowel habits     History since last visit: Report vaginal bleeding a 1-2 weeks ago when she wiped after voiding. Ran out of silvadene.    Bladder issues: Denies UI, urinary urgency, or frequency. No current dysuria.  Nocturia 2-3 times/ night. Was treated for UTI.  Bowel issues: +new low abd swelling/gas; +hemorrhoid; +Constipation intermittent with loose stool; taking gummy fibers 2 in AM and 1 at PM--helps mostly but has had increased problems x last month (intermittent C/D with fecal urgency/close calls).  Takes tramadol PRN for foot pain--has been using more x last month; stopped tylenol codeine.  Not taking stool softener.  Did not set up CRS consult yet.   She has had a CVA in 06/2016 and had cardiac stent placed 11/2016.  Frequent UTIs: typical symptoms: urgency, pain with urination, increased nocturia; has had 3 in last few months. 10/17 e coli.  4/10/18 C&S neg.  4/24/18 C&S + e coli. Did not get Rx x 1 week. Is upset about this.   Had 3 UTIs documented in 2017.     --has had increased  "urinary frequency/small amounts; feels emptying well; can have small volume voids; PV PPx to help   --is finishing course of Keflex (10 days).      Well Woman:  Pap:2009-- normal-- post hysterectomy  Mammo:1/18  Stable post-surgical scar in the left breast is benign-negative.  No suspicious finding in either breast.  Mammogram in 1 year is recommended.  Colonoscopy:10/2012- normal-- repeat 2019  Dexa:01/25/2017 Normal bone mineral density    Current Outpatient Prescriptions   Medication Sig    apixaban (ELIQUIS ORAL) Take by mouth.    aspirin (ASPIRIN LOW DOSE) 81 MG EC tablet 2 Tablet, Delayed Release (E.C.) Oral At bedtime    cetirizine (ZYRTEC) 10 MG tablet Take 10 mg by mouth once daily.    clobetasol (TEMOVATE) 0.05 % external solution Apply topically. .  AAA scalp qd - bid    donepezil (ARICEPT) 5 MG tablet TAKE 1 TABLET AT BEDTIME FOR MEMORY    memantine (NAMENDA) 10 MG Tab Take 5 mg by mouth 2 (two) times daily.    mometasone (ELOCON) 0.1 % cream Apply 1 application topically Use as needed.   face    pravastatin (PRAVACHOL) 20 MG tablet Take by mouth.    propafenone (RHTHYMOL) 150 MG Tab Take 150 mg by mouth every 8 (eight) hours.    silver sulfADIAZINE 1% (SILVADENE) 1 % cream Apply topically 3 (three) times a week. Use dime-sized amount with finger 3 times/week.    triamcinolone acetonide 0.1% (KENALOG) 0.1 % cream Apply topically Twice daily.   disp:  60g tube AAA      lisinopril (PRINIVIL,ZESTRIL) 20 MG tablet lisinopril 20 mg tablet   Take 1 tablet every day by oral route.    silver sulfADIAZINE 1% (SILVADENE) 1 % cream Apply to affected area 2x/week     No current facility-administered medications for this visit.      ROS:  As per HPI.      Exam  BP (!) 140/78 (Patient Position: Sitting, BP Method: Medium (Manual))   Ht 5' 6" (1.676 m)   Wt 70.5 kg (155 lb 6.8 oz)   BMI 25.09 kg/m²   General: alert and oriented, no acute distress  Respiratory: normal respiratory effort  Abd: soft, " non-tender, non-distended.      Pelvic  Ext. Genitalia: normal external genitalia. Normal bartholin's and skenes glands.  Atrophy of labia noted.   Vagina: + atrophy. Normal vaginal mucosa without lesions. No discharge noted.   Non-tender bladder base without palpable mass.  No mesh palpable/visible.    Cervix: absent  Uterus:  absent   Urethra: no masses or tenderness  Urethral meatus: no lesions, caruncle or prolapse.    POP-Q: Aa/Ba -1; C -9; Ba/Bp -2, TVL 11 cm.       PVR 20 mL      Impression  1. Constipation, chronic  Ambulatory consult to Gastroenterology   2. Abdominal bloating  Ambulatory consult to Gastroenterology   3. Atrophic vaginitis  silver sulfADIAZINE 1% (SILVADENE) 1 % cream   4. Frequent UTI  CT Urogram Abd Pelvis W WO    Cystoscopy    Creatinine, serum    Urine culture    Urinalysis   5. Irregular bowel habits     6. Nocturia     7. Pelvic floor weakness     8. S/P laparoscopic assisted vaginal hysterectomy (LAVH)       We reviewed the above issues and discussed options for short-term versus long-term management of her problems.   Plan:   1. Recent UTI  --urine culture sent today  --get bowel movements better controlled  --continue silvadene -- dime size amount twice weekly  --get CT A/P with/without contrast, cystoscopy    2.  Mixed urinary incontinence, urge > stress:  (not bothersome at this time)  --urine C&S sent today; if feel have UTI, call us so we can evaluate  --Empty bladder every 3 hours.  Empty well: wait a minute, lean forward on toilet.    --Avoid dietary irritants (see sheet).  Keep diary x 3-5 days to determine your irritants.  --KEGELS: do 10 in AM and 10 in PM, holding each x 10 seconds.  When you feel urge to go, STOP, KEGEL, and when urge has passed, then go to bathroom.  --URGE: consider anticholinergic.  Takes 2-4 weeks to see if will have effect.  For dry mouth: get sour, sugar free lozenge or gum.    --STRESS:  Pessary vs. Sling.     3.  Vaginal atrophy (dryness):  continue silvadene -- dime size amount twice weekly    4.  Irregular bowel habits/ abdominal bloating  --Continue fiber gummies (2 in Am and 1 in PM)/high fiber diet.  Always try to minimize straining with bowel movements  --when you take tramadol or any narcotic, also take 1 stool softener (EX: ducosate sodium)  --hydrate well (2-3 extra glasses a day)  --referral to GI    5.  RTC for cystoscopy.     45 minutes were spent in face to face time with this patient  90 % of this time was spent in counseling and/or coordination of care    [facility]  Division of Female Pelvic Medicine and Reconstructive Surgery  Department of Obstetrics & Gynecology

## 2018-05-22 NOTE — PATIENT INSTRUCTIONS
1. Recent UTI  --urine culture sent today  --get bowel movements better controlled  --continue silvadene -- dime size amount twice weekly  --get CT A/P with/without contrast, cystoscopy    2.  Mixed urinary incontinence, urge > stress:  (not bothersome at this time)  --urine C&S sent today; if feel have UTI, call us so we can evaluate  --Empty bladder every 3 hours.  Empty well: wait a minute, lean forward on toilet.    --Avoid dietary irritants (see sheet).  Keep diary x 3-5 days to determine your irritants.  --KEGELS: do 10 in AM and 10 in PM, holding each x 10 seconds.  When you feel urge to go, STOP, KEGEL, and when urge has passed, then go to bathroom.  --URGE: consider anticholinergic.  Takes 2-4 weeks to see if will have effect.  For dry mouth: get sour, sugar free lozenge or gum.    --STRESS:  Pessary vs. Sling.     3.  Vaginal atrophy (dryness): continue silvadene -- dime size amount twice weekly    4.  Irregular bowel habits/ abdominal bloating  --Continue fiber gummies (2 in Am and 1 in PM)/high fiber diet.  Always try to minimize straining with bowel movements  --when you take tramadol or any narcotic, also take 1 stool softener (EX: ducosate sodium)  --hydrate well (2-3 extra glasses a day)  --referral to GI    5.  RTC for cystoscopy.

## 2018-05-22 NOTE — LETTER
May 22, 2018      Manuela Robertson MD  [address]  [place] [address]           [facility] - Gynecology  [address]  [place] [address]  Phone: [phone]          Patient: Theresa Hook   MR Number: [id number]   YOB: 1940   Date of Visit: 5/22/2018       Dear Dr. Manuela Robertson:    Thank you for referring Theresa Hook to me for evaluation. Attached you will find relevant portions of my assessment and plan of care.    If you have questions, please do not hesitate to call me. I look forward to following Theresa Hook along with you.    Sincerely,    Reyna Wheeler MD    Enclosure  CC:  No Recipients    If you would like to receive this communication electronically, please contact [email or web address] or [phone] to request more information on [employer] Link access.    For providers and/or their staff who would like to refer a patient to [facility], please contact us through our one-stop-shop provider referral line, [facility], at [phone].    If you feel you have received this communication in error or would no longer like to receive these types of communications, please e-mail [email or web address]

## 2018-05-23 LAB — BACTERIA UR CULT: NO GROWTH

## 2018-05-31 PROCEDURE — 99490 CHRNC CARE MGMT STAFF 1ST 20: CPT | Mod: PBBFAC | Performed by: INTERNAL MEDICINE

## 2018-05-31 PROCEDURE — 99490 CHRNC CARE MGMT STAFF 1ST 20: CPT | Mod: S$PBB,,, | Performed by: INTERNAL MEDICINE

## 2018-06-03 ENCOUNTER — NURSE TRIAGE (OUTPATIENT)
Dept: ADMINISTRATIVE | Facility: CLINIC | Age: 78
End: 2018-06-03

## 2018-06-03 DIAGNOSIS — R30.0 DYSURIA: Primary | ICD-10-CM

## 2018-06-03 NOTE — TELEPHONE ENCOUNTER
"  Reason for Disposition   Urinating more frequently than usual (i.e., frequency)    Answer Assessment - Initial Assessment Questions  1. SYMPTOM: "What's the main symptom you're concerned about?" (e.g., frequency, incontinence)      Frequency,urgency, burning, chills  2. ONSET: "When did the  ________  start?"   6/1  3. PAIN: "Is there any pain?" If so, ask: "How bad is it?" (Scale: 1-10; mild, moderate, severe)      no  4. CAUSE: "What do you think is causing the symptoms?"  UTI  5. OTHER SYMPTOMS: "Do you have any other symptoms?" (e.g., fever, flank pain, blood in urine, pain with urination)     Diarrhea, pain with urination  6. PREGNANCY: "Is there any chance you are pregnant?" "When was your last menstrual period?"  n/a    Protocols used: ST URINARY SYMPTOMS-A-    "

## 2018-06-04 ENCOUNTER — LAB VISIT (OUTPATIENT)
Dept: LAB | Facility: HOSPITAL | Age: 78
End: 2018-06-04
Attending: OPTOMETRIST
Payer: MEDICARE

## 2018-06-04 ENCOUNTER — TELEPHONE (OUTPATIENT)
Dept: UROGYNECOLOGY | Facility: CLINIC | Age: 78
End: 2018-06-04

## 2018-06-04 DIAGNOSIS — R30.0 DYSURIA: ICD-10-CM

## 2018-06-04 DIAGNOSIS — R30.0 DYSURIA: Primary | ICD-10-CM

## 2018-06-04 PROCEDURE — 81000 URINALYSIS NONAUTO W/SCOPE: CPT

## 2018-06-04 PROCEDURE — 87077 CULTURE AEROBIC IDENTIFY: CPT

## 2018-06-04 PROCEDURE — 87186 SC STD MICRODIL/AGAR DIL: CPT

## 2018-06-04 PROCEDURE — 87088 URINE BACTERIA CULTURE: CPT

## 2018-06-04 PROCEDURE — 87086 URINE CULTURE/COLONY COUNT: CPT

## 2018-06-04 RX ORDER — CEPHALEXIN 500 MG/1
500 CAPSULE ORAL 2 TIMES DAILY
Qty: 14 CAPSULE | Refills: 0 | Status: SHIPPED | OUTPATIENT
Start: 2018-06-04 | End: 2018-06-11

## 2018-06-04 NOTE — TELEPHONE ENCOUNTER
Spoke to pt she reports she's been suffering from UTI symptoms x4 days, she reports urgency, leaking, frequency and burning on urination, she also reports back back but reports she has a history of chronic back pain, but has not gotten any worst than usual. She denies blood in urination, fever,chills nausea and vomiting. She reports she's taking OTC azo to help with symptoms.     Orders placed for UA and culture. Pt states she will drop off specimen later today. Pt's pharmacy was verified.

## 2018-06-05 LAB
BACTERIA #/AREA URNS HPF: ABNORMAL /HPF
BILIRUB UR QL STRIP: NEGATIVE
CLARITY UR: ABNORMAL
COLOR UR: YELLOW
GLUCOSE UR QL STRIP: NEGATIVE
HGB UR QL STRIP: ABNORMAL
HYALINE CASTS #/AREA URNS LPF: 0 /LPF
KETONES UR QL STRIP: ABNORMAL
LEUKOCYTE ESTERASE UR QL STRIP: ABNORMAL
MICROSCOPIC COMMENT: ABNORMAL
NITRITE UR QL STRIP: POSITIVE
PH UR STRIP: 5 [PH] (ref 5–8)
PROT UR QL STRIP: ABNORMAL
RBC #/AREA URNS HPF: 3 /HPF (ref 0–4)
SP GR UR STRIP: 1.02 (ref 1–1.03)
URN SPEC COLLECT METH UR: ABNORMAL
UROBILINOGEN UR STRIP-ACNC: NEGATIVE EU/DL
WBC #/AREA URNS HPF: >100 /HPF (ref 0–5)

## 2018-06-08 LAB — BACTERIA UR CULT: NORMAL

## 2018-06-09 ENCOUNTER — TELEPHONE (OUTPATIENT)
Dept: UROGYNECOLOGY | Facility: CLINIC | Age: 78
End: 2018-06-09

## 2018-06-09 DIAGNOSIS — Z79.2 PROPHYLACTIC ANTIBIOTIC: Primary | ICD-10-CM

## 2018-06-09 RX ORDER — CEPHALEXIN 250 MG/1
250 CAPSULE ORAL DAILY
Qty: 20 CAPSULE | Refills: 0 | Status: SHIPPED | OUTPATIENT
Start: 2018-06-09 | End: 2018-06-24 | Stop reason: SDUPTHER

## 2018-06-09 NOTE — TELEPHONE ENCOUNTER
Please let patient know C&S was + for UTI, and Keflex sent in should have worked.  I have sent in additional Keflex so that when she's done with taking it 2x/day x 5 days, she should take it once a day after that to prevent infection until her upcoming cystoscopy with me 6/20/18.  Please remind her to  additional Rx and start.  We are getting CT and cystoscopy too make sure nothing else could be going on that's contributing to her UTIs. I will discuss plan regarding what we need to do about them at that visit, once I have results back from those tests.  Thanks!

## 2018-06-11 ENCOUNTER — TELEPHONE (OUTPATIENT)
Dept: UROGYNECOLOGY | Facility: CLINIC | Age: 78
End: 2018-06-11

## 2018-06-11 NOTE — TELEPHONE ENCOUNTER
Spoke with patient know C&S was + for UTI, and Keflex sent in should have worked.  I have sent in additional Keflex so that when she's done with taking it 2x/day x 5 days, she should take it once a day after that to prevent infection until her upcoming cystoscopy with me 6/20/18.  Please remind her to  additional Rx and start.  We are getting CT and cystoscopy too make sure nothing else could be going on that's contributing to her UTIs. I will discuss plan regarding what we need to do about them at that visit, once I have results back from those tests.      She has increased her nightly fiber, which has helped a lot with fecal smearing.  She may also try probiotic.

## 2018-06-11 NOTE — TELEPHONE ENCOUNTER
Attempted to contact to inform her that her C&S was positive for UTI, and Keflex sent in should have worked.  Dr. Wheeler have sent in additional Keflex so that when she's done with taking it 2x/day x 5 days, she should take it once a day after that to prevent infection until her upcoming cystoscopy on 6/20/18. And also to remind her to  additional Rx and start.  We are getting CT and cystoscopy too make sure nothing else could be going on that's contributing to her UTIs. Dr. Wheeler will discuss plan regarding what needs to be done about them at that visit, once  she have results back from those tests

## 2018-06-20 ENCOUNTER — HOSPITAL ENCOUNTER (OUTPATIENT)
Dept: RADIOLOGY | Facility: OTHER | Age: 78
Discharge: HOME | End: 2018-06-20
Attending: OBSTETRICS & GYNECOLOGY
Payer: MEDICARE

## 2018-06-20 ENCOUNTER — OFFICE VISIT (OUTPATIENT)
Dept: UROGYNECOLOGY | Facility: CLINIC | Age: 78
End: 2018-06-20
Payer: MEDICARE

## 2018-06-20 VITALS
HEIGHT: 66 IN | RESPIRATION RATE: 16 BRPM | WEIGHT: 159.19 LBS | SYSTOLIC BLOOD PRESSURE: 110 MMHG | DIASTOLIC BLOOD PRESSURE: 60 MMHG | BODY MASS INDEX: 25.58 KG/M2

## 2018-06-20 DIAGNOSIS — I25.10 CORONARY ATHEROSCLEROSIS OF NATIVE CORONARY ARTERY: ICD-10-CM

## 2018-06-20 DIAGNOSIS — I48.20 CHRONIC ATRIAL FIBRILLATION: ICD-10-CM

## 2018-06-20 DIAGNOSIS — N39.0 FREQUENT UTI: ICD-10-CM

## 2018-06-20 DIAGNOSIS — Z79.2 PROPHYLACTIC ANTIBIOTIC: ICD-10-CM

## 2018-06-20 DIAGNOSIS — R00.1 BRADYCARDIA, SINUS: Primary | ICD-10-CM

## 2018-06-20 LAB
BILIRUB SERPL-MCNC: NORMAL MG/DL
BLOOD URINE, POC: NORMAL
COLOR, POC UA: NORMAL
GLUCOSE UR QL STRIP: NORMAL
KETONES UR QL STRIP: NORMAL
LEUKOCYTE ESTERASE URINE, POC: NORMAL
NITRITE, POC UA: NORMAL
PH, POC UA: 6
PROTEIN, POC: NORMAL
SPECIFIC GRAVITY, POC UA: 1
UROBILINOGEN, POC UA: NORMAL

## 2018-06-20 PROCEDURE — 99499 UNLISTED E&M SERVICE: CPT | Mod: S$PBB,,, | Performed by: OBSTETRICS & GYNECOLOGY

## 2018-06-20 PROCEDURE — 52000 CYSTOURETHROSCOPY: CPT | Mod: PBBFAC | Performed by: OBSTETRICS & GYNECOLOGY

## 2018-06-20 PROCEDURE — 74178 CT ABD&PLV WO CNTR FLWD CNTR: CPT | Mod: TC

## 2018-06-20 PROCEDURE — 99213 OFFICE O/P EST LOW 20 MIN: CPT | Mod: PBBFAC,25

## 2018-06-20 PROCEDURE — 81002 URINALYSIS NONAUTO W/O SCOPE: CPT | Mod: PBBFAC

## 2018-06-20 PROCEDURE — 99999 PR PBB SHADOW E&M-EST. PATIENT-LVL III: CPT | Mod: PBBFAC,,,

## 2018-06-20 PROCEDURE — 52000 CYSTOURETHROSCOPY: CPT | Mod: S$PBB,,, | Performed by: OBSTETRICS & GYNECOLOGY

## 2018-06-20 PROCEDURE — 25500020 PHARM REV CODE 255: Performed by: OBSTETRICS & GYNECOLOGY

## 2018-06-20 PROCEDURE — 74178 CT ABD&PLV WO CNTR FLWD CNTR: CPT | Mod: 26,,, | Performed by: RADIOLOGY

## 2018-06-20 RX ORDER — CLOPIDOGREL BISULFATE 75 MG/1
TABLET ORAL
COMMUNITY
End: 2018-06-20

## 2018-06-20 RX ORDER — PRAVASTATIN SODIUM 40 MG/1
40 TABLET ORAL
COMMUNITY
End: 2023-12-07

## 2018-06-20 RX ORDER — LIDOCAINE HYDROCHLORIDE 20 MG/ML
JELLY TOPICAL
Status: DISCONTINUED | OUTPATIENT
Start: 2018-06-20 | End: 2020-09-29

## 2018-06-20 RX ORDER — AMIODARONE HYDROCHLORIDE 200 MG/1
TABLET ORAL
COMMUNITY
End: 2018-06-20

## 2018-06-20 RX ORDER — LISINOPRIL 20 MG/1
TABLET ORAL
COMMUNITY
End: 2018-06-20 | Stop reason: SDUPTHER

## 2018-06-20 RX ADMIN — IOHEXOL 125 ML: 350 INJECTION, SOLUTION INTRAVENOUS at 01:06

## 2018-06-24 ENCOUNTER — TELEPHONE (OUTPATIENT)
Dept: UROGYNECOLOGY | Facility: CLINIC | Age: 78
End: 2018-06-24

## 2018-06-24 RX ORDER — CEPHALEXIN 250 MG/1
250 CAPSULE ORAL DAILY
Qty: 20 CAPSULE | Refills: 0 | Status: SHIPPED | OUTPATIENT
Start: 2018-06-24 | End: 2018-07-24 | Stop reason: ALTCHOICE

## 2018-06-24 NOTE — TELEPHONE ENCOUNTER
Please call patient and let her know to continue Keflex 250 mg daily.  I sent in refill.  She should finish her current Rx, then start that one, and stop when done.      Please help her make appt to see me back in 3 months.     Also, she had mentioned wanting to go to PT for her back.  If she can give us the name of the one in [facility], we can send referral.  She should also let her PCP know about the pain.  Once she gives you the name of the PT,  please fax the following there:    Please fill out signed Rx:    Dx:  Low back pain/lumbago  Rx: PT to improve pain    Please fax with copy of last clinic note & demographic information to:      PT info she gives you for [facility].    Thanks!

## 2018-06-24 NOTE — PROGRESS NOTES
Title of Operation:   Cystourethroscopy.     INDICATIONS:  History since last visit: Report vaginal bleeding a 1-2 weeks ago when she wiped after voiding. Ran out of silvadene.    Bladder issues: Denies UI, urinary urgency, or frequency. No current dysuria.  Nocturia 2-3 times/ night. Was treated for UTI.  Bowel issues: +new low abd swelling/gas; +hemorrhoid; +Constipation intermittent with loose stool; taking gummy fibers 2 in AM and 1 at PM--helps mostly but has had increased problems x last month (intermittent C/D with fecal urgency/close calls).  Takes tramadol PRN for foot pain--has been using more x last month; stopped tylenol codeine.  Not taking stool softener.  Did not set up CRS consult yet.   She has had a CVA in 06/2016 and had cardiac stent placed 11/2016.  Frequent UTIs: typical symptoms: urgency, pain with urination, increased nocturia; has had 3 in last few months. 10/17 e coli.  4/10/18 C&S neg.  4/24/18 C&S + e coli. Did not get Rx x 1 week. Is upset about this.   Had 3 UTIs documented in 2017.                --has had increased urinary frequency/small amounts; feels emptying well; can have small volume voids; PV PPx to help              --is finishing course of Keflex (10 days).      PREOPERATIVE DIAGNOSIS  1. Constipation, chronic    2. Abdominal bloating    3. Atrophic vaginitis    4. Frequent UTI    5. Irregular bowel habits    6. Nocturia    7. Pelvic floor weakness    8. S/P laparoscopic assisted vaginal hysterectomy (LAVH)        POSTOPERATIVE DIAGNOSIS:   1. Constipation, chronic    2. Abdominal bloating    3. Atrophic vaginitis    4. Frequent UTI    5. Irregular bowel habits    6. Nocturia    7. Pelvic floor weakness    8. S/P laparoscopic assisted vaginal hysterectomy (LAVH)        Anesthesia:   2% Xylocaine gel.    Specimen (Bacteriological, Pathological or other):   None.     Prosthetic Device/Implant:   None.     Surgeons Narrative:     After informed consent was obtained, the patient  was placed in the lithotomy position. The urethral meatus was prepped with Betadine and 10 cubic centimeters of 2% Xylocaine gel were introduced into the urethra. A flexible cystourethroscope was introduced into the bladder. The bladder was distended with approximately 300 cubic centimeters of sterile water. A systematic survey was performed in which the bladder was surveyed using multiple sequential passes in a clockwise fashion from the bladder dome to the bladder base to the urethrovesical junction. The trigone and ureteral orifices were observed. The scope was then flipped back on itself, and the urethrovesical junction was viewed. A vaginal examining finger was then placed with pressure suburethrally at the urethrovesical junction as the telescope was withdrawn in order to perform positive pressure urethroscopy.  Standard maneuvers of cough, squeeze and Valsalva were performed. The telescope was then completely withdrawn.     Findings: Urethroscopy:  Normal.  Cystoscopy:  Normal bladder mucosa, bilateral ureteral flow was noted.     Assessment: Essentially normal cystourethroscopy.     Plan:  1. frequent UTI  --on daily Keflex; continue x 2 more weeks, then stop  --get bowel movements better controlled  --continue silvadene -- dime size amount twice weekly  --CT A/P 5/28/18: neg  findings  --cysto today normal     2.  Mixed urinary incontinence, urge > stress:  (not bothersome at this time)  --urine C&S sent today; if feel have UTI, call us so we can evaluate  --Empty bladder every 3 hours.  Empty well: wait a minute, lean forward on toilet.    --Avoid dietary irritants (see sheet).  Keep diary x 3-5 days to determine your irritants.  --KEGELS: do 10 in AM and 10 in PM, holding each x 10 seconds.  When you feel urge to go, STOP, KEGEL, and when urge has passed, then go to bathroom.  --URGE: consider anticholinergic.  Takes 2-4 weeks to see if will have effect.  For dry mouth: get sour, sugar free lozenge or  gum.    --STRESS:  Pessary vs. Sling.      3.  Vaginal atrophy (dryness): continue silvadene -- dime size amount twice weekly     4.  Irregular bowel habits/ abdominal bloating  --Continue fiber gummies (2 in Am and 1 in PM)/high fiber diet.  Patient reports much improved with fiber gummies.  Always try to minimize straining with bowel movements  --when you take tramadol or any narcotic, also take 1 stool softener (EX: ducosate sodium)  --hydrate well (2-3 extra glasses a day)  --referral to GI     5.  Lumbar pain:  --sounds [facility] in etiology  --would like to start PT in [facility]--will get us name    6.  RTC 3 months.

## 2018-06-25 NOTE — TELEPHONE ENCOUNTER
Spoke to pt, she was informed to continue Keflex 250 mg daily and a refill was sent to her pharmacy. She was also instructed to finish her current RX, then start new one and stop when finished. Pt verbalizes understanding.    Follow up appointment schedule 9/25/2018 at  location. Pt aware and verbalizes understanding.    Pt declined going to PT at this time she states she will talk to her PCP when she see her 7/2018.

## 2018-06-30 ENCOUNTER — EXTERNAL CHRONIC CARE MANAGEMENT (OUTPATIENT)
Dept: PRIMARY CARE CLINIC | Facility: CLINIC | Age: 78
End: 2018-06-30
Payer: MEDICARE

## 2018-06-30 PROCEDURE — 99490 CHRNC CARE MGMT STAFF 1ST 20: CPT | Mod: S$PBB,,, | Performed by: INTERNAL MEDICINE

## 2018-06-30 PROCEDURE — 99490 CHRNC CARE MGMT STAFF 1ST 20: CPT | Mod: PBBFAC | Performed by: INTERNAL MEDICINE

## 2018-07-24 ENCOUNTER — OFFICE VISIT (OUTPATIENT)
Dept: INTERNAL MEDICINE | Facility: CLINIC | Age: 78
End: 2018-07-24
Payer: MEDICARE

## 2018-07-24 VITALS
OXYGEN SATURATION: 97 % | SYSTOLIC BLOOD PRESSURE: 130 MMHG | HEART RATE: 70 BPM | HEIGHT: 66 IN | BODY MASS INDEX: 25.47 KG/M2 | DIASTOLIC BLOOD PRESSURE: 60 MMHG | WEIGHT: 158.5 LBS

## 2018-07-24 DIAGNOSIS — Z00.00 ANNUAL PHYSICAL EXAM: Primary | ICD-10-CM

## 2018-07-24 DIAGNOSIS — C50.412 MALIGNANT NEOPLASM OF UPPER-OUTER QUADRANT OF LEFT FEMALE BREAST, UNSPECIFIED ESTROGEN RECEPTOR STATUS: Chronic | ICD-10-CM

## 2018-07-24 DIAGNOSIS — I48.0 PAROXYSMAL ATRIAL FIBRILLATION: ICD-10-CM

## 2018-07-24 DIAGNOSIS — Z12.39 BREAST CANCER SCREENING: ICD-10-CM

## 2018-07-24 DIAGNOSIS — Z79.01 ANTICOAGULANT LONG-TERM USE: ICD-10-CM

## 2018-07-24 DIAGNOSIS — I10 ESSENTIAL HYPERTENSION: Chronic | ICD-10-CM

## 2018-07-24 DIAGNOSIS — Z86.73 H/O: STROKE: ICD-10-CM

## 2018-07-24 DIAGNOSIS — G31.84 AMNESTIC MCI (MILD COGNITIVE IMPAIRMENT WITH MEMORY LOSS): ICD-10-CM

## 2018-07-24 DIAGNOSIS — Z95.5 S/P CORONARY ARTERY STENT PLACEMENT: ICD-10-CM

## 2018-07-24 PROBLEM — R14.0 ABDOMINAL BLOATING: Status: RESOLVED | Noted: 2018-05-22 | Resolved: 2018-07-24

## 2018-07-24 PROCEDURE — 99999 PR PBB SHADOW E&M-EST. PATIENT-LVL IV: CPT | Mod: PBBFAC,,, | Performed by: INTERNAL MEDICINE

## 2018-07-24 PROCEDURE — 99214 OFFICE O/P EST MOD 30 MIN: CPT | Mod: S$PBB,,, | Performed by: INTERNAL MEDICINE

## 2018-07-24 PROCEDURE — 99214 OFFICE O/P EST MOD 30 MIN: CPT | Mod: PBBFAC | Performed by: INTERNAL MEDICINE

## 2018-07-24 RX ORDER — TRAMADOL HYDROCHLORIDE 50 MG/1
50 TABLET ORAL EVERY 12 HOURS PRN
COMMUNITY
End: 2019-02-26 | Stop reason: SINTOL

## 2018-07-24 RX ORDER — ACETAMINOPHEN 325 MG/1
TABLET ORAL
Qty: 100 TABLET | Refills: 0 | Status: SHIPPED | OUTPATIENT
Start: 2018-07-24 | End: 2022-04-04

## 2018-07-24 NOTE — PATIENT INSTRUCTIONS
ShingRIX two shot shingles vaccine, conjugate, 2nd shot 2-6 months after the first.  Update tetanus or TdAP       Chondroitin; Glucosamine tablets or capsules  What is this medicine?  CHONDROITIN; GLUCOSAMINE (awilda DROI tin; gloo KOH deepti hitchcock) is a dietary supplement. It is promoted for its ability to reduce the symptoms of osteoarthritis by maintaining healthy joint cartilage. The FDA has not approved this supplement for any medical use.  How should I use this medicine?  Take this supplement by mouth with a glass of water. Follow the directions on the package labeling, or take as directed by your health care professional. Take your medicine at regular intervals. Do not take this supplement more often than directed.  Talk to your pediatrician regarding the use of this medicine in children. Special care may be needed.  What side effects may I notice from receiving this medicine?  Side effects that you should report to your doctor or health care professional as soon as possible:  · allergic reactions like skin rash, itching or hives, swelling of the face, lips, or tongue  · breathing problems  · constipation  · diarrhea  · difficulty sleeping  · drowsiness  · hair loss  · headache  · loss of appetite  · stomach pain  · swelling of the ankles or feet  Side effects that usually do not require medical attention (report to your doctor or health care professional if they continue or are bothersome):  · gas  · nausea  · upset stomach  What may interact with this medicine?  Check with your doctor or healthcare professional if you are taking any of the following medications:  · warfarin  What if I miss a dose?  If you miss a dose, take it as soon as you can. If it is almost time for your next dose, take only that dose. Do not take double or extra doses.  Where should I keep my medicine?  Keep out of the reach of children.  Store at room temperature between 15 and 30 degrees C (59 and 86 degrees F) or as directed on the  package label. Protect from moisture. Throw away any unused supplement after the expiration date.  What should I tell my health care provider before I take this medicine?  They need to know if you have any of these conditions:  · diabetes  · heart disease  · kidney disease  · liver disease  · stomach or intestinal problems  · an unusual or allergic reaction to chondroitin, glucosamine, sulfonamides, other medicines, foods, dyes, or preservatives  · pregnant or trying to get pregnant  · breast-feeding  What should I watch for while using this medicine?  Tell your doctor or healthcare professional if your symptoms do not start to get better or if they get worse. This supplement may take several weeks to work for you.  If you are scheduled for any medical or dental procedure, tell your healthcare provider that you are taking this supplement. You may need to stop taking this supplement before the procedure.  This supplement may affect blood sugar levels. If you have diabetes, check with your doctor or health care professional before you change your diet or the dose of your diabetic medicine.  Herbal or dietary supplements are not regulated like medicines. Rigid  standards are not required for dietary supplements. The purity and strength of these products can vary. The safety and effect of this dietary supplement for a certain disease or illness is not well known. This product is not intended to diagnose, treat, cure or prevent any disease.  The Food and Drug Administration suggests the following to help consumers protect themselves:  · Always read product labels and follow directions.  · Natural does not mean a product is safe for humans to take.  · Look for products that include USP after the ingredient name. This means that the  followed the standards of the [place] Pharmacopoeia.  · Supplements made or sold by a nationally known food or drug company are more likely to be made under tight  controls. You can write to the company for more information about how the product was made.  NOTE:This sheet is a summary. It may not cover all possible information. If you have questions about this medicine, talk to your doctor, pharmacist, or health care provider. Copyright© 2017 Gold Standard

## 2018-07-25 NOTE — PROGRESS NOTES
Subjective:       Patient ID: Theresa Hook is a 77 y.o. female.    Chief Complaint: Annual Exam; Leg Pain; Neck Pain; Back Pain; and Hand Pain   Wellness visit    This dictation was performed using using MModal.  She had to discontinue Aricept because of nightmares and also at the suggestion of her cardiologist.  Since she stopped taking Aricept which has been approximately 3 months ago she has not noticed any difficulty with her memory.  She has not had any stroke-like symptoms.    She noticed the little bump in her left armpit, but is in the skin, a sebaceous cyst.    She has not had any palpitations, her blood pressure has been well controlled in her energy level has been good.    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    Her right foot tendinitis has improved.  She has been taking ibuprofen on a regular basis and is asked to stop this because of it's cardiovascular risk.  Instead, she is asked to use Tylenol glucosamine.  HPI  Review of Systems   Constitutional: Negative for activity change, appetite change, chills, fatigue, fever and unexpected weight change.   HENT: Negative for dental problem, hearing loss, tinnitus, trouble swallowing and voice change.    Eyes: Negative for visual disturbance.   Respiratory: Negative for cough, chest tightness, shortness of breath and wheezing.    Cardiovascular: Negative for chest pain, palpitations and leg swelling.   Gastrointestinal: Negative for abdominal pain, blood in stool, constipation, diarrhea and nausea.   Genitourinary: Negative for difficulty urinating, dysuria, frequency and urgency.   Musculoskeletal: Negative for arthralgias, back pain, gait problem, joint swelling, myalgias, neck pain and neck stiffness.   Skin: Negative for rash.   Neurological: Negative for dizziness, tremors, speech difficulty, weakness, light-headedness and headaches.   Psychiatric/Behavioral: Negative for dysphoric mood and sleep disturbance.  The patient is not nervous/anxious.        Objective:      Physical Exam   Constitutional: She is oriented to person, place, and time. She appears well-developed and well-nourished. No distress.   HENT:   Head: Normocephalic and atraumatic.   Right Ear: External ear normal.   Left Ear: External ear normal.   Nose: Nose normal.   Mouth/Throat: Oropharynx is clear and moist. No oropharyngeal exudate.   Eyes: Conjunctivae and EOM are normal. Pupils are equal, round, and reactive to light. Right eye exhibits no discharge. No scleral icterus.   Neck: Normal range of motion and full passive range of motion without pain. Neck supple. No spinous process tenderness and no muscular tenderness present. Carotid bruit is not present. No thyromegaly present.   Cardiovascular: Normal rate, regular rhythm, S1 normal, S2 normal, normal heart sounds and intact distal pulses.  Exam reveals no gallop and no friction rub.    No murmur heard.  Pulmonary/Chest: Effort normal and breath sounds normal. No respiratory distress. She has no wheezes. She has no rales. She exhibits no tenderness.   Abdominal: Soft. Bowel sounds are normal. She exhibits no distension and no mass. There is no tenderness. There is no rebound and no guarding.   Genitourinary: Pelvic exam was performed with patient supine. Uterus is not deviated, not enlarged, not fixed and not tender. Cervix exhibits no motion tenderness, no discharge and no friability. Right adnexum displays no mass, no tenderness and no fullness. Left adnexum displays no mass, no tenderness and no fullness.   Musculoskeletal: Normal range of motion. She exhibits no edema or tenderness.   Lymphadenopathy:        Head (right side): No submental and no submandibular adenopathy present.        Head (left side): No submental and no submandibular adenopathy present.     She has no cervical adenopathy.        Right cervical: No superficial cervical, no deep cervical and no posterior cervical adenopathy  present.       Left cervical: No superficial cervical, no deep cervical and no posterior cervical adenopathy present.        Right axillary: No pectoral and no lateral adenopathy present.        Left axillary: No pectoral and no lateral adenopathy present.       Right: No supraclavicular adenopathy present.        Left: No supraclavicular adenopathy present.   Neurological: She is alert and oriented to person, place, and time. She has normal reflexes. No cranial nerve deficit. She exhibits normal muscle tone. Coordination normal.   Skin: Skin is warm and dry. No rash noted.   Psychiatric: She has a normal mood and affect. Her behavior is normal. Her mood appears not anxious. Her speech is not rapid and/or pressured. She is not agitated. She does not exhibit a depressed mood.   Normal behavior, thought content, insight and judgement.   Nursing note and vitals reviewed.      Assessment:       1. Annual physical exam    2. Amnestic MCI (mild cognitive impairment with memory loss)    3. H/O: stroke,     4. Malignant neoplasm of upper-outer quadrant of left female breast, unspecified estrogen receptor status    5. S/P coronary artery stent placement,     6. Essential hypertension    7. Paroxysmal atrial fibrillation    8. Anticoagulant long-term use, apixiban    9. Breast cancer screening        Plan:   Theresa was seen today for annual exam, leg pain, neck pain, back pain and hand pain.    Diagnoses and all orders for this visit:    Annual physical exam    Amnestic MCI (mild cognitive impairment with memory loss)    H/O: stroke,     Malignant neoplasm of upper-outer quadrant of left female breast, unspecified estrogen receptor status    S/P coronary artery stent placement,     Essential hypertension    Paroxysmal atrial fibrillation    Anticoagulant long-term use, apixiban    Breast cancer screening  -     Mammo Digital Screening Bilat with Tomosynthesis CAD; Future    Other orders  -      methylcellulose (CITRUCEL, SUCROSE,) oral powder; Take 3.4 g by mouth once daily.  -     acetaminophen (TYLENOL) 325 MG tablet; One or two tablets every 6 hours as needed for pain      Medication List with Changes/Refills   New Medications    ACETAMINOPHEN (TYLENOL) 325 MG TABLET    One or two tablets every 6 hours as needed for pain    METHYLCELLULOSE (CITRUCEL, SUCROSE,) ORAL POWDER    Take 3.4 g by mouth once daily.   Current Medications    APIXABAN (ELIQUIS) 5 MG TAB    Take 5 mg by mouth 2 (two) times daily.    ASPIRIN (ASPIRIN LOW DOSE) 81 MG EC TABLET    2 Tablet, Delayed Release (E.C.) Oral At bedtime    CETIRIZINE (ZYRTEC) 10 MG TABLET    Take 10 mg by mouth once daily.    CLOBETASOL (TEMOVATE) 0.05 % EXTERNAL SOLUTION    Apply topically. .  AAA scalp qd - bid    MOMETASONE (ELOCON) 0.1 % CREAM    Apply 1 application topically Use as needed.   face    PRAVASTATIN (PRAVACHOL) 20 MG TABLET    pravastatin 20 mg tablet   Take 1 tablet every day by oral route.    PROPAFENONE (RHTHYMOL) 150 MG TAB    Take 150 mg by mouth every 8 (eight) hours.    SILVER SULFADIAZINE 1% (SILVADENE) 1 % CREAM    Apply topically 3 (three) times a week. Use dime-sized amount with finger 3 times/week.    TRAMADOL (ULTRAM) 50 MG TABLET    Take 50 mg by mouth every 12 (twelve) hours as needed for Pain.    TRIAMCINOLONE ACETONIDE 0.1% (KENALOG) 0.1 % CREAM    Apply topically Twice daily.   disp:  60g tube AAA     Discontinued Medications    CEPHALEXIN (KEFLEX) 250 MG CAPSULE    Take 1 capsule (250 mg total) by mouth once daily.    DONEPEZIL (ARICEPT) 5 MG TABLET    TAKE 1 TABLET AT BEDTIME FOR MEMORY    LISINOPRIL (PRINIVIL,ZESTRIL) 20 MG TABLET    lisinopril 20 mg tablet   Take 1 tablet every day by oral route.    PRAVASTATIN (PRAVACHOL) 20 MG TABLET    Take by mouth.       Results for orders placed or performed in visit on 06/20/18   POCT URINE DIPSTICK WITHOUT MICROSCOPE   Result Value Ref Range    Color, UA clear yellow     Spec Grav  UA 1.005     pH, UA 6     WBC, UA neg     Nitrite, UA neg     Protein trace     Glucose, UA normal     Ketones, UA neg     Urobilinogen, UA normal     Bilirubin neg     Blood, UA neg        Follow-up in about 6 months (around 1/24/2019) for after Mammo and one year for PE.  Hopefully Tylenol and glucosamine chondroitin will satisfactorily control her joint pains have that she is not taking ibuprofen or Aleve.

## 2018-07-31 ENCOUNTER — EXTERNAL CHRONIC CARE MANAGEMENT (OUTPATIENT)
Dept: PRIMARY CARE CLINIC | Facility: CLINIC | Age: 78
End: 2018-07-31
Payer: MEDICARE

## 2018-07-31 PROCEDURE — 99490 CHRNC CARE MGMT STAFF 1ST 20: CPT | Mod: PBBFAC | Performed by: INTERNAL MEDICINE

## 2018-07-31 PROCEDURE — 99490 CHRNC CARE MGMT STAFF 1ST 20: CPT | Mod: S$PBB,,, | Performed by: INTERNAL MEDICINE

## 2018-08-08 ENCOUNTER — TELEPHONE (OUTPATIENT)
Dept: PODIATRY | Facility: CLINIC | Age: 78
End: 2018-08-08

## 2018-08-08 NOTE — TELEPHONE ENCOUNTER
----- Message from Myra Ford sent at 8/8/2018 10:41 AM CDT -----  Contact: self  Pt needs an appt for ingrown toe nail on right foot pt stated she will be in town on 8/27 and wanted to know if she can be seen that day pt can be reached @ Home#

## 2018-10-15 ENCOUNTER — TELEPHONE (OUTPATIENT)
Dept: UROGYNECOLOGY | Facility: CLINIC | Age: 78
End: 2018-10-15

## 2018-10-15 DIAGNOSIS — N39.0 URINARY TRACT INFECTION WITHOUT HEMATURIA, SITE UNSPECIFIED: Primary | ICD-10-CM

## 2018-10-15 NOTE — TELEPHONE ENCOUNTER
----- Message from Yulisa Haskins sent at 10/15/2018  2:52 PM CDT -----  Contact: Pt   Name of Who is Calling: NANCY BAGLEY [[id number]]    What is the request in detail: Pt is returning Az's call, please advise.    Can the clinic reply by [facility]: No     What Number to Call Back if not in [facility]: [phone]

## 2018-10-15 NOTE — TELEPHONE ENCOUNTER
----- Message from Yanira Raman sent at 10/15/2018 12:40 PM CDT -----  Contact: pt            Name of Who is Calling: Theresa      What is the request in detail: requesting an Rx for UTI be sent to her pharmacy. [facility] - [place], [place] - [address] [phone] (Phone)  [phone] (Fax)        Can the clinic reply by [facility]:no       What [facility] Back [facility]:[phone]

## 2018-10-15 NOTE — TELEPHONE ENCOUNTER
----- Message from Yulisa Haskins sent at 10/15/2018  2:52 PM CDT -----  Contact: Pt   Name of Who is Calling: NANCY BAGLEY [[id number]]    What is the request in detail: Pt is returning Az's call, please advise.    Can the clinic reply by [facility]: No     What Number to Call Back if not in [facility]: [phone]

## 2018-10-15 NOTE — TELEPHONE ENCOUNTER
Spoke to pt regarding request for RX. She states she is having a minor procedure on tomorrow and was placed on antibiotics x1 week prior to her procedure, she states every time she takes certain antibiotics, diarrhea results, then UTI.     Pt states she will drop off a urine specimen the [facility] in [place].   Orders placed.

## 2018-10-15 NOTE — TELEPHONE ENCOUNTER
Spoke to pt regarding request for RX. She states she is having a minor procedure on tomorrow and was placed on antibiotics x1 week prior to her procedure, she states every time she takes certain antibiotics, diarrhea results, then UTI.      Pt states she will drop off a urine specimen the [facility] in [place].   Orders placed.

## 2018-10-16 ENCOUNTER — LAB VISIT (OUTPATIENT)
Dept: LAB | Facility: HOSPITAL | Age: 78
End: 2018-10-16
Attending: NURSE PRACTITIONER
Payer: MEDICARE

## 2018-10-16 DIAGNOSIS — N39.0 URINARY TRACT INFECTION WITHOUT HEMATURIA, SITE UNSPECIFIED: ICD-10-CM

## 2018-10-16 LAB
BACTERIA #/AREA URNS HPF: ABNORMAL /HPF
BILIRUB UR QL STRIP: NEGATIVE
CLARITY UR: ABNORMAL
COLOR UR: YELLOW
GLUCOSE UR QL STRIP: NEGATIVE
HGB UR QL STRIP: ABNORMAL
HYALINE CASTS #/AREA URNS LPF: 0 /LPF
KETONES UR QL STRIP: ABNORMAL
LEUKOCYTE ESTERASE UR QL STRIP: ABNORMAL
MICROSCOPIC COMMENT: ABNORMAL
NITRITE UR QL STRIP: POSITIVE
PH UR STRIP: 6 [PH] (ref 5–8)
PROT UR QL STRIP: ABNORMAL
RBC #/AREA URNS HPF: 10 /HPF (ref 0–4)
SP GR UR STRIP: >=1.03 (ref 1–1.03)
SQUAMOUS #/AREA URNS HPF: 3 /HPF
URN SPEC COLLECT METH UR: ABNORMAL
UROBILINOGEN UR STRIP-ACNC: NEGATIVE EU/DL
WBC #/AREA URNS HPF: >100 /HPF (ref 0–5)

## 2018-10-16 PROCEDURE — 87077 CULTURE AEROBIC IDENTIFY: CPT

## 2018-10-16 PROCEDURE — 87086 URINE CULTURE/COLONY COUNT: CPT

## 2018-10-16 PROCEDURE — 81000 URINALYSIS NONAUTO W/SCOPE: CPT

## 2018-10-16 PROCEDURE — 87088 URINE BACTERIA CULTURE: CPT

## 2018-10-16 PROCEDURE — 87186 SC STD MICRODIL/AGAR DIL: CPT

## 2018-10-17 ENCOUNTER — TELEPHONE (OUTPATIENT)
Dept: UROGYNECOLOGY | Facility: CLINIC | Age: 78
End: 2018-10-17

## 2018-10-17 NOTE — TELEPHONE ENCOUNTER
Spoke to pt, she was informed that Keflex 500mg,#10, 1 PO BID X5 DAYS, was called into [facility] in her chart. Pt verbalizes understanding.

## 2018-10-17 NOTE — TELEPHONE ENCOUNTER
----- Message from Ladi Harley sent at 10/17/2018  2:09 PM CDT -----  Contact: self  Patient wants to know if you received the fax from [facility]. She has a UTI and needs medicine asap please. Patient would like a call back [phone]. Needs it sent to [facility] [phone] and fax [phone]

## 2018-10-19 LAB — BACTERIA UR CULT: NORMAL

## 2018-10-22 ENCOUNTER — TELEPHONE (OUTPATIENT)
Dept: UROGYNECOLOGY | Facility: CLINIC | Age: 78
End: 2018-10-22

## 2018-10-22 NOTE — TELEPHONE ENCOUNTER
----- Message from Reyna Wheeler MD sent at 10/19/2018  8:40 PM CDT -----  Please let the patient know that the keflex she was prescribed for UTI should have worked according to sensitivities.  She should let us know if she does not feel better after completing it.  Also, she should continue to control bowel movements and use vaginal cream in effort to reduce UTI frequency.  If she has loose stool episodes, she may want to start using fragrance-free baby wipes to really clean well or take a short shower to clean well.

## 2018-10-22 NOTE — TELEPHONE ENCOUNTER
Attempted to contact pt to inform her that the keflex she was prescribed for UTI should have worked according to sensitivities.  She should let us know if she does not feel better after completing it.  Also, she should continue to control bowel movements and use vaginal cream in effort to reduce UTI frequency.  If she has loose stool episodes, she may want to start using fragrance-free baby wipes to really clean well or take a short shower to clean well. Pt did not answer. LM to call office

## 2018-11-30 ENCOUNTER — TELEPHONE (OUTPATIENT)
Dept: UROGYNECOLOGY | Facility: CLINIC | Age: 78
End: 2018-11-30

## 2018-11-30 NOTE — TELEPHONE ENCOUNTER
Spoke with pt. Who stated she has had urine infections, at the moment she is better, did take her antibiotics, and was instructed to wipe from the front to the back, which she said she was doing the opposite, will get the fragrance free wipes, pt. Stated she does not drink water, but drinks other stuff.  Pt. went to an urgent care center close to home and no UTI,  they evalulated her for a cyst in her vaginal area. And was given more antibiotics, but right now does not feel a UTI, but still wanted an appt. To be seen.  Appt. Made for feb. 26, 2019, that's what she wanted , because she still works.  Earlier appt. Offered she said NO. Pt. Refuses to see NP.

## 2018-12-31 ENCOUNTER — EXTERNAL CHRONIC CARE MANAGEMENT (OUTPATIENT)
Dept: PRIMARY CARE CLINIC | Facility: CLINIC | Age: 78
End: 2018-12-31
Payer: MEDICARE

## 2018-12-31 PROCEDURE — 99490 PR CHRONIC CARE MGMT, 1ST 20 MIN: ICD-10-PCS | Mod: S$PBB,,, | Performed by: INTERNAL MEDICINE

## 2018-12-31 PROCEDURE — 99490 CHRNC CARE MGMT STAFF 1ST 20: CPT | Mod: S$PBB,,, | Performed by: INTERNAL MEDICINE

## 2018-12-31 PROCEDURE — 99490 CHRNC CARE MGMT STAFF 1ST 20: CPT | Mod: PBBFAC | Performed by: INTERNAL MEDICINE

## 2019-01-21 ENCOUNTER — HOSPITAL ENCOUNTER (OUTPATIENT)
Dept: RADIOLOGY | Facility: HOSPITAL | Age: 79
Discharge: HOME | End: 2019-01-21
Attending: INTERNAL MEDICINE
Payer: MEDICARE

## 2019-01-21 VITALS — BODY MASS INDEX: 25.5 KG/M2 | WEIGHT: 158 LBS

## 2019-01-21 DIAGNOSIS — Z12.39 BREAST CANCER SCREENING: ICD-10-CM

## 2019-01-21 PROCEDURE — 77067 SCR MAMMO BI INCL CAD: CPT | Mod: 26,,, | Performed by: RADIOLOGY

## 2019-01-21 PROCEDURE — 77063 MAMMO DIGITAL SCREENING BILAT WITH TOMOSYNTHESIS_CAD: ICD-10-PCS | Mod: 26,,, | Performed by: RADIOLOGY

## 2019-01-21 PROCEDURE — 77063 BREAST TOMOSYNTHESIS BI: CPT | Mod: 26,,, | Performed by: RADIOLOGY

## 2019-01-21 PROCEDURE — 77067 MAMMO DIGITAL SCREENING BILAT WITH TOMOSYNTHESIS_CAD: ICD-10-PCS | Mod: 26,,, | Performed by: RADIOLOGY

## 2019-01-21 PROCEDURE — 77067 SCR MAMMO BI INCL CAD: CPT | Mod: TC

## 2019-01-31 ENCOUNTER — EXTERNAL CHRONIC CARE MANAGEMENT (OUTPATIENT)
Dept: PRIMARY CARE CLINIC | Facility: CLINIC | Age: 79
End: 2019-01-31
Payer: MEDICARE

## 2019-01-31 PROCEDURE — 99490 CHRNC CARE MGMT STAFF 1ST 20: CPT | Mod: S$PBB,,, | Performed by: INTERNAL MEDICINE

## 2019-01-31 PROCEDURE — 99490 CHRNC CARE MGMT STAFF 1ST 20: CPT | Mod: PBBFAC | Performed by: INTERNAL MEDICINE

## 2019-01-31 PROCEDURE — 99490 PR CHRONIC CARE MGMT, 1ST 20 MIN: ICD-10-PCS | Mod: S$PBB,,, | Performed by: INTERNAL MEDICINE

## 2019-02-15 ENCOUNTER — TELEPHONE (OUTPATIENT)
Dept: UROGYNECOLOGY | Facility: CLINIC | Age: 79
End: 2019-02-15

## 2019-02-15 NOTE — TELEPHONE ENCOUNTER
----- Message from Ovi Carroll sent at 2/15/2019 10:40 AM CST -----  Contact: NANCY BAGLEY [[id number]]  Name of Who is Calling:NANCY BAGLEY [[id number]]      What is the request in detail: Pt requesting orders for urinalysis; possible UTI. Contact pt at your earliest convenience.  Thanks-          Can the clinic reply by [facility]: N    What Number to Call Back if not in [facility]: [phone]

## 2019-02-15 NOTE — TELEPHONE ENCOUNTER
Attempted to return pt's call to discuss UTI symptoms no answer left message for her to contact the office.

## 2019-02-26 ENCOUNTER — OFFICE VISIT (OUTPATIENT)
Dept: OPTOMETRY | Facility: CLINIC | Age: 79
End: 2019-02-26
Payer: MEDICARE

## 2019-02-26 ENCOUNTER — OFFICE VISIT (OUTPATIENT)
Dept: INTERNAL MEDICINE | Facility: CLINIC | Age: 79
End: 2019-02-26
Payer: MEDICARE

## 2019-02-26 ENCOUNTER — OFFICE VISIT (OUTPATIENT)
Dept: UROGYNECOLOGY | Facility: CLINIC | Age: 79
End: 2019-02-26
Payer: MEDICARE

## 2019-02-26 VITALS
BODY MASS INDEX: 25.94 KG/M2 | SYSTOLIC BLOOD PRESSURE: 110 MMHG | WEIGHT: 161.38 LBS | HEART RATE: 72 BPM | OXYGEN SATURATION: 98 % | DIASTOLIC BLOOD PRESSURE: 68 MMHG | HEIGHT: 66 IN

## 2019-02-26 VITALS
WEIGHT: 158 LBS | DIASTOLIC BLOOD PRESSURE: 60 MMHG | BODY MASS INDEX: 25.39 KG/M2 | HEIGHT: 66 IN | SYSTOLIC BLOOD PRESSURE: 110 MMHG

## 2019-02-26 DIAGNOSIS — Z85.3 PERSONAL HISTORY OF BREAST CANCER: ICD-10-CM

## 2019-02-26 DIAGNOSIS — R19.8 IRREGULAR BOWEL HABITS: ICD-10-CM

## 2019-02-26 DIAGNOSIS — H52.13 MYOPIA WITH ASTIGMATISM AND PRESBYOPIA, BILATERAL: ICD-10-CM

## 2019-02-26 DIAGNOSIS — Z86.73 OLD EMBOLIC STROKE WITHOUT LATE EFFECT: ICD-10-CM

## 2019-02-26 DIAGNOSIS — Z86.73 H/O: STROKE: ICD-10-CM

## 2019-02-26 DIAGNOSIS — I25.118 CORONARY ARTERY DISEASE OF NATIVE ARTERY OF NATIVE HEART WITH STABLE ANGINA PECTORIS: ICD-10-CM

## 2019-02-26 DIAGNOSIS — N39.0 FREQUENT UTI: Primary | ICD-10-CM

## 2019-02-26 DIAGNOSIS — N39.46 MIXED STRESS AND URGE URINARY INCONTINENCE: ICD-10-CM

## 2019-02-26 DIAGNOSIS — R35.1 NOCTURIA: ICD-10-CM

## 2019-02-26 DIAGNOSIS — Z90.710 S/P LAPAROSCOPIC ASSISTED VAGINAL HYSTERECTOMY (LAVH): ICD-10-CM

## 2019-02-26 DIAGNOSIS — H18.529 ABMD (ANTERIOR BASEMENT MEMBRANE DYSTROPHY): ICD-10-CM

## 2019-02-26 DIAGNOSIS — G89.29 CHRONIC BILATERAL LOW BACK PAIN WITHOUT SCIATICA: ICD-10-CM

## 2019-02-26 DIAGNOSIS — G31.84 AMNESTIC MCI (MILD COGNITIVE IMPAIRMENT WITH MEMORY LOSS): ICD-10-CM

## 2019-02-26 DIAGNOSIS — N81.89 PELVIC FLOOR WEAKNESS: ICD-10-CM

## 2019-02-26 DIAGNOSIS — H52.203 MYOPIA WITH ASTIGMATISM AND PRESBYOPIA, BILATERAL: ICD-10-CM

## 2019-02-26 DIAGNOSIS — H52.4 MYOPIA WITH ASTIGMATISM AND PRESBYOPIA, BILATERAL: ICD-10-CM

## 2019-02-26 DIAGNOSIS — R20.8 BURNING SENSATION OF FEET: Primary | ICD-10-CM

## 2019-02-26 DIAGNOSIS — Z79.01 ANTICOAGULANT LONG-TERM USE: ICD-10-CM

## 2019-02-26 DIAGNOSIS — K59.09 CONSTIPATION, CHRONIC: ICD-10-CM

## 2019-02-26 DIAGNOSIS — Z95.5 S/P CORONARY ARTERY STENT PLACEMENT: ICD-10-CM

## 2019-02-26 DIAGNOSIS — H18.601 KERATOCONUS OF RIGHT EYE: Primary | ICD-10-CM

## 2019-02-26 DIAGNOSIS — I48.0 PAROXYSMAL ATRIAL FIBRILLATION: ICD-10-CM

## 2019-02-26 DIAGNOSIS — M54.50 CHRONIC BILATERAL LOW BACK PAIN WITHOUT SCIATICA: ICD-10-CM

## 2019-02-26 DIAGNOSIS — N95.2 ATROPHIC VAGINITIS: ICD-10-CM

## 2019-02-26 DIAGNOSIS — Z95.820 S/P ANGIOPLASTY WITH STENT: ICD-10-CM

## 2019-02-26 DIAGNOSIS — Z96.1 PSEUDOPHAKIA OF BOTH EYES: ICD-10-CM

## 2019-02-26 DIAGNOSIS — H04.123 BILATERAL DRY EYES: ICD-10-CM

## 2019-02-26 PROBLEM — I25.10 CORONARY ARTERY DISEASE INVOLVING NATIVE CORONARY ARTERY OF NATIVE HEART: Status: ACTIVE | Noted: 2019-02-26

## 2019-02-26 PROCEDURE — 99999 PR PBB SHADOW E&M-EST. PATIENT-LVL III: ICD-10-PCS | Mod: PBBFAC,,, | Performed by: OBSTETRICS & GYNECOLOGY

## 2019-02-26 PROCEDURE — 99999 PR PBB SHADOW E&M-EST. PATIENT-LVL III: CPT | Mod: PBBFAC,,, | Performed by: OBSTETRICS & GYNECOLOGY

## 2019-02-26 PROCEDURE — 99999 PR PBB SHADOW E&M-EST. PATIENT-LVL III: CPT | Mod: PBBFAC,,, | Performed by: INTERNAL MEDICINE

## 2019-02-26 PROCEDURE — 99213 OFFICE O/P EST LOW 20 MIN: CPT | Mod: PBBFAC,25 | Performed by: OBSTETRICS & GYNECOLOGY

## 2019-02-26 PROCEDURE — 99214 OFFICE O/P EST MOD 30 MIN: CPT | Mod: S$PBB,,, | Performed by: INTERNAL MEDICINE

## 2019-02-26 PROCEDURE — 92015 PR REFRACTION: ICD-10-PCS | Mod: ,,, | Performed by: OPTOMETRIST

## 2019-02-26 PROCEDURE — 99999 PR PBB SHADOW E&M-EST. PATIENT-LVL III: CPT | Mod: PBBFAC,,, | Performed by: OPTOMETRIST

## 2019-02-26 PROCEDURE — 99214 PR OFFICE/OUTPT VISIT, EST, LEVL IV, 30-39 MIN: ICD-10-PCS | Mod: S$PBB,,, | Performed by: OBSTETRICS & GYNECOLOGY

## 2019-02-26 PROCEDURE — 99999 PR PBB SHADOW E&M-EST. PATIENT-LVL III: ICD-10-PCS | Mod: PBBFAC,,, | Performed by: INTERNAL MEDICINE

## 2019-02-26 PROCEDURE — 99999 PR PBB SHADOW E&M-EST. PATIENT-LVL III: ICD-10-PCS | Mod: PBBFAC,,, | Performed by: OPTOMETRIST

## 2019-02-26 PROCEDURE — 92015 DETERMINE REFRACTIVE STATE: CPT | Mod: ,,, | Performed by: OPTOMETRIST

## 2019-02-26 PROCEDURE — 92014 PR EYE EXAM, EST PATIENT,COMPREHESV: ICD-10-PCS | Mod: S$PBB,,, | Performed by: OPTOMETRIST

## 2019-02-26 PROCEDURE — 99214 OFFICE O/P EST MOD 30 MIN: CPT | Mod: S$PBB,,, | Performed by: OBSTETRICS & GYNECOLOGY

## 2019-02-26 PROCEDURE — 99214 PR OFFICE/OUTPT VISIT, EST, LEVL IV, 30-39 MIN: ICD-10-PCS | Mod: S$PBB,,, | Performed by: INTERNAL MEDICINE

## 2019-02-26 PROCEDURE — 92014 COMPRE OPH EXAM EST PT 1/>: CPT | Mod: S$PBB,,, | Performed by: OPTOMETRIST

## 2019-02-26 PROCEDURE — 99213 OFFICE O/P EST LOW 20 MIN: CPT | Mod: PBBFAC,27 | Performed by: OPTOMETRIST

## 2019-02-26 PROCEDURE — 99213 OFFICE O/P EST LOW 20 MIN: CPT | Mod: PBBFAC,27 | Performed by: INTERNAL MEDICINE

## 2019-02-26 RX ORDER — CLOPIDOGREL BISULFATE 75 MG/1
75 TABLET ORAL
COMMUNITY
End: 2019-02-26

## 2019-02-26 RX ORDER — GABAPENTIN 100 MG/1
100 CAPSULE ORAL 3 TIMES DAILY PRN
Qty: 90 CAPSULE | Refills: 11 | Status: SHIPPED | OUTPATIENT
Start: 2019-02-26 | End: 2020-09-29 | Stop reason: DRUGHIGH

## 2019-02-26 RX ORDER — CEPHALEXIN 250 MG/1
250 CAPSULE ORAL DAILY
Qty: 60 CAPSULE | Refills: 3 | Status: SHIPPED | OUTPATIENT
Start: 2019-02-26 | End: 2019-11-13 | Stop reason: SDUPTHER

## 2019-02-26 RX ORDER — ASPIRIN 81 MG/1
162 TABLET ORAL
COMMUNITY
End: 2020-06-29 | Stop reason: SDUPTHER

## 2019-02-26 RX ORDER — SILVER SULFADIAZINE 10 G/1000G
CREAM TOPICAL
Qty: 50 G | Refills: 6 | Status: SHIPPED | OUTPATIENT
Start: 2019-02-27 | End: 2020-06-29

## 2019-02-26 NOTE — PATIENT INSTRUCTIONS
1. Frequent UTI  --start daily Keflex 250 mg for now; goal to wean off in a few months; get bowel movements and vaginal dryness better, as these are major contributors to frequent UTIs  --get bowel movements better controlled  --treat vaginal dryness:  Restart silvadene -- dime size amount 3x weekly  --CT A/P 5/28/18: neg  findings  --cysto 6/18 normal     2.  Mixed urinary incontinence, urge > stress:  (not bothersome at this time)  --urine C&S sent today; if feel have UTI, call us so we can evaluate  --Empty bladder every 3 hours.  Empty well: wait a minute, lean forward on toilet.    --Avoid dietary irritants (see sheet).  Keep diary x 3-5 days to determine your irritants.  --KEGELS: do 10 in AM and 10 in PM, holding each x 10 seconds.  When you feel urge to go, STOP, KEGEL, and when urge has passed, then go to bathroom.    --URGE: consider anticholinergic.  Takes 2-4 weeks to see if will have effect.  For dry mouth: get sour, sugar free lozenge or gum.    --STRESS:  Pessary vs. Sling.      3.  Vaginal atrophy (dryness): restart silvadene -- dime size amount 3x weekly; use glove to prevent scratches with nails  --this can reduce UTIs     4.  Irregular bowel habits/ abdominal bloating        --hydrate well (2-3 extra glasses a day)  --Continue fiber gummies (1 in Am and 1 in PM)/high fiber diet.   --start magnesium oxide 400 mg daily  --take 1 stool softener daily  --continue probiotic daily  --when you take tramadol or any narcotic, also take 1 stool softener (EX: ducosate sodium); GOAL: try to minimize tramadol and narcotic use  --consider amitiza or linzess if can't get BMs better  --consider referral to GI     5.  Lumbar pain:  --sounds MSK in etiology  --start PT:  [facility] to make appt: Phone number [phone].  Will send Rx.   --if you can get back pain better, you will use pain meds less, and be constipated less; which could reduce impact on UTIs  --talk with PCP about other meds  (non-narcotic) you can take for back pain: ex gapapentin, amitriptyline     6.  RTC 3 months.

## 2019-02-26 NOTE — PATIENT INSTRUCTIONS
"DRY EYES     Dry eyes is a common condition. It can be caused by an insufficient volume of tears, or by tears that do not spread evenly over the cornea. An uneven tear film can also cause vision to blur intermittently.   Dry eyes are often associated with burning, stinging, and/or red eyes.As we age, the eyes usually produce fewer tears and result in decreased normal eye lubrication. Paradoxically, dry eye can make eyes water. When they water, that watery production actually makes the dry eye situation worse because the watery tears do not lubricate the eye well at all. Watery tears really serve to flush foreign material out of the eye, not to lubricate. Unfortunately, when the eyes get really dry they become irritated and stimulate the formation of watery tears.   Lubricants, in the form of drops or ointments, are the principal treatment for dry eyes. The following are recommendations for managing your dry eye condition:    1) Use over-the-counter artificial tears or lubricants (such as Systane Balance, Soothe XP, Refresh Optive, Blink, or Genteal), 1 drop in each eye 3 to 4 times a day. Please avoid drops that advertise redness relief since these can be unhealthy for your eyes and can cause permanent redness if used long-term.     It is best to take artificial tears on a schedule, like you would for a medication. Taking them routinely at breakfast, lunch, and dinner for example will provide better relief and better use of the tear drop than taking them whenever your eyes "feel" dry.    2) Optional use of over-the-counter lubricating gels (such as Genteal Gel, Systane Gel, or Refresh PM) applied to the inside of the lower lid of both eyes at bedtime. This gel is very thick and may cause blurred vision, so we recommend you use it before bedtime. In the morning you can gently wipe your eyes with a damp washcloth to remove any residual gel.    3) Warm compresses applied to the closed eyelids twice per day, 10 minutes " each time.    4) Always drink an adequate amount of water daily (4-6 cups a day).    5) 1000 mg of good quality fish oil (labeled DHA and/or EPA). Flaxseed oil can also be beneficial. Do not take fish oil if you are currently taking a medication called warfarin or coumadin.    6) Use a humidifier in your bedroom.    7) If the dryness continues there may be additional options of punctal plugs, or prescription dry eye drops such as Restasis or Xiidra. Punctal plugs are medical devices inserted into the puncta or the drain of the eye to block some of your natural tears from draining off the eye. Restasis and Xiidra are prescription eye drops that, over time, may help your body make more tears naturally.    It is important to maintain this treatment plan until your symptoms have improved. Once improved, you can reduce the frequency of lubricants and warm compresses. If the symptoms recur, then apply as needed for comfort.

## 2019-02-26 NOTE — PROGRESS NOTES
Urogyn follow up  02/26/2019    [facility]  [address]  [facility] [id number]    Theresa Hook  [id number]  1940      Theresa Hook is a 78 y.o. here for a urogyn follow up.    History since last visit: Report vaginal bleeding a 1-2 weeks ago when she wiped after voiding. Ran out of silvadene.    Bladder issues: Denies UI, urinary urgency, or frequency. No current dysuria.  Nocturia 2-3 times/ night. Was treated for UTI.  Bowel issues: +new low abd swelling/gas; +hemorrhoid; +Constipation intermittent with loose stool; taking gummy fibers 2 in AM and 1 at PM--helps mostly but has had increased problems x last month (intermittent C/D with fecal urgency/close calls).  Takes tramadol PRN for foot pain--has been using more x last month; stopped tylenol codeine.  Not taking stool softener.  Did not set up CRS consult yet.   She has had a CVA in 06/2016 and had cardiac stent placed 11/2016.  Frequent UTIs: typical symptoms: urgency, pain with urination, increased nocturia; has had 3 in last few months. 10/17 e coli.  4/10/18 C&S neg.  4/24/18 C&S + e coli. Did not get Rx x 1 week. Is upset about this.   Had 3 UTIs documented in 2017.                --has had increased urinary frequency/small amounts; feels emptying well; can have small volume voids; PV PPx to help              --is finishing course of Keflex (10 days).      Issues include:  Patient Active Problem List   Diagnosis    HTN (hypertension)    Interval gout    Breast cancer, 2005, left breast lumpectomy x 2, lymph nodes negative, XRT, tamoxifem 5 years.    Skin cancer, 2005 squamous cell right nasolabial fold.   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    Need for Tdap vaccination    Need for  vaccination with 13-polyvalent pneumococcal conjugate vaccine    Paroxysmal atrial fibrillation    Old embolic stroke without late effect, assoc a fib     S/P coronary artery stent placement,     Amnestic MCI (mild cognitive impairment with memory loss)    H/O: stroke,     Gait instability    Family history of Charcot foot disease    Encounter for screening mammogram for malignant neoplasm of breast    Constipation, chronic    Frequent UTI    Irregular bowel habits    Anticoagulant long-term use, apixiban       History since last visit:     1. frequent UTI  --continues to have UTIs every month; stopped getting C&S due to inconvenience; would like an Rx for regular Tx vs other option  --taking daily probiotics  --continues to have constipation:  taking daily fiber gummies + high fiber   --continue silvadene -- dime size amount twice weekly  --CT A/P 5/28/18: neg  findings  --cysto 6/2018: normal     2.  Mixed urinary incontinence, urge > stress:  (not bothersome at this time)  --urine C&S sent today; if feel have UTI, call us so we can evaluate  --Empty bladder every 3 hours.  Empty well: wait a minute, lean forward on toilet.    --Avoid dietary irritants (see sheet).  Keep diary x 3-5 days to determine your irritants.  --KEGELS: do 10 in AM and 10 in PM, holding each x 10 seconds.  When you feel urge to go, STOP, KEGEL, and when urge has passed, then go to bathroom.  --URGE: consider anticholinergic.  Takes 2-4 weeks to see if will have effect.  For dry mouth: get sour, sugar free lozenge or gum.    --STRESS:  Pessary vs. Sling.      3.  Vaginal atrophy (dryness): stooped silvadene -- dime size amount twice weekly     4.  Irregular bowel habits/ abdominal bloating  --Continues fiber gummies (1 in Am and 1 in PM)/high fiber diet.    --still having hard stool  --taking tramadol or any narcotic, also take 1 stool softener (EX: ducosate sodium)  --hydrate well (2-3 extra glasses a  "day)  --referral to GI     5.  Lumbar pain:  --sounds MSK in etiology  --did not start PT in [facility]  --takes tyelnol/codeine PRN; tramadol doesn't work as much      Medications:    Current Outpatient Medications:     apixaban (ELIQUIS) 5 mg Tab, Take 5 mg by mouth 2 (two) times daily., Disp: , Rfl:     aspirin (ASPIRIN LOW DOSE) 81 MG EC tablet, 2 Tablet, Delayed Release (E.C.) Oral At bedtime, Disp: , Rfl:     aspirin (ECOTRIN) 81 MG EC tablet, Take 162 mg by mouth., Disp: , Rfl:     cetirizine (ZYRTEC) 10 MG tablet, Take 10 mg by mouth once daily., Disp: , Rfl:     clobetasol (TEMOVATE) 0.05 % external solution, Apply topically. .  AAA scalp qd - bid, Disp: , Rfl:     mometasone (ELOCON) 0.1 % cream, Apply 1 application topically Use as needed.   face, Disp: , Rfl:     pravastatin (PRAVACHOL) 20 MG tablet, pravastatin 20 mg tablet  Take 1 tablet every day by oral route., Disp: , Rfl:     propafenone (RHTHYMOL) 150 MG Tab, Take 150 mg by mouth every 8 (eight) hours., Disp: , Rfl:     traMADol (ULTRAM) 50 mg tablet, Take 50 mg by mouth every 12 (twelve) hours as needed for Pain., Disp: , Rfl:     triamcinolone acetonide 0.1% (KENALOG) 0.1 % cream, Apply topically Twice daily.   disp:  60g tube AAA  , Disp: , Rfl:     acetaminophen (TYLENOL) 325 MG tablet, One or two tablets every 6 hours as needed for pain, Disp: 100 tablet, Rfl: 0    cephALEXin (KEFLEX) 250 MG capsule, Take 1 capsule (250 mg total) by mouth once daily., Disp: 60 capsule, Rfl: 3    [START ON 2/27/2019] silver sulfADIAZINE 1% (SILVADENE) 1 % cream, Apply topically 3 (three) times a week. Use dime-sized amount with finger 3 times/week., Disp: 50 g, Rfl: 6    Current Facility-Administered Medications:     lidocaine HCl 2% urojet, , Mucous Membrane, 1 time in Clinic/HOD, Reyna Wheeler MD      ROS:  As per HPI.      Exam  /60   Ht 5' 6" (1.676 m)   Wt 71.7 kg (158 lb)   BMI 25.50 kg/m²   General: alert and oriented, no " acute distress  Remainder of PE deferred.     Impression  1. Frequent UTI  cephALEXin (KEFLEX) 250 MG capsule   2. Atrophic vaginitis  silver sulfADIAZINE 1% (SILVADENE) 1 % cream   3. Amnestic MCI (mild cognitive impairment with memory loss)     4. Mixed stress and urge urinary incontinence     5. Nocturia     6. Pelvic floor weakness     7. S/P laparoscopic assisted vaginal hysterectomy (LAVH)     8. Personal history of breast cancer     9. Constipation, chronic     10. Irregular bowel habits       We reviewed the above issues and discussed options for short-term versus long-term management of her problems.   Plan:     1. Frequent UTI  --start daily Keflex 250 mg for now; goal to wean off in a few months; get bowel movements and vaginal dryness better, as these are major contributors to frequent UTIs  --get bowel movements better controlled  --treat vaginal dryness:  Restart silvadene -- dime size amount 3x weekly  --CT A/P 5/28/18: neg  findings  --cysto 6/18 normal     2.  Mixed urinary incontinence, urge > stress:  (not bothersome at this time)  --urine C&S sent today; if feel have UTI, call us so we can evaluate  --Empty bladder every 3 hours.  Empty well: wait a minute, lean forward on toilet.    --Avoid dietary irritants (see sheet).  Keep diary x 3-5 days to determine your irritants.  --KEGELS: do 10 in AM and 10 in PM, holding each x 10 seconds.  When you feel urge to go, STOP, KEGEL, and when urge has passed, then go to bathroom.    --URGE: consider anticholinergic.  Takes 2-4 weeks to see if will have effect.  For dry mouth: get sour, sugar free lozenge or gum.    --STRESS:  Pessary vs. Sling.      3.  Vaginal atrophy (dryness): restart silvadene -- dime size amount 3x weekly; use glove to prevent scratches with nails  --this can reduce UTIs     4.  Irregular bowel habits/ abdominal bloating        --hydrate well (2-3 extra glasses a day)  --Continue fiber gummies (1 in Am and 1 in PM)/high fiber diet.    --start magnesium oxide 400 mg daily  --take 1 stool softener daily  --continue probiotic daily  --when you take tramadol or any narcotic, also take 1 stool softener (EX: ducosate sodium); GOAL: try to minimize tramadol and narcotic use  --consider amitiza or linzess if can't get BMs better  --consider referral to GI     5.  Lumbar pain:  --sounds MSK in etiology  --start PT:  Call [facility] to make appt: Phone number [phone].  Will send Rx.   --if you can get back pain better, you will use pain meds less, and be constipated less; which could reduce impact on UTIs  --talk with PCP about other meds (non-narcotic) you can take for back pain: ex gapapentin, amitriptyline     6.  RTC 3 months.     40 minutes were spent in face to face time with this patient  100 % of this time was spent in counseling and/or coordination of care     Reyna Wheeler MD  [facility]  Division of Female Pelvic Medicine and Reconstructive Surgery  Department of Obstetrics & Gynecology

## 2019-02-26 NOTE — PROGRESS NOTES
Subjective:       Patient ID: Theresa Hook is a 78 y.o. female.    Chief Complaint: Follow-up (6 month f/u)  This dictation was performed using using MModal.  Overall, she is feeling well and has good energy.  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  Her 13-year-old great granddaughter, Hermelinda  will be spending a month with her this summer and also working in the pharmacy.    She is happy with her cardiologist and sees her cardiologist on a regular basis.  She has had no stroke-like symptoms.  She has had no trouble with her memory.  She is taking and anti arrhythmic agent and is anticoagulated on apixaban, through her cardiologist.  She has not had any chest pain, palpitations, dyspnea on exertion, orthopnea or paroxysmal nocturnal dyspnea.    She does have a problem with chronic low back pain that is nonradiating and has been a problem for many years.  She occasionally takes a tramadol for this.    On and off she has had problems with tingling and burning in her feet.  In the past few months this has been a more severe and a  problem amrita. After working all day and she was taking tramadol for this.  Tramadol does not work  well.  She has had a problem with her ankles for many years and went to physical therapy for an ankle tendinitis which was beneficial.  HPI  Review of Systems    Objective:      Physical Exam    Assessment:       1. Burning sensation of feet    2. H/O: stroke,     3. Old embolic stroke without late effect, assoc a fib     4. Paroxysmal atrial fibrillation    5. Anticoagulant long-term use, apixiban    6. Coronary artery disease of native artery of native heart with stable angina pectoris    7. S/P angioplasty with stent,     8. Chronic bilateral low back pain without sciatica    9. S/P laparoscopic assisted vaginal hysterectomy (LAVH),     10. S/P coronary artery stent placement,     11. Personal history of breast cancer, 2005        Plan:    Theresa was seen today for follow-up.    Diagnoses and all orders for this visit:    Burning sensation of feet.  The plan will be to stop tramadol altogether.  She had been taking a tablet once every morning without much benefit.  We will try gabapentin on an as-needed basis starting with a 100 mg dose.  She could titrate this as needed for the relief of tingling and burning in her feet.    H/O: stroke,     Old embolic stroke without late effect, assoc a fib     Paroxysmal atrial fibrillation    Anticoagulant long-term use, apixiban    Coronary artery disease of native artery of native heart with stable angina pectoris    S/P angioplasty with stent,     Chronic bilateral low back pain without sciatica    S/P laparoscopic assisted vaginal hysterectomy (LAVH),     S/P coronary artery stent placement,     Personal history of breast cancer, 2005    Other orders  -     gabapentin (NEURONTIN) 100 MG capsule; Take 1 capsule (100 mg total) by mouth 3 (three) times daily as needed (for relief of burning discomfort in feet).      Medication List with Changes/Refills   New Medications    GABAPENTIN (NEURONTIN) 100 MG CAPSULE    Take 1 capsule (100 mg total) by mouth 3 (three) times daily as needed (for relief of burning discomfort in feet).   Current Medications    ACETAMINOPHEN (TYLENOL) 325 MG TABLET    One or two tablets every 6 hours as needed for pain    APIXABAN (ELIQUIS) 5 MG TAB    Take 5 mg by mouth 2 (two) times daily.    ASPIRIN (ASPIRIN LOW DOSE) 81 MG EC TABLET    2 Tablet, Delayed Release (E.C.) Oral At bedtime    ASPIRIN (ECOTRIN) 81 MG EC TABLET    Take 162 mg by mouth.    CEPHALEXIN (KEFLEX) 250 MG CAPSULE    Take 1 capsule (250 mg total) by mouth once daily.    CETIRIZINE (ZYRTEC) 10 MG TABLET    Take 10 mg by mouth once daily.    CLOBETASOL (TEMOVATE) 0.05 % EXTERNAL SOLUTION    Apply topically. .  AAA scalp qd - bid    MOMETASONE (ELOCON) 0.1 % CREAM    Apply 1  application topically Use as needed.   face    PRAVASTATIN (PRAVACHOL) 20 MG TABLET    pravastatin 20 mg tablet   Take 1 tablet every day by oral route.    PROPAFENONE (RHTHYMOL) 150 MG TAB    Take 150 mg by mouth every 8 (eight) hours.    SILVER SULFADIAZINE 1% (SILVADENE) 1 % CREAM    Apply topically 3 (three) times a week. Use dime-sized amount with finger 3 times/week.    TRIAMCINOLONE ACETONIDE 0.1% (KENALOG) 0.1 % CREAM    Apply topically Twice daily.   disp:  60g tube AAA     Discontinued Medications    TRAMADOL (ULTRAM) 50 MG TABLET    Take 50 mg by mouth every 12 (twelve) hours as needed for Pain.       Follow-up in about 5 months (around 7/30/2019) for for physical.

## 2019-02-27 ENCOUNTER — TELEPHONE (OUTPATIENT)
Dept: UROGYNECOLOGY | Facility: CLINIC | Age: 79
End: 2019-02-27

## 2019-02-27 PROBLEM — H18.601 KERATOCONUS OF RIGHT EYE: Status: ACTIVE | Noted: 2019-02-27

## 2019-02-27 PROBLEM — H18.529 ABMD (ANTERIOR BASEMENT MEMBRANE DYSTROPHY): Status: ACTIVE | Noted: 2019-02-27

## 2019-02-27 PROBLEM — Z96.1 PSEUDOPHAKIA OF BOTH EYES: Status: ACTIVE | Noted: 2019-02-27

## 2019-02-27 NOTE — TELEPHONE ENCOUNTER
----- Message from Reyna Wheeler MD sent at 2/26/2019  1:41 PM CST -----  Regarding: please send PT Rx  Please fill out signed Rx:    Dx:  Low back pain  Rx:  Lowe back, core strengthening      Please fax with copy of last clinic note & demographic information to:    [facility] to make appt: Phone number [phone].      Please call patient to remind her to call/make PT appt.     Thanks!

## 2019-02-27 NOTE — PROGRESS NOTES
HPI     MsNuha Hook is here for a routine eye exam.    Difficulty driving at night without correction. She also has near blur   with +1.50 OTC readers.    She also has dry eyes, improved with artificial tears qAM OU.    Would patient like a refraction today? yes    (+)drops: artificial tears qAM OU  (-)flashes  (-)floaters  (-)diplopia    (-)Diabetes    OCULAR HISTORY  Last Eye Exam: 6/15/16 with Dr. Winters  S/p phaco w/IOL OU  Keratoconus OD  ABMD OU  S/p sebaceous cyst removal OD 2016  Ectropion OU    FAMILY HISTORY  (?)Glaucoma         Last edited by Rach Alvarado, OD on 2/27/2019  1:22 PM. (History)            Assessment /Plan     For exam results, see Encounter Report.    Keratoconus of right eye   Cause of reduced best-corrected visual acuity. Pt tried RGPs for 1 year but was unsuccessful.    Bilateral dry eyes  ABMD (anterior basement membrane dystrophy)   Recommended artificial tears BID-TID OU.    Pseudophakia of both eyes   Doing well OU. Monitor.     Myopia with astigmatism and presbyopia, bilateral   New glasses prescription released, adaptation expected.  Okay to continue OTC readers.   Eyeglass Final Rx     Eyeglass Final Rx       Sphere Cylinder Axis Add    Right -2.25 +1.00 097 +2.75    Left -1.50 +1.00 097 +2.75    Expiration Date:  2/27/2020    Comments:  Single vision okay                   RTC 1 year

## 2019-02-27 NOTE — TELEPHONE ENCOUNTER
L/m to with PT phone number for pt. To call and set up appt.   Ph:  [phone], any questions she can call me back at [phone]

## 2019-02-28 ENCOUNTER — EXTERNAL CHRONIC CARE MANAGEMENT (OUTPATIENT)
Dept: PRIMARY CARE CLINIC | Facility: CLINIC | Age: 79
End: 2019-02-28
Payer: MEDICARE

## 2019-02-28 PROCEDURE — 99490 CHRNC CARE MGMT STAFF 1ST 20: CPT | Mod: S$PBB,,, | Performed by: INTERNAL MEDICINE

## 2019-02-28 PROCEDURE — 99490 PR CHRONIC CARE MGMT, 1ST 20 MIN: ICD-10-PCS | Mod: S$PBB,,, | Performed by: INTERNAL MEDICINE

## 2019-02-28 PROCEDURE — 99490 CHRNC CARE MGMT STAFF 1ST 20: CPT | Mod: PBBFAC | Performed by: INTERNAL MEDICINE

## 2019-03-06 ENCOUNTER — HOSPITAL ENCOUNTER (EMERGENCY)
Facility: HOSPITAL | Age: 79
Discharge: HOME | End: 2019-03-06
Payer: MEDICARE

## 2019-03-06 ENCOUNTER — TELEPHONE (OUTPATIENT)
Dept: INTERNAL MEDICINE | Facility: CLINIC | Age: 79
End: 2019-03-06

## 2019-03-06 VITALS
DIASTOLIC BLOOD PRESSURE: 80 MMHG | TEMPERATURE: 98 F | HEART RATE: 78 BPM | HEIGHT: 66 IN | RESPIRATION RATE: 18 BRPM | SYSTOLIC BLOOD PRESSURE: 140 MMHG | BODY MASS INDEX: 25.71 KG/M2 | OXYGEN SATURATION: 97 % | WEIGHT: 160 LBS

## 2019-03-06 DIAGNOSIS — R52 PAIN: Primary | ICD-10-CM

## 2019-03-06 DIAGNOSIS — M19.019 ARTHRITIS OF SHOULDER: ICD-10-CM

## 2019-03-06 DIAGNOSIS — M47.812 ARTHRITIS OF NECK: ICD-10-CM

## 2019-03-06 PROCEDURE — 73030 X-RAY EXAM OF SHOULDER: CPT | Mod: TC,FY,LT

## 2019-03-06 PROCEDURE — 72050 XR CERVICAL SPINE COMPLETE 5 VIEW: ICD-10-PCS | Mod: 26,,, | Performed by: RADIOLOGY

## 2019-03-06 PROCEDURE — 73030 XR SHOULDER TRAUMA 3 VIEW LEFT: ICD-10-PCS | Mod: 26,LT,, | Performed by: RADIOLOGY

## 2019-03-06 PROCEDURE — 99283 EMERGENCY DEPT VISIT LOW MDM: CPT

## 2019-03-06 PROCEDURE — 73030 X-RAY EXAM OF SHOULDER: CPT | Mod: 26,LT,, | Performed by: RADIOLOGY

## 2019-03-06 PROCEDURE — 72050 X-RAY EXAM NECK SPINE 4/5VWS: CPT | Mod: TC,FY

## 2019-03-06 PROCEDURE — 72050 X-RAY EXAM NECK SPINE 4/5VWS: CPT | Mod: 26,,, | Performed by: RADIOLOGY

## 2019-03-06 RX ORDER — HYDROCODONE BITARTRATE AND ACETAMINOPHEN 5; 325 MG/1; MG/1
1 TABLET ORAL EVERY 6 HOURS PRN
Qty: 8 TABLET | Refills: 0 | Status: SHIPPED | OUTPATIENT
Start: 2019-03-06 | End: 2019-03-09

## 2019-03-06 NOTE — TELEPHONE ENCOUNTER
Spoke with patient, she states that she pain on her left side starting at her neck going down to where she had her breast surgery. She states that she has had the pain almost a week. She is concerned about why she is having such horrible pain. Please Advise.

## 2019-03-06 NOTE — ED PROVIDER NOTES
"Encounter Date: 3/6/2019       History     Chief Complaint   Patient presents with    Neck Pain    Back Pain    Arm Pain     left     Theresa Hook is a 78 y.o female with PMHx including a-fib, cancer, hypertension, gout and stroke. She presents to ED with left neck, clavicular and shoulder pain x 1 week  She denies injury or trauma  She was seen at the  last night for "sinus infection". She did not mention pain at that time.   Patient with full ROM of neck and shoulder  She denies chest pain or SOB          Review of patient's allergies indicates:   Allergen Reactions    Collagen      Other reaction(s): burning  Other reaction(s): Swelling  Other reaction(s): Rash  Other reaction(s): Hallucinations    Erythromycin      Other reaction(s): Rash    Quinolones Swelling     Other reaction(s): concern b/o chronic tendonitis    Sulfa (sulfonamide antibiotics)      Other reaction(s): Rash  Other reaction(s): Dizziness    Sulfacetamide sodium Other (See Comments)     "spots in front of my eyes and dizzy"    Sulfasalazine Other (See Comments)     "spots in front of my eyes and dizzy"    Tetracycline      Other reaction(s): Rash     Past Medical History:   Diagnosis Date    A-fib     Amblyopia     right eye    Breast cancer 2005    left    Diarrhea 10/30/2012    Dry eye syndrome     HTN (hypertension) 8/14/2012    Interval gout 8/14/2012    Keratoconus of right eye     Stroke 06/2016    Dr. Rocha     Past Surgical History:   Procedure Laterality Date    APPENDECTOMY      BARTHOLIN GLAND CYST EXCISION      BREAST LUMPECTOMY Left 2005    CATARACT EXTRACTION  3/30/09    right eye    CATARACT EXTRACTION  4/13/09    left eye    CYSTOSCOPY  10/22/2015    [facility] 2ND [facility]LR    HYSTERECTOMY  10/22/2015    HYSTERECTOMY-VAGINAL-LAPAROSCOPIC (LAVH) N/A 10/22/2015    [facility]ND [facility]LR    REPAIR-ANTERIOR-POSTERIOR N/A 10/22/2015    [facility] " [facility] 2ND FLR    SLING-TRANSOBTERATOR TAPE  10/22/2015    [facility] 2ND FLR    spine cyst surgery      SUSPENSION-VAULT-VAGINAL  10/22/2015    [facility] at [facility] 2ND FLR     Family History   Problem Relation Age of Onset    Diabetes Mother     Diabetes Sister     Heart disease Sister     Cancer Brother     Cancer Paternal Grandmother     Cancer Cousin     Breast cancer Neg Hx     Cervical cancer Neg Hx     Vaginal cancer Neg Hx     Endometrial cancer Neg Hx     Ovarian cancer Neg Hx      Social History     Tobacco Use    Smoking status: Never Smoker    Smokeless tobacco: Never Used   Substance Use Topics    Alcohol use: Yes     Comment: social    Drug use: No     Review of Systems   Constitutional: Negative for fever.   HENT: Negative for sore throat.    Respiratory: Negative for shortness of breath.    Cardiovascular: Negative for chest pain.   Gastrointestinal: Negative for nausea.   Genitourinary: Negative for dysuria.   Musculoskeletal: Positive for arthralgias (left shoulder), neck pain and neck stiffness. Negative for back pain.   Skin: Negative for rash.   Neurological: Negative for weakness.   Hematological: Does not bruise/bleed easily.   All other systems reviewed and are negative.      Physical Exam     Initial Vitals [03/06/19 1215]   BP Pulse Resp Temp SpO2   (!) 152/80 90 20 98.3 °F (36.8 °C) 97 %      MAP       --         Physical Exam    Nursing note and vitals reviewed.  Constitutional: She appears well-developed and well-nourished.   HENT:   Head: Normocephalic.   Eyes: Pupils are equal, round, and reactive to light.   Neck: Normal range of motion.   Cardiovascular: Normal rate, regular rhythm and normal heart sounds.   Pulmonary/Chest: Breath sounds normal.   Musculoskeletal: Normal range of motion. She exhibits tenderness.        Left shoulder: She exhibits tenderness, bony tenderness and  pain. She exhibits normal range of motion, no swelling, no effusion, no crepitus, no deformity, no laceration, no spasm, normal pulse and normal strength.        Cervical back: She exhibits tenderness, bony tenderness and pain. She exhibits normal range of motion, no swelling, no edema, no deformity, no laceration, no spasm and normal pulse.        Back:         Arms:  Neurological: She is alert and oriented to person, place, and time.   Skin: Skin is warm and dry.   Psychiatric: She has a normal mood and affect. Her behavior is normal. Judgment and thought content normal.         ED Course   Procedures  Labs Reviewed - No data to display       Imaging Results          X-Ray Shoulder Trauma Left (Final result)  Result time 03/06/19 13:20:43    Final result by Isaac Posada MD (03/06/19 13:20:43)                 Impression:      1. Mild changes of degenerative osteoarthrosis.  2. Bone demineralization.  3. Left upper lobe granulomas.      Electronically signed by: Isaac Posada  Date:    03/06/2019  Time:    13:20             Narrative:    EXAMINATION:  XR SHOULDER TRAUMA 3 VIEW LEFT    TECHNIQUE:  Two or three views of the left shoulder were performed.    COMPARISON:  Chest x-ray 06/30/2016.    FINDINGS:  No acute fracture or dislocation.  No significant soft tissue swelling.    Humeral head normally positioned with the glenoid cavity.  Mild glenohumeral degenerative osteoarthrosis with mild joint space narrowing and small osteophyte formation.    AC joint is intact with mild degenerative osteoarthrosis.    There is bone demineralization.  Small granulomas of the left upper lobe.                               X-Ray Cervical Spine Complete 5 view (Final result)  Result time 03/06/19 13:21:45   Procedure changed from X-Ray Cervical Spine AP And Lateral     Final result by Isaac Posada MD (03/06/19 13:21:45)                 Impression:      1. Mild reversal of the normal cervical lordosis.  2. Grade 1  "anterolisthesis of C3 on C4.  3. Mild to moderate multilevel degenerative disc disease resulting in mild to moderate bilateral neural foraminal narrowing.  4. Bone demineralization.      Electronically signed by: Isaac Posada  Date:    03/06/2019  Time:    13:21             Narrative:    EXAMINATION:  XR CERVICAL SPINE COMPLETE 5 VIEW    CLINICAL HISTORY:  pain;. Pain, unspecified    TECHNIQUE:  AP, Lateral, bilateral oblique and open mouth views of the cervical spine were performed.    COMPARISON:  None    FINDINGS:  Mild reversal the normal cervical lordosis.  3 mm anterolisthesis of C3 on C4.  Remaining cervical vertebral bodies are normal in height and alignment.  No acute fracture.  No significant prevertebral soft tissue swelling.    There is mild to moderate multilevel degenerative disc disease most pronounced from C4 through T1 with disc space narrowing and prominent osteophyte formation.  Bilateral oblique views demonstrate mild to moderate bilateral neural foraminal narrowing.    There is bone demineralization.                                 Medical Decision Making:   Initial Assessment:   Patient with left neck, clavicular and shoulder pain x 1 week  She denies injury or trauma  She was seen at the  last night for "sinus infection". She did not mention pain at that time.   Patient with full ROM of neck and shoulder    Patient with tenderness to left lateral aspect of neck, clavicular area, and left shoulder     Differential Diagnosis:   Neck fracture, DJD, arthritis   Shoulder fracture, strain, arthritis  ED Management:  Dr. Carreno into evaluate. XR left shoulder and cervical spine per his recommendation    XR with no acute findings    Discussed physical exam findings with patient  No acute emergent medical condition identified at this time to warrant further testing/diagnostics.  Plan to discharge patient home with instructions per AVS.  Follow-up with PCP in 2-5 days or return to ED if symptoms " worsen.  Patient verbalized agreement to discharge treatment plan.                          Clinical Impression:       ICD-10-CM ICD-9-CM   1. Pain R52 780.96   2. Arthritis of shoulder M19.019 716.91   3. Arthritis of neck M47.812 721.0                                Rach Short NP  03/06/19 8915

## 2019-03-06 NOTE — TELEPHONE ENCOUNTER
----- Message from Anna Pro sent at 3/6/2019 10:02 AM CST -----  Contact: [phone]  Patient requesting a call from the office in regards to questions she has . Please call and advise, Thanks

## 2019-03-06 NOTE — TELEPHONE ENCOUNTER
Mariajose, could you call her? I don't know about this chest pain but could gabapentin help? She has this. If she is worried it is cardiac let me know.  Sincerely, Dr. Manuela Robertson

## 2019-03-31 ENCOUNTER — EXTERNAL CHRONIC CARE MANAGEMENT (OUTPATIENT)
Dept: PRIMARY CARE CLINIC | Facility: CLINIC | Age: 79
End: 2019-03-31
Payer: MEDICARE

## 2019-03-31 PROCEDURE — 99490 CHRNC CARE MGMT STAFF 1ST 20: CPT | Mod: S$PBB,,, | Performed by: INTERNAL MEDICINE

## 2019-03-31 PROCEDURE — 99490 PR CHRONIC CARE MGMT, 1ST 20 MIN: ICD-10-PCS | Mod: S$PBB,,, | Performed by: INTERNAL MEDICINE

## 2019-03-31 PROCEDURE — 99490 CHRNC CARE MGMT STAFF 1ST 20: CPT | Mod: PBBFAC | Performed by: INTERNAL MEDICINE

## 2019-04-23 ENCOUNTER — PES CALL (OUTPATIENT)
Dept: ADMINISTRATIVE | Facility: CLINIC | Age: 79
End: 2019-04-23

## 2019-04-30 ENCOUNTER — EXTERNAL CHRONIC CARE MANAGEMENT (OUTPATIENT)
Dept: PRIMARY CARE CLINIC | Facility: CLINIC | Age: 79
End: 2019-04-30
Payer: MEDICARE

## 2019-04-30 PROCEDURE — 99490 CHRNC CARE MGMT STAFF 1ST 20: CPT | Mod: S$PBB,,, | Performed by: INTERNAL MEDICINE

## 2019-04-30 PROCEDURE — 99490 PR CHRONIC CARE MGMT, 1ST 20 MIN: ICD-10-PCS | Mod: S$PBB,,, | Performed by: INTERNAL MEDICINE

## 2019-04-30 PROCEDURE — 99490 CHRNC CARE MGMT STAFF 1ST 20: CPT | Mod: PBBFAC | Performed by: INTERNAL MEDICINE

## 2019-05-18 ENCOUNTER — HOSPITAL ENCOUNTER (EMERGENCY)
Facility: HOSPITAL | Age: 79
Discharge: HOME | End: 2019-05-18
Attending: INTERNAL MEDICINE
Payer: MEDICARE

## 2019-05-18 VITALS
HEART RATE: 90 BPM | BODY MASS INDEX: 27.32 KG/M2 | TEMPERATURE: 98 F | WEIGHT: 170 LBS | DIASTOLIC BLOOD PRESSURE: 62 MMHG | SYSTOLIC BLOOD PRESSURE: 117 MMHG | RESPIRATION RATE: 20 BRPM | OXYGEN SATURATION: 98 % | HEIGHT: 66 IN

## 2019-05-18 DIAGNOSIS — S13.4XXA WHIPLASH INJURY TO NECK, INITIAL ENCOUNTER: Primary | ICD-10-CM

## 2019-05-18 PROCEDURE — 99284 EMERGENCY DEPT VISIT MOD MDM: CPT

## 2019-05-18 RX ORDER — CYCLOBENZAPRINE HCL 10 MG
10 TABLET ORAL 3 TIMES DAILY PRN
Qty: 15 TABLET | Refills: 0 | Status: SHIPPED | OUTPATIENT
Start: 2019-05-18 | End: 2019-05-23

## 2019-05-18 RX ORDER — IBUPROFEN 600 MG/1
600 TABLET ORAL EVERY 6 HOURS PRN
Qty: 20 TABLET | Refills: 0 | Status: SHIPPED | OUTPATIENT
Start: 2019-05-18 | End: 2020-06-29
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
## 2019-05-18 NOTE — ED NOTES
AVS DISCUSSED WITH PT/SHE VERBALIZED UNDERSTANDING. MEDICATIONS DISCUSSED WITH PT/SHE VERBALIZED UNDERSTANDING. PT INSTRUCTED TO FOLLOW UP WITH HER PRIMARY MD IF THE PAIN DOES NOT GET BETTER, IF PAIN GETS WORSE ALSO FOLLOW UP WITH PRIMARY MD/PT VERBALIZED UNDERSTANDING. PT ESCORTED TO THE ER DESK TO COMPLETE VISIT.

## 2019-05-18 NOTE — DISCHARGE INSTRUCTIONS
Ice to neck 5 min every 4 hours as needed for pain, gentle range of motion exercises, meds as prescribed,

## 2019-05-31 ENCOUNTER — EXTERNAL CHRONIC CARE MANAGEMENT (OUTPATIENT)
Dept: PRIMARY CARE CLINIC | Facility: CLINIC | Age: 79
End: 2019-05-31
Payer: MEDICARE

## 2019-05-31 PROCEDURE — 99490 CHRNC CARE MGMT STAFF 1ST 20: CPT | Mod: S$PBB,,, | Performed by: INTERNAL MEDICINE

## 2019-05-31 PROCEDURE — 99490 CHRNC CARE MGMT STAFF 1ST 20: CPT | Mod: PBBFAC | Performed by: INTERNAL MEDICINE

## 2019-05-31 PROCEDURE — 99490 PR CHRONIC CARE MGMT, 1ST 20 MIN: ICD-10-PCS | Mod: S$PBB,,, | Performed by: INTERNAL MEDICINE

## 2019-06-10 ENCOUNTER — TELEPHONE (OUTPATIENT)
Dept: UROGYNECOLOGY | Facility: CLINIC | Age: 79
End: 2019-06-10

## 2019-06-10 NOTE — TELEPHONE ENCOUNTER
Jules Uriarte,    Can you please contact pt and help her schedule a follow up appointment with Dr. Wheeler at the [facility] location sometime in Sept or first available.    Thank you,  Az

## 2019-06-10 NOTE — TELEPHONE ENCOUNTER
Jules Bernardo,  Appointment scheduled with Dr. Wheeler for 6/25/2019 at 11:00am. I had to double book her because she is in [place] and she is trying to see her PCP the same day.      Thank you,  Kalani

## 2019-06-10 NOTE — TELEPHONE ENCOUNTER
----- Message from Mohinder Philip sent at 6/10/2019  9:30 AM CDT -----  Please call pt she needs to schedule a appt with  @ [facility] In Sept nothing available that I can schedule  [phone]

## 2019-06-30 ENCOUNTER — EXTERNAL CHRONIC CARE MANAGEMENT (OUTPATIENT)
Dept: PRIMARY CARE CLINIC | Facility: CLINIC | Age: 79
End: 2019-06-30
Payer: MEDICARE

## 2019-06-30 PROCEDURE — 99490 CHRNC CARE MGMT STAFF 1ST 20: CPT | Mod: S$PBB,,, | Performed by: INTERNAL MEDICINE

## 2019-06-30 PROCEDURE — 99490 PR CHRONIC CARE MGMT, 1ST 20 MIN: ICD-10-PCS | Mod: S$PBB,,, | Performed by: INTERNAL MEDICINE

## 2019-06-30 PROCEDURE — 99490 CHRNC CARE MGMT STAFF 1ST 20: CPT | Mod: PBBFAC | Performed by: INTERNAL MEDICINE

## 2019-07-31 ENCOUNTER — EXTERNAL CHRONIC CARE MANAGEMENT (OUTPATIENT)
Dept: PRIMARY CARE CLINIC | Facility: CLINIC | Age: 79
End: 2019-07-31
Payer: MEDICARE

## 2019-07-31 PROCEDURE — 99490 PR CHRONIC CARE MGMT, 1ST 20 MIN: ICD-10-PCS | Mod: S$PBB,,, | Performed by: INTERNAL MEDICINE

## 2019-07-31 PROCEDURE — 99490 CHRNC CARE MGMT STAFF 1ST 20: CPT | Mod: S$PBB,,, | Performed by: INTERNAL MEDICINE

## 2019-07-31 PROCEDURE — 99490 CHRNC CARE MGMT STAFF 1ST 20: CPT | Mod: PBBFAC | Performed by: INTERNAL MEDICINE

## 2019-08-31 ENCOUNTER — EXTERNAL CHRONIC CARE MANAGEMENT (OUTPATIENT)
Dept: PRIMARY CARE CLINIC | Facility: CLINIC | Age: 79
End: 2019-08-31
Payer: MEDICARE

## 2019-08-31 PROCEDURE — 99490 CHRNC CARE MGMT STAFF 1ST 20: CPT | Mod: PBBFAC | Performed by: INTERNAL MEDICINE

## 2019-08-31 PROCEDURE — 99490 CHRNC CARE MGMT STAFF 1ST 20: CPT | Mod: S$PBB,,, | Performed by: INTERNAL MEDICINE

## 2019-08-31 PROCEDURE — 99490 PR CHRONIC CARE MGMT, 1ST 20 MIN: ICD-10-PCS | Mod: S$PBB,,, | Performed by: INTERNAL MEDICINE

## 2019-09-30 ENCOUNTER — EXTERNAL CHRONIC CARE MANAGEMENT (OUTPATIENT)
Dept: PRIMARY CARE CLINIC | Facility: CLINIC | Age: 79
End: 2019-09-30
Payer: MEDICARE

## 2019-09-30 PROCEDURE — 99490 CHRNC CARE MGMT STAFF 1ST 20: CPT | Mod: PBBFAC | Performed by: INTERNAL MEDICINE

## 2019-09-30 PROCEDURE — 99490 PR CHRONIC CARE MGMT, 1ST 20 MIN: ICD-10-PCS | Mod: S$PBB,,, | Performed by: INTERNAL MEDICINE

## 2019-09-30 PROCEDURE — 99490 CHRNC CARE MGMT STAFF 1ST 20: CPT | Mod: S$PBB,,, | Performed by: INTERNAL MEDICINE

## 2019-10-31 ENCOUNTER — EXTERNAL CHRONIC CARE MANAGEMENT (OUTPATIENT)
Dept: PRIMARY CARE CLINIC | Facility: CLINIC | Age: 79
End: 2019-10-31
Payer: MEDICARE

## 2019-10-31 PROCEDURE — 99490 PR CHRONIC CARE MGMT, 1ST 20 MIN: ICD-10-PCS | Mod: S$PBB,,, | Performed by: INTERNAL MEDICINE

## 2019-10-31 PROCEDURE — 99490 CHRNC CARE MGMT STAFF 1ST 20: CPT | Mod: PBBFAC | Performed by: INTERNAL MEDICINE

## 2019-10-31 PROCEDURE — 99490 CHRNC CARE MGMT STAFF 1ST 20: CPT | Mod: S$PBB,,, | Performed by: INTERNAL MEDICINE

## 2019-11-13 ENCOUNTER — TELEPHONE (OUTPATIENT)
Dept: UROGYNECOLOGY | Facility: CLINIC | Age: 79
End: 2019-11-13

## 2019-11-13 DIAGNOSIS — N39.0 FREQUENT UTI: ICD-10-CM

## 2019-11-13 RX ORDER — CEPHALEXIN 250 MG/1
250 CAPSULE ORAL DAILY
Qty: 60 CAPSULE | Refills: 3 | Status: SHIPPED | OUTPATIENT
Start: 2019-11-13 | End: 2020-06-29 | Stop reason: SDUPTHER

## 2019-11-13 NOTE — TELEPHONE ENCOUNTER
----- Message from Yvonne Salazar sent at 11/13/2019 11:03 AM CST -----  Contact: NANCY BAGLEY [[id number]]      Can the clinic reply in [facility]: no      Please refill the medication(s) listed below. Please call the patient when the prescription(s) is ready for  at this phone number   [phone] (home)     Medication #1 cephALEXin (KEFLEX) 250 MG capsule        Preferred Pharmacy:  [facility] - [address] [address]  Phone: [phone] Fax: [phone]       n/a

## 2019-11-13 NOTE — TELEPHONE ENCOUNTER
Spoke to pt, pt is requesting a refill on keflex to continue taking prophylactically. Please advise.

## 2019-11-14 ENCOUNTER — TELEPHONE (OUTPATIENT)
Dept: UROGYNECOLOGY | Facility: CLINIC | Age: 79
End: 2019-11-14

## 2019-11-14 NOTE — TELEPHONE ENCOUNTER
Pt was informed on her voice mail that her RX was refilled and that she needed to schedule her 1 year FU with Lyssa 3/2020.

## 2019-11-14 NOTE — TELEPHONE ENCOUNTER
----- Message from Reyna Wheeler MD sent at 11/13/2019  9:20 PM CST -----  Please let patient know that I refilled Rx.  She needs to see Lyssa back in March 2020 for a 1 year check. Thanks!  ----- Message -----  From: Az Lopez MA  Sent: 11/13/2019   3:54 PM CST  To: Reyna Wheeler MD    Spoke to pt, pt is requesting a refill on keflex to continue taking prophylactically. Please advise.

## 2019-11-30 ENCOUNTER — EXTERNAL CHRONIC CARE MANAGEMENT (OUTPATIENT)
Dept: PRIMARY CARE CLINIC | Facility: CLINIC | Age: 79
End: 2019-11-30
Payer: MEDICARE

## 2019-11-30 PROCEDURE — 99490 CHRNC CARE MGMT STAFF 1ST 20: CPT | Mod: S$PBB,,, | Performed by: INTERNAL MEDICINE

## 2019-11-30 PROCEDURE — 99490 CHRNC CARE MGMT STAFF 1ST 20: CPT | Mod: PBBFAC | Performed by: INTERNAL MEDICINE

## 2019-11-30 PROCEDURE — 99490 PR CHRONIC CARE MGMT, 1ST 20 MIN: ICD-10-PCS | Mod: S$PBB,,, | Performed by: INTERNAL MEDICINE

## 2019-12-31 ENCOUNTER — EXTERNAL CHRONIC CARE MANAGEMENT (OUTPATIENT)
Dept: PRIMARY CARE CLINIC | Facility: CLINIC | Age: 79
End: 2019-12-31
Payer: MEDICARE

## 2019-12-31 PROCEDURE — 99490 CHRNC CARE MGMT STAFF 1ST 20: CPT | Mod: S$PBB,,, | Performed by: INTERNAL MEDICINE

## 2019-12-31 PROCEDURE — 99490 CHRNC CARE MGMT STAFF 1ST 20: CPT | Mod: PBBFAC | Performed by: INTERNAL MEDICINE

## 2019-12-31 PROCEDURE — 99490 PR CHRONIC CARE MGMT, 1ST 20 MIN: ICD-10-PCS | Mod: S$PBB,,, | Performed by: INTERNAL MEDICINE

## 2020-01-31 ENCOUNTER — EXTERNAL CHRONIC CARE MANAGEMENT (OUTPATIENT)
Dept: PRIMARY CARE CLINIC | Facility: CLINIC | Age: 80
End: 2020-01-31
Payer: MEDICARE

## 2020-01-31 PROCEDURE — 99490 PR CHRONIC CARE MGMT, 1ST 20 MIN: ICD-10-PCS | Mod: S$PBB,,, | Performed by: INTERNAL MEDICINE

## 2020-01-31 PROCEDURE — 99490 CHRNC CARE MGMT STAFF 1ST 20: CPT | Mod: PBBFAC | Performed by: INTERNAL MEDICINE

## 2020-01-31 PROCEDURE — 99490 CHRNC CARE MGMT STAFF 1ST 20: CPT | Mod: S$PBB,,, | Performed by: INTERNAL MEDICINE

## 2020-02-29 ENCOUNTER — EXTERNAL CHRONIC CARE MANAGEMENT (OUTPATIENT)
Dept: PRIMARY CARE CLINIC | Facility: CLINIC | Age: 80
End: 2020-02-29
Payer: MEDICARE

## 2020-02-29 PROCEDURE — 99490 CHRNC CARE MGMT STAFF 1ST 20: CPT | Mod: PBBFAC | Performed by: INTERNAL MEDICINE

## 2020-02-29 PROCEDURE — 99490 PR CHRONIC CARE MGMT, 1ST 20 MIN: ICD-10-PCS | Mod: S$PBB,,, | Performed by: INTERNAL MEDICINE

## 2020-02-29 PROCEDURE — 99490 CHRNC CARE MGMT STAFF 1ST 20: CPT | Mod: S$PBB,,, | Performed by: INTERNAL MEDICINE

## 2020-03-03 ENCOUNTER — HOSPITAL ENCOUNTER (OUTPATIENT)
Dept: RADIOLOGY | Facility: HOSPITAL | Age: 80
Discharge: HOME | End: 2020-03-03
Attending: INTERNAL MEDICINE
Payer: MEDICARE

## 2020-03-03 VITALS — WEIGHT: 168 LBS | HEIGHT: 66 IN | BODY MASS INDEX: 27 KG/M2

## 2020-03-03 DIAGNOSIS — Z02.89 SELF-REFERRAL: ICD-10-CM

## 2020-03-03 DIAGNOSIS — Z12.31 ENCOUNTER FOR SCREENING MAMMOGRAM FOR MALIGNANT NEOPLASM OF BREAST: ICD-10-CM

## 2020-03-31 ENCOUNTER — EXTERNAL CHRONIC CARE MANAGEMENT (OUTPATIENT)
Dept: PRIMARY CARE CLINIC | Facility: CLINIC | Age: 80
End: 2020-03-31
Payer: MEDICARE

## 2020-03-31 PROCEDURE — 99490 PR CHRONIC CARE MGMT, 1ST 20 MIN: ICD-10-PCS | Mod: S$PBB,,, | Performed by: INTERNAL MEDICINE

## 2020-03-31 PROCEDURE — 99490 CHRNC CARE MGMT STAFF 1ST 20: CPT | Mod: S$PBB,,, | Performed by: INTERNAL MEDICINE

## 2020-03-31 PROCEDURE — 99490 CHRNC CARE MGMT STAFF 1ST 20: CPT | Mod: PBBFAC | Performed by: INTERNAL MEDICINE

## 2020-04-16 ENCOUNTER — OFFICE VISIT (OUTPATIENT)
Dept: INTERNAL MEDICINE | Facility: CLINIC | Age: 80
End: 2020-04-16
Payer: MEDICARE

## 2020-04-16 DIAGNOSIS — I48.0 PAROXYSMAL ATRIAL FIBRILLATION: ICD-10-CM

## 2020-04-16 DIAGNOSIS — Z86.73 OLD EMBOLIC STROKE WITHOUT LATE EFFECT: ICD-10-CM

## 2020-04-16 DIAGNOSIS — R39.15 URINARY URGENCY: Primary | ICD-10-CM

## 2020-04-16 DIAGNOSIS — C44.90 SKIN CANCER: ICD-10-CM

## 2020-04-16 PROCEDURE — 99443 PR PHYSICIAN TELEPHONE EVALUATION 21-30 MIN: CPT | Mod: 95,,, | Performed by: INTERNAL MEDICINE

## 2020-04-16 PROCEDURE — 99443 PR PHYSICIAN TELEPHONE EVALUATION 21-30 MIN: ICD-10-PCS | Mod: 95,,, | Performed by: INTERNAL MEDICINE

## 2020-04-16 RX ORDER — CEPHALEXIN 250 MG/1
CAPSULE ORAL
Qty: 1 CAPSULE | Refills: 0
Start: 2020-04-16 | End: 2021-03-30

## 2020-04-16 NOTE — PROGRESS NOTES
Subjective:       Patient ID: Theresa Hook is a 79 y.o. female.    Chief Complaint: No chief complaint on file.  The patient location is: home  The chief complaint leading to consultation is: overall review  Visit type: audio only  Total time spent with patient: 30 min  Each patient to whom he or she provides medical services by telemedicine is:  (1) informed of the relationship between the physician and patient and the respective role of any other health care provider with respect to management of the patient; and (2) notified that he or she may decline to receive medical services by telemedicine and may withdraw from such care at any time.    Notes: This dictation was performed using using MModal.    Recent iron defic had c-scope and egd nothing found   Got iron infusion    Off eliquis b/o cost    Get with your cardiologist    Overall doing well. High quality of life. Active social life. Happy.    HPI  Review of Systems   Constitutional: Negative for activity change, appetite change, chills, fatigue, fever and unexpected weight change.   HENT: Negative for hearing loss.    Eyes: Negative for visual disturbance.   Respiratory: Negative for cough, chest tightness, shortness of breath and wheezing.    Cardiovascular: Negative for chest pain, palpitations and leg swelling.   Gastrointestinal: Negative for abdominal pain, constipation, nausea and vomiting.   Genitourinary: Negative for dysuria, frequency and urgency.   Musculoskeletal: Negative for arthralgias, back pain, gait problem, joint swelling and myalgias.   Skin: Negative for rash.   Neurological: Negative for light-headedness and headaches.   Psychiatric/Behavioral: Negative for dysphoric mood and sleep disturbance. The patient is not nervous/anxious.        Objective:      Physical Exam  audio only, sounds great  Assessment:       1. Urinary urgency    2. Paroxysmal atrial fibrillation    3. Old embolic stroke without late effect, assoc a fib      4. Skin cancer, 2005 squamous cell right nasolabial fold.        Plan:   Diagnoses and all orders for this visit:    Urinary urgency, wear pad f/u with Dr. Wheeler prn    Paroxysmal atrial fibrillation, off Eliquis b/o cost. Strongly advised to get with nephew pharmcist for coupn program Xarelto or Eliquis or with cardiologist. She does not want coumadin    Old embolic stroke without late effect, assoc a fib , recovered. 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    Other orders from Dr. Wheeler suppression successful  -     cephALEXin (KEFLEX) 250 MG capsule; One daily to suppress UTI from Dr. Wheeler

## 2020-04-30 ENCOUNTER — EXTERNAL CHRONIC CARE MANAGEMENT (OUTPATIENT)
Dept: PRIMARY CARE CLINIC | Facility: CLINIC | Age: 80
End: 2020-04-30
Payer: MEDICARE

## 2020-04-30 PROCEDURE — 99490 PR CHRONIC CARE MGMT, 1ST 20 MIN: ICD-10-PCS | Mod: S$PBB,,, | Performed by: INTERNAL MEDICINE

## 2020-04-30 PROCEDURE — 99490 CHRNC CARE MGMT STAFF 1ST 20: CPT | Mod: PBBFAC | Performed by: INTERNAL MEDICINE

## 2020-04-30 PROCEDURE — 99490 CHRNC CARE MGMT STAFF 1ST 20: CPT | Mod: S$PBB,,, | Performed by: INTERNAL MEDICINE

## 2020-05-31 ENCOUNTER — EXTERNAL CHRONIC CARE MANAGEMENT (OUTPATIENT)
Dept: PRIMARY CARE CLINIC | Facility: CLINIC | Age: 80
End: 2020-05-31
Payer: MEDICARE

## 2020-05-31 PROCEDURE — 99490 CHRNC CARE MGMT STAFF 1ST 20: CPT | Mod: S$PBB,,, | Performed by: INTERNAL MEDICINE

## 2020-05-31 PROCEDURE — 99490 PR CHRONIC CARE MGMT, 1ST 20 MIN: ICD-10-PCS | Mod: S$PBB,,, | Performed by: INTERNAL MEDICINE

## 2020-05-31 PROCEDURE — 99490 CHRNC CARE MGMT STAFF 1ST 20: CPT | Mod: PBBFAC | Performed by: INTERNAL MEDICINE

## 2020-06-15 ENCOUNTER — PATIENT OUTREACH (OUTPATIENT)
Dept: ADMINISTRATIVE | Facility: HOSPITAL | Age: 80
End: 2020-06-15

## 2020-06-15 NOTE — LETTER
Karina 15, 2020    Theresa Hook  [address] [place] [address] [place] [address]             [facility] [address] [facility] [address]  Phone: [phone] Dear Theresa Hook,    [facility] is committed to your overall health.  To help you get the most out of each of your visits, we will review your information to make sure you are up to date on all of your recommended tests and/or procedures.      As a new patient to [facility] , we may not have your complete medical records. She has found that your chart shows you may be due for:    Health Maintenance Due   Topic Date Due    TETANUS VACCINE  09/30/1958    Shingles Vaccine (2 of 3) 01/20/2015    Pneumococcal Vaccine (65+ High/Highest Risk) (2 of 2 - PPSV23) 02/07/2018    Lipid Panel  06/20/2019    DEXA SCAN  01/25/2020     If you have had any of the above done at another facility, please let us know so that we may obtain copies from that facility.  If you have a copy of these records, please provide a copy for us to scan into your chart.    If you are currently taking medication, please bring it with you to your appointment for review.    Also, if you have any type of Advanced Directives, please bring them with you to your office visit so we may scan them into your chart.      Thank You,  Your [facility]  Ladonna Brand L.P.N. Clinical Care Coordinator  [address] [place] [address], [place] [address] [phone]

## 2020-06-22 ENCOUNTER — TELEPHONE (OUTPATIENT)
Dept: FAMILY MEDICINE | Facility: CLINIC | Age: 80
End: 2020-06-22

## 2020-06-22 DIAGNOSIS — R39.9 UTI SYMPTOMS: Primary | ICD-10-CM

## 2020-06-23 ENCOUNTER — LAB VISIT (OUTPATIENT)
Dept: LAB | Facility: HOSPITAL | Age: 80
End: 2020-06-23
Attending: NURSE PRACTITIONER
Payer: MEDICARE

## 2020-06-23 DIAGNOSIS — R39.9 UTI SYMPTOMS: ICD-10-CM

## 2020-06-23 LAB
BACTERIA #/AREA URNS HPF: ABNORMAL /HPF
BILIRUB UR QL STRIP: NEGATIVE
CLARITY UR: CLEAR
COLOR UR: YELLOW
GLUCOSE UR QL STRIP: NEGATIVE
HGB UR QL STRIP: NEGATIVE
KETONES UR QL STRIP: NEGATIVE
LEUKOCYTE ESTERASE UR QL STRIP: ABNORMAL
MICROSCOPIC COMMENT: ABNORMAL
NITRITE UR QL STRIP: NEGATIVE
PH UR STRIP: 8 [PH] (ref 5–8)
PROT UR QL STRIP: NEGATIVE
SP GR UR STRIP: 1.02 (ref 1–1.03)
URN SPEC COLLECT METH UR: ABNORMAL
UROBILINOGEN UR STRIP-ACNC: NEGATIVE EU/DL
WBC #/AREA URNS HPF: 8 /HPF (ref 0–5)

## 2020-06-23 PROCEDURE — 87086 URINE CULTURE/COLONY COUNT: CPT

## 2020-06-23 PROCEDURE — 81000 URINALYSIS NONAUTO W/SCOPE: CPT

## 2020-06-24 LAB
BACTERIA UR CULT: NORMAL
BACTERIA UR CULT: NORMAL

## 2020-06-29 ENCOUNTER — OFFICE VISIT (OUTPATIENT)
Dept: FAMILY MEDICINE | Facility: CLINIC | Age: 80
End: 2020-06-29
Payer: MEDICARE

## 2020-06-29 VITALS
BODY MASS INDEX: 26.76 KG/M2 | SYSTOLIC BLOOD PRESSURE: 123 MMHG | WEIGHT: 166.5 LBS | DIASTOLIC BLOOD PRESSURE: 85 MMHG | TEMPERATURE: 99 F | RESPIRATION RATE: 17 BRPM | HEART RATE: 68 BPM | HEIGHT: 66 IN

## 2020-06-29 DIAGNOSIS — R19.07 ABDOMINAL SWELLING, GENERALIZED: Primary | ICD-10-CM

## 2020-06-29 DIAGNOSIS — Z76.89 ENCOUNTER TO ESTABLISH CARE WITH NEW DOCTOR: ICD-10-CM

## 2020-06-29 DIAGNOSIS — Z78.0 ASYMPTOMATIC MENOPAUSAL STATE: ICD-10-CM

## 2020-06-29 PROCEDURE — 99214 OFFICE O/P EST MOD 30 MIN: CPT | Mod: S$GLB,,, | Performed by: FAMILY MEDICINE

## 2020-06-29 PROCEDURE — 99214 PR OFFICE/OUTPT VISIT, EST, LEVL IV, 30-39 MIN: ICD-10-PCS | Mod: S$GLB,,, | Performed by: FAMILY MEDICINE

## 2020-06-29 RX ORDER — HYDROGEN PEROXIDE 3 %
20 SOLUTION, NON-ORAL MISCELLANEOUS DAILY
COMMUNITY
Start: 2020-05-13

## 2020-06-30 ENCOUNTER — EXTERNAL CHRONIC CARE MANAGEMENT (OUTPATIENT)
Dept: PRIMARY CARE CLINIC | Facility: CLINIC | Age: 80
End: 2020-06-30
Payer: MEDICARE

## 2020-06-30 PROCEDURE — G2058 CCM ADD 20MIN: HCPCS | Mod: PBBFAC | Performed by: INTERNAL MEDICINE

## 2020-06-30 PROCEDURE — 99490 CHRNC CARE MGMT STAFF 1ST 20: CPT | Mod: PBBFAC | Performed by: INTERNAL MEDICINE

## 2020-06-30 PROCEDURE — G2058 PR CHRON CARE MGMT, EA ADDTL 20 MINS: ICD-10-PCS | Mod: S$PBB,,, | Performed by: INTERNAL MEDICINE

## 2020-06-30 PROCEDURE — 99490 CHRNC CARE MGMT STAFF 1ST 20: CPT | Mod: S$PBB,,, | Performed by: INTERNAL MEDICINE

## 2020-06-30 PROCEDURE — 99490 PR CHRONIC CARE MGMT, 1ST 20 MIN: ICD-10-PCS | Mod: S$PBB,,, | Performed by: INTERNAL MEDICINE

## 2020-06-30 PROCEDURE — G2058 CCM ADD 20MIN: HCPCS | Mod: S$PBB,,, | Performed by: INTERNAL MEDICINE

## 2020-07-01 ENCOUNTER — HOSPITAL ENCOUNTER (OUTPATIENT)
Dept: RADIOLOGY | Facility: HOSPITAL | Age: 80
Discharge: HOME | End: 2020-07-01
Attending: FAMILY MEDICINE
Payer: MEDICARE

## 2020-07-01 DIAGNOSIS — R19.07 ABDOMINAL SWELLING, GENERALIZED: ICD-10-CM

## 2020-07-01 DIAGNOSIS — I10 HTN (HYPERTENSION): ICD-10-CM

## 2020-07-01 PROCEDURE — 76700 US EXAM ABDOM COMPLETE: CPT | Mod: 26,,, | Performed by: RADIOLOGY

## 2020-07-01 PROCEDURE — 76700 US ABDOMEN COMPLETE: ICD-10-PCS | Mod: 26,,, | Performed by: RADIOLOGY

## 2020-07-01 PROCEDURE — 76700 US EXAM ABDOM COMPLETE: CPT | Mod: TC

## 2020-07-14 ENCOUNTER — TELEPHONE (OUTPATIENT)
Dept: FAMILY MEDICINE | Facility: CLINIC | Age: 80
End: 2020-07-14

## 2020-07-14 DIAGNOSIS — N39.0 CHRONIC UTI (URINARY TRACT INFECTION): Primary | ICD-10-CM

## 2020-07-14 RX ORDER — NITROFURANTOIN 25; 75 MG/1; MG/1
100 CAPSULE ORAL NIGHTLY
Qty: 30 CAPSULE | Refills: 3 | Status: SHIPPED | OUTPATIENT
Start: 2020-07-14 | End: 2021-01-12 | Stop reason: SDUPTHER

## 2020-07-14 NOTE — TELEPHONE ENCOUNTER
----- Message from Anabella Klein LPN sent at 7/14/2020 10:44 AM CDT -----  Contact: freeman with [facility]  Yes pt is will to try the Macrobid, please the script to [facility]  ----- Message -----  From: John Santoyo MD  Sent: 7/14/2020   9:52 AM CDT  To: Anabella Klein LPN    [facility] does not have a female urologist in [place]. We can try switching the keflex to macrobid and see if that works for her before placing the referral if she would like to try that?    John Santoyo MD    ----- Message -----  From: Anabella Klein LPN  Sent: 7/13/2020   5:36 PM CDT  To: John Santoyo MD    Pt states yes just not a male.  ----- Message -----  From: John Santoyo MD  Sent: 7/12/2020  10:57 PM CDT  To: Anabella Klein LPN    Please ask patient is she would like to be seen by urology in [place]?    ----- Message -----  From: Anabella Klein LPN  Sent: 7/10/2020  11:41 AM CDT  To: oJhn Santoyo MD    Pt states her keflex may not be working for her UTI, she has been taking it for a year. May need to change this medication.    Please advise  ----- Message -----  From: Rafat Huggins  Sent: 7/10/2020  10:28 AM CDT  To: [facility] Staff    Type: Needs Medical Advice  Who Called:  Freeman  Symptoms (please be specific):  urinary incontinence, urgency, pain, chills  How long has patient had these symptoms:  3 weeks  Pharmacy name and phone #:    [facility] [place] - [address]  [address]  [place] [address]  Phone: [phone] Fax: [phone]    Best Call Back Number: [phone]  Additional Information: Please call to advise

## 2020-07-20 ENCOUNTER — HOSPITAL ENCOUNTER (OUTPATIENT)
Dept: RADIOLOGY | Facility: HOSPITAL | Age: 80
Discharge: HOME | End: 2020-07-20
Attending: FAMILY MEDICINE
Payer: MEDICARE

## 2020-07-20 DIAGNOSIS — Z78.0 ASYMPTOMATIC MENOPAUSAL STATE: ICD-10-CM

## 2020-07-20 PROCEDURE — 77080 DXA BONE DENSITY AXIAL: CPT | Mod: TC

## 2020-07-20 PROCEDURE — 77080 DEXA BONE DENSITY SPINE HIP: ICD-10-PCS | Mod: 26,,, | Performed by: RADIOLOGY

## 2020-07-20 PROCEDURE — 77080 DXA BONE DENSITY AXIAL: CPT | Mod: 26,,, | Performed by: RADIOLOGY

## 2020-07-31 ENCOUNTER — EXTERNAL CHRONIC CARE MANAGEMENT (OUTPATIENT)
Dept: PRIMARY CARE CLINIC | Facility: CLINIC | Age: 80
End: 2020-07-31
Payer: MEDICARE

## 2020-07-31 PROCEDURE — 99490 CHRNC CARE MGMT STAFF 1ST 20: CPT | Mod: S$GLB,,, | Performed by: FAMILY MEDICINE

## 2020-07-31 PROCEDURE — G2058 PR CHRON CARE MGMT, EA ADDTL 20 MINS: ICD-10-PCS | Mod: S$GLB,,, | Performed by: FAMILY MEDICINE

## 2020-07-31 PROCEDURE — G2058 CCM ADD 20MIN: HCPCS | Mod: S$GLB,,, | Performed by: FAMILY MEDICINE

## 2020-07-31 PROCEDURE — 99490 PR CHRONIC CARE MGMT, 1ST 20 MIN: ICD-10-PCS | Mod: S$GLB,,, | Performed by: FAMILY MEDICINE

## 2020-08-07 NOTE — PROGRESS NOTES
[facility] - Clinic Note    Subjective      Ms. Hook is a 79 y.o. female who presents to clinic to establish care.     Patient was previously being seen by Dr. Robertson in [place].   States that she is transitioning care here as she would like to be closer to home.    Patient has a history of a stroke, hypertension, gout, breast cancer on the left and currently in remission, and AFib.    Atrial fibrillation:  Currently on Eliquis and propafenone daily. Denies palpitations HR 68 today.    Takes nexium for GERD.    HLD: currently takes pravastatin daily.     Has a history of chronic UTIs and takes keflex for suppression.    Complains of abdominal bloating and distension.   States that it has been present for the past couple of months.   Denies any nausea, vomiting, unintentional weight loss, or abdominal pain.   Making a BM daily.    [facility] Theresa has a past medical history of A-fib, Amblyopia, Breast cancer (2005), Diarrhea (10/30/2012), Dry eye syndrome, HTN (hypertension) (8/14/2012), Interval gout (8/14/2012), Keratoconus of right eye, and Stroke (06/2016).   PSX Theresa has a past surgical history that includes Appendectomy; Bartholin gland cyst excision; spine cyst surgery; Cataract extraction (3/30/09); Cataract extraction (4/13/09); Breast lumpectomy (Left, 2005); Hysterectomy (10/22/2015); Cardiac catheterization; Colonoscopy; and Esophagogastroduodenoscopy.    Theresa's family history includes Cancer in her brother, cousin, and paternal grandmother; Diabetes in her mother and sister; Heart disease in her sister.    Theresa reports that she has never smoked. She has never used smokeless tobacco. She reports current alcohol use. She reports that she does not use drugs.   ИВАН Cardenas is allergic to collagen; erythromycin; quinolones; sulfa (sulfonamide antibiotics); sulfacetamide sodium; sulfasalazine; and tetracycline.   ZULMA Cardenas has a current medication list which includes the following  "prescription(s): acetaminophen, apixaban, aspirin, cephalexin, cetirizine, clobetasol, esomeprazole, gabapentin, mometasone 0.1%, pravastatin, propafenone, and nitrofurantoin (macrocrystal-monohydrate), and the following Facility-Administered Medications: lidocaine hcl 2%.     Review of Systems   Constitutional: Negative for activity change, appetite change, chills, fatigue and fever.   Eyes: Negative for visual disturbance.   Respiratory: Negative for cough and shortness of breath.    Cardiovascular: Negative for chest pain, palpitations and leg swelling.   Gastrointestinal: Positive for abdominal distention. Negative for abdominal pain, constipation, diarrhea, nausea and vomiting.   Skin: Negative for wound.   Neurological: Negative for dizziness and headaches.   Psychiatric/Behavioral: Negative for confusion.     Objective     /85   Pulse 68   Temp 98.5 °F (36.9 °C) (Temporal)   Resp 17   Ht 5' 6" (1.676 m)   Wt 75.5 kg (166 lb 8 oz)   BMI 26.87 kg/m²     Physical Exam   Constitutional: She appears well-developed and well-nourished.  Non-toxic appearance. She does not appear ill. No distress.   HENT:   Head: Normocephalic and atraumatic.   Right Ear: Tympanic membrane, external ear and ear canal normal.   Left Ear: Tympanic membrane, external ear and ear canal normal.   Nose: Nose normal.   Mouth/Throat: Mucous membranes are moist.   Eyes: Pupils are equal, round, and reactive to light. Conjunctivae are normal. Right eye exhibits no discharge. Left eye exhibits no discharge. No scleral icterus.   Neck: Neck supple.   Cardiovascular: Normal rate, regular rhythm, normal heart sounds and normal pulses. Exam reveals no gallop and no friction rub.   No murmur heard.  Pulmonary/Chest: Effort normal and breath sounds normal. No respiratory distress. She has no wheezes. She has no rhonchi. She has no rales.   Abdominal: Soft. Normal appearance and bowel sounds are normal. She exhibits no distension and no " mass. There is no abdominal tenderness. There is no rebound and no guarding. No hernia.   Lymphadenopathy:     She has no cervical adenopathy.   Neurological: She is alert.   Skin: Skin is warm and dry. Capillary refill takes less than 2 seconds. She is not diaphoretic.   Psychiatric: Her behavior is normal. Mood, judgment and thought content normal.   Vitals reviewed.     Assessment/Plan     Theresa was seen today for transitional care.    Diagnoses and all orders for this visit:  -New patient and new problem to me    Abdominal swelling, generalized  -     US Abdomen Complete; Future    Asymptomatic menopausal state  -     DXA Bone Density Spine And Hip; Future    Encounter to establish care with new doctor      Future Appointments   Date Time Provider Department Center   9/29/2020 11:00 AM John Santoyo MD [facility]       John Santoyo MD  Family Medicine  [facility]

## 2020-08-31 ENCOUNTER — EXTERNAL CHRONIC CARE MANAGEMENT (OUTPATIENT)
Dept: PRIMARY CARE CLINIC | Facility: CLINIC | Age: 80
End: 2020-08-31
Payer: MEDICARE

## 2020-08-31 PROCEDURE — 99490 PR CHRONIC CARE MGMT, 1ST 20 MIN: ICD-10-PCS | Mod: S$GLB,,, | Performed by: FAMILY MEDICINE

## 2020-08-31 PROCEDURE — 99490 CHRNC CARE MGMT STAFF 1ST 20: CPT | Mod: S$GLB,,, | Performed by: FAMILY MEDICINE

## 2020-09-29 ENCOUNTER — OFFICE VISIT (OUTPATIENT)
Dept: FAMILY MEDICINE | Facility: CLINIC | Age: 80
End: 2020-09-29
Payer: MEDICARE

## 2020-09-29 VITALS
RESPIRATION RATE: 15 BRPM | DIASTOLIC BLOOD PRESSURE: 75 MMHG | BODY MASS INDEX: 27.06 KG/M2 | WEIGHT: 168.38 LBS | HEIGHT: 66 IN | OXYGEN SATURATION: 96 % | SYSTOLIC BLOOD PRESSURE: 120 MMHG | HEART RATE: 73 BPM | TEMPERATURE: 98 F

## 2020-09-29 DIAGNOSIS — K21.9 GASTROESOPHAGEAL REFLUX DISEASE, UNSPECIFIED WHETHER ESOPHAGITIS PRESENT: ICD-10-CM

## 2020-09-29 DIAGNOSIS — M85.852 OSTEOPENIA OF BOTH HIPS: Primary | ICD-10-CM

## 2020-09-29 DIAGNOSIS — N39.0 CHRONIC UTI (URINARY TRACT INFECTION): ICD-10-CM

## 2020-09-29 DIAGNOSIS — E78.5 HYPERLIPIDEMIA, UNSPECIFIED HYPERLIPIDEMIA TYPE: ICD-10-CM

## 2020-09-29 DIAGNOSIS — Z91.89 AT RISK OF FRACTURE DUE TO OSTEOPOROSIS: ICD-10-CM

## 2020-09-29 DIAGNOSIS — M81.0 AT RISK OF FRACTURE DUE TO OSTEOPOROSIS: ICD-10-CM

## 2020-09-29 DIAGNOSIS — I48.0 PAROXYSMAL ATRIAL FIBRILLATION: ICD-10-CM

## 2020-09-29 DIAGNOSIS — G62.9 NEUROPATHY: ICD-10-CM

## 2020-09-29 DIAGNOSIS — M85.851 OSTEOPENIA OF BOTH HIPS: Primary | ICD-10-CM

## 2020-09-29 PROCEDURE — 99214 OFFICE O/P EST MOD 30 MIN: CPT | Mod: S$GLB,,, | Performed by: FAMILY MEDICINE

## 2020-09-29 PROCEDURE — 99214 PR OFFICE/OUTPT VISIT, EST, LEVL IV, 30-39 MIN: ICD-10-PCS | Mod: S$GLB,,, | Performed by: FAMILY MEDICINE

## 2020-09-29 RX ORDER — GABAPENTIN 300 MG/1
300 CAPSULE ORAL 3 TIMES DAILY
Qty: 90 CAPSULE | Refills: 2 | Status: SHIPPED | OUTPATIENT
Start: 2020-09-29 | End: 2020-12-06

## 2020-09-29 NOTE — PROGRESS NOTES
[facility] - Clinic Note    Subjective      Ms. Hook is a 80 y.o. female who presents to clinic for a follow up of chronic conditions.     Patient reports that all is going well.     Atrial fibrillation:  Currently on Eliquis and propafenone daily. Denies palpitations.     Takes nexium for GERD.     HLD: currently takes pravastatin daily.      Has a history of chronic UTIs and for supression was switched to macrobid from keflex since her last visit with me.     History of neuropathy in her feet bilaterally. Takes gabapentin 100mg TID with no relief.    Patient had DEXA scan done. Revealed osteopenia with a 12.7% risk of a major osteoporotic fracture and a 2.9% risk of hip fracture in the next 10 years (FRAX).    [facility] Theresa has a past medical history of A-fib, Amblyopia, Breast cancer (2005), Diarrhea (10/30/2012), Dry eye syndrome, HTN (hypertension) (8/14/2012), Interval gout (8/14/2012), Keratoconus of right eye, and Stroke (06/2016).   PS Theresa has a past surgical history that includes Appendectomy; Bartholin gland cyst excision; spine cyst surgery; Cataract extraction (3/30/09); Cataract extraction (4/13/09); Breast lumpectomy (Left, 2005); Hysterectomy (10/22/2015); Cardiac catheterization; Colonoscopy; and Esophagogastroduodenoscopy.    Theresa's family history includes Cancer in her brother, cousin, and paternal grandmother; Diabetes in her mother and sister; Heart disease in her sister.    Theresa reports that she has never smoked. She has never used smokeless tobacco. She reports current alcohol use. She reports that she does not use drugs.   ИВАН Cardenas is allergic to collagen; erythromycin; quinolones; sulfa (sulfonamide antibiotics); sulfacetamide sodium; sulfasalazine; and tetracycline.   ZULMA Cardenas has a current medication list which includes the following prescription(s): acetaminophen, apixaban, aspirin, cephalexin, cetirizine, clobetasol, esomeprazole, mometasone 0.1%,  "nitrofurantoin (macrocrystal-monohydrate), pravastatin, propafenone, and gabapentin.     Review of Systems   Constitutional: Negative for activity change, appetite change, fatigue and fever.   Eyes: Negative for visual disturbance.   Respiratory: Negative for cough and shortness of breath.    Cardiovascular: Negative for chest pain, palpitations and leg swelling.   Gastrointestinal: Negative for abdominal pain.   Neurological: Positive for numbness. Negative for dizziness and headaches.   Psychiatric/Behavioral: Negative for confusion.     Objective     /75   Pulse 73   Temp 97.6 °F (36.4 °C) (Temporal)   Resp 15   Ht 5' 6" (1.676 m)   Wt 76.4 kg (168 lb 6 oz)   SpO2 96%   BMI 27.18 kg/m²     Physical Exam   Constitutional: She appears well-developed and well-nourished.  Non-toxic appearance. She does not appear ill. No distress.   HENT:   Head: Normocephalic and atraumatic.   Eyes: Right eye exhibits no discharge. Left eye exhibits no discharge.   Neck: Neck supple.   Cardiovascular: Normal rate, regular rhythm, normal heart sounds and normal pulses. Exam reveals no gallop and no friction rub.   No murmur heard.  Pulmonary/Chest: Effort normal and breath sounds normal. No respiratory distress. She has no wheezes. She has no rhonchi. She has no rales.   Musculoskeletal:      Right lower leg: No edema.      Left lower leg: No edema.   Lymphadenopathy:     She has no cervical adenopathy.   Neurological: She is alert.   Skin: Skin is warm and dry. Capillary refill takes less than 2 seconds. She is not diaphoretic.   Psychiatric: Her behavior is normal. Mood, judgment and thought content normal.   Vitals reviewed.     Assessment/Plan     Theresa was seen today for follow-up.    Diagnoses and all orders for this visit:    Osteopenia of both hips  -     denosumab (PROLIA) injection 60 mg    Neuropathy  -     gabapentin (NEURONTIN) 300 MG capsule; Take 1 capsule (300 mg total) by mouth 3 (three) times " daily.  - Increase to 300mg and monitor for relief. Adverse effects of incerase dose explained to patient    Chronic UTI (urinary tract infection)  - continue macrobid daily.     Paroxysmal atrial fibrillation  - continue current med regimen     Gastroesophageal reflux disease, unspecified whether esophagitis present  -stable. Continue nexium    Hyperlipidemia, unspecified hyperlipidemia type  -continue pravastatin    At risk of fracture due to osteoporosis  -     denosumab (PROLIA) injection 60 mg          Future Appointments   Date Time Provider Department Center   3/30/2021  2:00 PM John Santoyo MD [facility]       John Santoyo MD  Family Medicine  [facility]

## 2020-09-30 ENCOUNTER — TELEPHONE (OUTPATIENT)
Dept: FAMILY MEDICINE | Facility: CLINIC | Age: 80
End: 2020-09-30

## 2020-09-30 ENCOUNTER — EXTERNAL CHRONIC CARE MANAGEMENT (OUTPATIENT)
Dept: PRIMARY CARE CLINIC | Facility: CLINIC | Age: 80
End: 2020-09-30
Payer: MEDICARE

## 2020-09-30 PROCEDURE — G2058 CCM ADD 20MIN: HCPCS | Mod: S$GLB,,, | Performed by: FAMILY MEDICINE

## 2020-09-30 PROCEDURE — G2058 PR CHRON CARE MGMT, EA ADDTL 20 MINS: ICD-10-PCS | Mod: S$GLB,,, | Performed by: FAMILY MEDICINE

## 2020-09-30 PROCEDURE — 99490 PR CHRONIC CARE MGMT, 1ST 20 MIN: ICD-10-PCS | Mod: S$GLB,,, | Performed by: FAMILY MEDICINE

## 2020-09-30 PROCEDURE — 99490 CHRNC CARE MGMT STAFF 1ST 20: CPT | Mod: S$GLB,,, | Performed by: FAMILY MEDICINE

## 2020-09-30 NOTE — TELEPHONE ENCOUNTER
----- Message from Anabella Klein LPN sent at 9/29/2020  4:35 PM CDT -----  Regarding: FYI  Contact: patient    ----- Message -----  From: Lance Waller  Sent: 9/29/2020   1:31 PM CDT  To: Natanael KELLEY Staff    Patient called in and stated she was just seen this morning and was supposed to call office to let them know that she is still currently taking nitrofurantoin, macrocrystal-monohydrate, (MACROBID) 100 MG capsule.     Patient call back number is [phone]

## 2020-10-26 PROBLEM — M85.851 OSTEOPENIA OF BOTH HIPS: Status: ACTIVE | Noted: 2020-10-26

## 2020-10-26 PROBLEM — Z91.89 AT RISK OF FRACTURE DUE TO OSTEOPOROSIS: Status: ACTIVE | Noted: 2020-10-26

## 2020-10-26 PROBLEM — M85.852 OSTEOPENIA OF BOTH HIPS: Status: ACTIVE | Noted: 2020-10-26

## 2020-10-26 PROBLEM — M81.0 AT RISK OF FRACTURE DUE TO OSTEOPOROSIS: Status: ACTIVE | Noted: 2020-10-26

## 2020-10-27 ENCOUNTER — INFUSION (OUTPATIENT)
Dept: INFUSION THERAPY | Facility: HOSPITAL | Age: 80
End: 2020-10-27
Payer: MEDICARE

## 2020-10-27 VITALS
HEART RATE: 69 BPM | DIASTOLIC BLOOD PRESSURE: 70 MMHG | TEMPERATURE: 98 F | SYSTOLIC BLOOD PRESSURE: 128 MMHG | RESPIRATION RATE: 18 BRPM

## 2020-10-27 DIAGNOSIS — Z91.89 AT RISK OF FRACTURE DUE TO OSTEOPOROSIS: ICD-10-CM

## 2020-10-27 DIAGNOSIS — M85.851 OSTEOPENIA OF BOTH HIPS: Primary | ICD-10-CM

## 2020-10-27 DIAGNOSIS — M81.0 AT RISK OF FRACTURE DUE TO OSTEOPOROSIS: ICD-10-CM

## 2020-10-27 DIAGNOSIS — M85.852 OSTEOPENIA OF BOTH HIPS: Primary | ICD-10-CM

## 2020-10-27 PROCEDURE — 63600175 PHARM REV CODE 636 W HCPCS: Mod: JG | Performed by: FAMILY MEDICINE

## 2020-10-27 PROCEDURE — 96372 THER/PROPH/DIAG INJ SC/IM: CPT

## 2020-10-27 RX ADMIN — DENOSUMAB 60 MG: 60 INJECTION SUBCUTANEOUS at 01:10

## 2020-10-27 NOTE — PLAN OF CARE
Problem: Adult Inpatient Plan of Care  Goal: Plan of Care Review  Flowsheets (Taken 10/27/2020 1305)  Plan of Care Reviewed With: patient    /70   Pulse 69   Temp 97.7 °F (36.5 °C)   Resp 18   Pt here today for first prolia injection. VSS. Injection given posterior left arm. Secured with bandaid. No reaction indicated. Next appt April 2021. Ambulates per self. AVS printed for review.    Patient tolerated treatment well. Patient kept for 20 minute observation period. Patient reported she felt well. Discharged and provided with instruction if delayed reaction, seek ED.

## 2020-10-31 ENCOUNTER — EXTERNAL CHRONIC CARE MANAGEMENT (OUTPATIENT)
Dept: PRIMARY CARE CLINIC | Facility: CLINIC | Age: 80
End: 2020-10-31
Payer: MEDICARE

## 2020-10-31 PROCEDURE — 99490 PR CHRONIC CARE MGMT, 1ST 20 MIN: ICD-10-PCS | Mod: S$GLB,,, | Performed by: FAMILY MEDICINE

## 2020-10-31 PROCEDURE — 99490 CHRNC CARE MGMT STAFF 1ST 20: CPT | Mod: S$GLB,,, | Performed by: FAMILY MEDICINE

## 2020-11-30 ENCOUNTER — EXTERNAL CHRONIC CARE MANAGEMENT (OUTPATIENT)
Dept: PRIMARY CARE CLINIC | Facility: CLINIC | Age: 80
End: 2020-11-30
Payer: MEDICARE

## 2020-11-30 PROCEDURE — 99490 CHRNC CARE MGMT STAFF 1ST 20: CPT | Mod: S$GLB,,, | Performed by: FAMILY MEDICINE

## 2020-11-30 PROCEDURE — 99490 PR CHRONIC CARE MGMT, 1ST 20 MIN: ICD-10-PCS | Mod: S$GLB,,, | Performed by: FAMILY MEDICINE

## 2020-12-31 ENCOUNTER — EXTERNAL CHRONIC CARE MANAGEMENT (OUTPATIENT)
Dept: PRIMARY CARE CLINIC | Facility: CLINIC | Age: 80
End: 2020-12-31
Payer: MEDICARE

## 2020-12-31 PROCEDURE — 99490 PR CHRONIC CARE MGMT, 1ST 20 MIN: ICD-10-PCS | Mod: S$GLB,,, | Performed by: FAMILY MEDICINE

## 2020-12-31 PROCEDURE — 99490 CHRNC CARE MGMT STAFF 1ST 20: CPT | Mod: S$GLB,,, | Performed by: FAMILY MEDICINE

## 2021-01-31 ENCOUNTER — EXTERNAL CHRONIC CARE MANAGEMENT (OUTPATIENT)
Dept: PRIMARY CARE CLINIC | Facility: CLINIC | Age: 81
End: 2021-01-31
Payer: MEDICARE

## 2021-01-31 PROCEDURE — 99439 CHRNC CARE MGMT STAF EA ADDL: CPT | Mod: S$GLB,,, | Performed by: FAMILY MEDICINE

## 2021-01-31 PROCEDURE — 99490 CHRNC CARE MGMT STAFF 1ST 20: CPT | Mod: S$GLB,,, | Performed by: FAMILY MEDICINE

## 2021-01-31 PROCEDURE — 99439 PR CHRONIC CARE MGMT, EA ADDTL 20 MIN: ICD-10-PCS | Mod: S$GLB,,, | Performed by: FAMILY MEDICINE

## 2021-01-31 PROCEDURE — 99490 PR CHRONIC CARE MGMT, 1ST 20 MIN: ICD-10-PCS | Mod: S$GLB,,, | Performed by: FAMILY MEDICINE

## 2021-02-23 DIAGNOSIS — Z12.31 ENCOUNTER FOR SCREENING MAMMOGRAM FOR BREAST CANCER: Primary | ICD-10-CM

## 2021-02-28 ENCOUNTER — EXTERNAL CHRONIC CARE MANAGEMENT (OUTPATIENT)
Dept: PRIMARY CARE CLINIC | Facility: CLINIC | Age: 81
End: 2021-02-28
Payer: MEDICARE

## 2021-02-28 PROCEDURE — 99439 CHRNC CARE MGMT STAF EA ADDL: CPT | Mod: S$GLB,,, | Performed by: FAMILY MEDICINE

## 2021-02-28 PROCEDURE — 99490 PR CHRONIC CARE MGMT, 1ST 20 MIN: ICD-10-PCS | Mod: S$GLB,,, | Performed by: FAMILY MEDICINE

## 2021-02-28 PROCEDURE — 99439 PR CHRONIC CARE MGMT, EA ADDTL 20 MIN: ICD-10-PCS | Mod: S$GLB,,, | Performed by: FAMILY MEDICINE

## 2021-02-28 PROCEDURE — 99490 CHRNC CARE MGMT STAFF 1ST 20: CPT | Mod: S$GLB,,, | Performed by: FAMILY MEDICINE

## 2021-03-05 ENCOUNTER — HOSPITAL ENCOUNTER (OUTPATIENT)
Dept: RADIOLOGY | Facility: HOSPITAL | Age: 81
Discharge: HOME | End: 2021-03-05
Attending: FAMILY MEDICINE
Payer: MEDICARE

## 2021-03-05 VITALS — HEIGHT: 66 IN | WEIGHT: 168.44 LBS | BODY MASS INDEX: 27.07 KG/M2

## 2021-03-05 DIAGNOSIS — Z12.31 ENCOUNTER FOR SCREENING MAMMOGRAM FOR BREAST CANCER: ICD-10-CM

## 2021-03-05 PROCEDURE — 77063 MAMMO DIGITAL SCREENING BILAT WITH TOMO: ICD-10-PCS | Mod: 26,,, | Performed by: RADIOLOGY

## 2021-03-05 PROCEDURE — 77063 BREAST TOMOSYNTHESIS BI: CPT | Mod: 26,,, | Performed by: RADIOLOGY

## 2021-03-05 PROCEDURE — 77067 MAMMO DIGITAL SCREENING BILAT WITH TOMO: ICD-10-PCS | Mod: 26,,, | Performed by: RADIOLOGY

## 2021-03-05 PROCEDURE — 77067 SCR MAMMO BI INCL CAD: CPT | Mod: 26,,, | Performed by: RADIOLOGY

## 2021-03-05 PROCEDURE — 77067 SCR MAMMO BI INCL CAD: CPT | Mod: TC

## 2021-03-30 ENCOUNTER — OFFICE VISIT (OUTPATIENT)
Dept: FAMILY MEDICINE | Facility: CLINIC | Age: 81
End: 2021-03-30
Payer: MEDICARE

## 2021-03-30 VITALS
BODY MASS INDEX: 25.79 KG/M2 | WEIGHT: 160.5 LBS | TEMPERATURE: 97 F | HEART RATE: 73 BPM | OXYGEN SATURATION: 96 % | RESPIRATION RATE: 17 BRPM | HEIGHT: 66 IN | SYSTOLIC BLOOD PRESSURE: 119 MMHG | DIASTOLIC BLOOD PRESSURE: 78 MMHG

## 2021-03-30 DIAGNOSIS — I48.0 PAROXYSMAL ATRIAL FIBRILLATION: ICD-10-CM

## 2021-03-30 DIAGNOSIS — G62.9 NEUROPATHY: ICD-10-CM

## 2021-03-30 DIAGNOSIS — Z86.2 HISTORY OF IRON DEFICIENCY ANEMIA: ICD-10-CM

## 2021-03-30 DIAGNOSIS — E78.5 HYPERLIPIDEMIA, UNSPECIFIED HYPERLIPIDEMIA TYPE: ICD-10-CM

## 2021-03-30 DIAGNOSIS — M85.852 OSTEOPENIA OF BOTH HIPS: ICD-10-CM

## 2021-03-30 DIAGNOSIS — M85.851 OSTEOPENIA OF BOTH HIPS: ICD-10-CM

## 2021-03-30 DIAGNOSIS — Z51.81 ENCOUNTER FOR MEDICATION MONITORING: ICD-10-CM

## 2021-03-30 DIAGNOSIS — N39.0 CHRONIC UTI (URINARY TRACT INFECTION): ICD-10-CM

## 2021-03-30 DIAGNOSIS — H10.13 ALLERGIC CONJUNCTIVITIS OF BOTH EYES: Primary | ICD-10-CM

## 2021-03-30 DIAGNOSIS — K21.9 GASTROESOPHAGEAL REFLUX DISEASE, UNSPECIFIED WHETHER ESOPHAGITIS PRESENT: ICD-10-CM

## 2021-03-30 DIAGNOSIS — I25.118 CORONARY ARTERY DISEASE OF NATIVE ARTERY OF NATIVE HEART WITH STABLE ANGINA PECTORIS: ICD-10-CM

## 2021-03-30 PROCEDURE — 99214 OFFICE O/P EST MOD 30 MIN: CPT | Mod: S$GLB,,, | Performed by: FAMILY MEDICINE

## 2021-03-30 PROCEDURE — 99214 PR OFFICE/OUTPT VISIT, EST, LEVL IV, 30-39 MIN: ICD-10-PCS | Mod: S$GLB,,, | Performed by: FAMILY MEDICINE

## 2021-03-30 RX ORDER — KETOTIFEN FUMARATE 0.35 MG/ML
1 SOLUTION/ DROPS OPHTHALMIC 2 TIMES DAILY
Qty: 5 ML | Refills: 5 | COMMUNITY
Start: 2021-03-30 | End: 2023-12-07 | Stop reason: ALTCHOICE

## 2021-03-30 RX ORDER — MINERAL OIL
180 ENEMA (ML) RECTAL DAILY
Qty: 90 TABLET | Refills: 3 | Status: SHIPPED | OUTPATIENT
Start: 2021-03-30 | End: 2023-12-13

## 2021-03-31 ENCOUNTER — EXTERNAL CHRONIC CARE MANAGEMENT (OUTPATIENT)
Dept: PRIMARY CARE CLINIC | Facility: CLINIC | Age: 81
End: 2021-03-31
Payer: MEDICARE

## 2021-03-31 PROCEDURE — 99490 PR CHRONIC CARE MGMT, 1ST 20 MIN: ICD-10-PCS | Mod: S$GLB,,, | Performed by: FAMILY MEDICINE

## 2021-03-31 PROCEDURE — 99490 CHRNC CARE MGMT STAFF 1ST 20: CPT | Mod: S$GLB,,, | Performed by: FAMILY MEDICINE

## 2021-03-31 RX ORDER — FLUTICASONE PROPIONATE 50 MCG
2 SPRAY, SUSPENSION (ML) NASAL DAILY
Qty: 9.9 ML | Refills: 3 | Status: SHIPPED | OUTPATIENT
Start: 2021-03-31 | End: 2023-12-13

## 2021-04-29 ENCOUNTER — INFUSION (OUTPATIENT)
Dept: INFUSION THERAPY | Facility: HOSPITAL | Age: 81
End: 2021-04-29
Payer: MEDICARE

## 2021-04-29 ENCOUNTER — LAB VISIT (OUTPATIENT)
Dept: LAB | Facility: HOSPITAL | Age: 81
End: 2021-04-29
Attending: FAMILY MEDICINE
Payer: MEDICARE

## 2021-04-29 VITALS
HEART RATE: 64 BPM | RESPIRATION RATE: 18 BRPM | SYSTOLIC BLOOD PRESSURE: 122 MMHG | OXYGEN SATURATION: 96 % | DIASTOLIC BLOOD PRESSURE: 73 MMHG

## 2021-04-29 DIAGNOSIS — Z51.81 ENCOUNTER FOR MEDICATION MONITORING: ICD-10-CM

## 2021-04-29 DIAGNOSIS — E78.5 HYPERLIPIDEMIA, UNSPECIFIED HYPERLIPIDEMIA TYPE: ICD-10-CM

## 2021-04-29 DIAGNOSIS — M85.851 OSTEOPENIA OF BOTH HIPS: Primary | ICD-10-CM

## 2021-04-29 DIAGNOSIS — Z86.2 HISTORY OF IRON DEFICIENCY ANEMIA: ICD-10-CM

## 2021-04-29 DIAGNOSIS — I25.118 CORONARY ARTERY DISEASE OF NATIVE ARTERY OF NATIVE HEART WITH STABLE ANGINA PECTORIS: ICD-10-CM

## 2021-04-29 DIAGNOSIS — M81.0 AT RISK OF FRACTURE DUE TO OSTEOPOROSIS: ICD-10-CM

## 2021-04-29 DIAGNOSIS — M85.852 OSTEOPENIA OF BOTH HIPS: Primary | ICD-10-CM

## 2021-04-29 DIAGNOSIS — Z91.89 AT RISK OF FRACTURE DUE TO OSTEOPOROSIS: ICD-10-CM

## 2021-04-29 LAB
ALBUMIN SERPL BCP-MCNC: 4.1 G/DL (ref 3.5–5.2)
ALP SERPL-CCNC: 57 U/L (ref 55–135)
ALT SERPL W/O P-5'-P-CCNC: 15 U/L (ref 10–44)
ANION GAP SERPL CALC-SCNC: 11 MMOL/L (ref 8–16)
AST SERPL-CCNC: 21 U/L (ref 10–40)
BASOPHILS # BLD AUTO: 0.08 K/UL (ref 0–0.2)
BASOPHILS NFR BLD: 1.4 % (ref 0–1.9)
BILIRUB SERPL-MCNC: 0.6 MG/DL (ref 0.1–1)
BUN SERPL-MCNC: 13 MG/DL (ref 8–23)
CALCIUM SERPL-MCNC: 8.9 MG/DL (ref 8.7–10.5)
CHLORIDE SERPL-SCNC: 104 MMOL/L (ref 95–110)
CHOLEST SERPL-MCNC: 185 MG/DL (ref 120–199)
CHOLEST/HDLC SERPL: 2.9 {RATIO} (ref 2–5)
CO2 SERPL-SCNC: 24 MMOL/L (ref 23–29)
CREAT SERPL-MCNC: 0.6 MG/DL (ref 0.5–1.4)
DIFFERENTIAL METHOD: NORMAL
EOSINOPHIL # BLD AUTO: 0.2 K/UL (ref 0–0.5)
EOSINOPHIL NFR BLD: 4.2 % (ref 0–8)
ERYTHROCYTE [DISTWIDTH] IN BLOOD BY AUTOMATED COUNT: 13.9 % (ref 11.5–14.5)
EST. GFR  (AFRICAN AMERICAN): >60 ML/MIN/1.73 M^2
EST. GFR  (NON AFRICAN AMERICAN): >60 ML/MIN/1.73 M^2
FERRITIN SERPL-MCNC: 46 NG/ML (ref 20–300)
GLUCOSE SERPL-MCNC: 105 MG/DL (ref 70–110)
HCT VFR BLD AUTO: 39.9 % (ref 37–48.5)
HDLC SERPL-MCNC: 63 MG/DL (ref 40–75)
HDLC SERPL: 34.1 % (ref 20–50)
HGB BLD-MCNC: 12.8 G/DL (ref 12–16)
IMM GRANULOCYTES # BLD AUTO: 0.01 K/UL (ref 0–0.04)
IMM GRANULOCYTES NFR BLD AUTO: 0.2 % (ref 0–0.5)
IRON SERPL-MCNC: 93 UG/DL (ref 30–160)
LDLC SERPL CALC-MCNC: 92.8 MG/DL (ref 63–159)
LYMPHOCYTES # BLD AUTO: 1.6 K/UL (ref 1–4.8)
LYMPHOCYTES NFR BLD: 27.7 % (ref 18–48)
MCH RBC QN AUTO: 30 PG (ref 27–31)
MCHC RBC AUTO-ENTMCNC: 32.1 G/DL (ref 32–36)
MCV RBC AUTO: 93 FL (ref 82–98)
MONOCYTES # BLD AUTO: 0.6 K/UL (ref 0.3–1)
MONOCYTES NFR BLD: 10.2 % (ref 4–15)
NEUTROPHILS # BLD AUTO: 3.2 K/UL (ref 1.8–7.7)
NEUTROPHILS NFR BLD: 56.3 % (ref 38–73)
NONHDLC SERPL-MCNC: 122 MG/DL
NRBC BLD-RTO: 0 /100 WBC
PLATELET # BLD AUTO: 227 K/UL (ref 150–450)
PMV BLD AUTO: 10.6 FL (ref 9.2–12.9)
POTASSIUM SERPL-SCNC: 4.1 MMOL/L (ref 3.5–5.1)
PROT SERPL-MCNC: 7.2 G/DL (ref 6–8.4)
RBC # BLD AUTO: 4.27 M/UL (ref 4–5.4)
SATURATED IRON: 24 % (ref 20–50)
SODIUM SERPL-SCNC: 139 MMOL/L (ref 136–145)
TOTAL IRON BINDING CAPACITY: 388 UG/DL (ref 250–450)
TRANSFERRIN SERPL-MCNC: 262 MG/DL (ref 200–375)
TRIGL SERPL-MCNC: 146 MG/DL (ref 30–150)
WBC # BLD AUTO: 5.66 K/UL (ref 3.9–12.7)

## 2021-04-29 PROCEDURE — 85025 COMPLETE CBC W/AUTO DIFF WBC: CPT | Performed by: FAMILY MEDICINE

## 2021-04-29 PROCEDURE — 80053 COMPREHEN METABOLIC PANEL: CPT | Performed by: FAMILY MEDICINE

## 2021-04-29 PROCEDURE — 82728 ASSAY OF FERRITIN: CPT | Performed by: FAMILY MEDICINE

## 2021-04-29 PROCEDURE — 83540 ASSAY OF IRON: CPT | Performed by: FAMILY MEDICINE

## 2021-04-29 PROCEDURE — 63600175 PHARM REV CODE 636 W HCPCS: Mod: JG | Performed by: FAMILY MEDICINE

## 2021-04-29 PROCEDURE — 96372 THER/PROPH/DIAG INJ SC/IM: CPT

## 2021-04-29 PROCEDURE — 80061 LIPID PANEL: CPT | Performed by: FAMILY MEDICINE

## 2021-04-29 PROCEDURE — 36415 COLL VENOUS BLD VENIPUNCTURE: CPT | Performed by: FAMILY MEDICINE

## 2021-04-29 RX ADMIN — DENOSUMAB 60 MG: 60 INJECTION SUBCUTANEOUS at 09:04

## 2021-04-30 ENCOUNTER — EXTERNAL CHRONIC CARE MANAGEMENT (OUTPATIENT)
Dept: PRIMARY CARE CLINIC | Facility: CLINIC | Age: 81
End: 2021-04-30
Payer: MEDICARE

## 2021-04-30 PROCEDURE — 99490 PR CHRONIC CARE MGMT, 1ST 20 MIN: ICD-10-PCS | Mod: S$GLB,,, | Performed by: FAMILY MEDICINE

## 2021-04-30 PROCEDURE — 99490 CHRNC CARE MGMT STAFF 1ST 20: CPT | Mod: S$GLB,,, | Performed by: FAMILY MEDICINE

## 2021-05-03 ENCOUNTER — TELEPHONE (OUTPATIENT)
Dept: FAMILY MEDICINE | Facility: CLINIC | Age: 81
End: 2021-05-03

## 2021-05-04 DIAGNOSIS — N39.0 CHRONIC UTI (URINARY TRACT INFECTION): ICD-10-CM

## 2021-05-04 RX ORDER — NITROFURANTOIN 25; 75 MG/1; MG/1
100 CAPSULE ORAL NIGHTLY
Qty: 30 CAPSULE | Refills: 11 | Status: SHIPPED | OUTPATIENT
Start: 2021-05-04 | End: 2023-01-10

## 2021-05-26 ENCOUNTER — TELEPHONE (OUTPATIENT)
Dept: FAMILY MEDICINE | Facility: CLINIC | Age: 81
End: 2021-05-26

## 2021-05-31 ENCOUNTER — EXTERNAL CHRONIC CARE MANAGEMENT (OUTPATIENT)
Dept: PRIMARY CARE CLINIC | Facility: CLINIC | Age: 81
End: 2021-05-31
Payer: MEDICARE

## 2021-05-31 PROCEDURE — 99490 PR CHRONIC CARE MGMT, 1ST 20 MIN: ICD-10-PCS | Mod: S$GLB,,, | Performed by: FAMILY MEDICINE

## 2021-05-31 PROCEDURE — 99490 CHRNC CARE MGMT STAFF 1ST 20: CPT | Mod: S$GLB,,, | Performed by: FAMILY MEDICINE

## 2021-06-30 ENCOUNTER — EXTERNAL CHRONIC CARE MANAGEMENT (OUTPATIENT)
Dept: PRIMARY CARE CLINIC | Facility: CLINIC | Age: 81
End: 2021-06-30
Payer: MEDICARE

## 2021-06-30 PROCEDURE — 99490 CHRNC CARE MGMT STAFF 1ST 20: CPT | Mod: S$GLB,,, | Performed by: FAMILY MEDICINE

## 2021-06-30 PROCEDURE — 99490 PR CHRONIC CARE MGMT, 1ST 20 MIN: ICD-10-PCS | Mod: S$GLB,,, | Performed by: FAMILY MEDICINE

## 2021-09-30 ENCOUNTER — OFFICE VISIT (OUTPATIENT)
Dept: FAMILY MEDICINE | Facility: CLINIC | Age: 81
End: 2021-09-30
Payer: MEDICARE

## 2021-09-30 ENCOUNTER — HOSPITAL ENCOUNTER (OUTPATIENT)
Dept: RADIOLOGY | Facility: HOSPITAL | Age: 81
Discharge: HOME | End: 2021-09-30
Attending: FAMILY MEDICINE
Payer: MEDICARE

## 2021-09-30 VITALS
OXYGEN SATURATION: 95 % | DIASTOLIC BLOOD PRESSURE: 71 MMHG | RESPIRATION RATE: 16 BRPM | HEART RATE: 65 BPM | WEIGHT: 158.63 LBS | BODY MASS INDEX: 25.49 KG/M2 | SYSTOLIC BLOOD PRESSURE: 137 MMHG | HEIGHT: 66 IN

## 2021-09-30 DIAGNOSIS — M85.851 OSTEOPENIA OF BOTH HIPS: ICD-10-CM

## 2021-09-30 DIAGNOSIS — R14.0 ABDOMINAL BLOATING: ICD-10-CM

## 2021-09-30 DIAGNOSIS — M85.852 OSTEOPENIA OF BOTH HIPS: ICD-10-CM

## 2021-09-30 DIAGNOSIS — E78.5 HYPERLIPIDEMIA, UNSPECIFIED HYPERLIPIDEMIA TYPE: ICD-10-CM

## 2021-09-30 DIAGNOSIS — R35.0 URINARY FREQUENCY: ICD-10-CM

## 2021-09-30 DIAGNOSIS — R14.0 ABDOMINAL BLOATING: Primary | ICD-10-CM

## 2021-09-30 DIAGNOSIS — I48.0 PAROXYSMAL ATRIAL FIBRILLATION: ICD-10-CM

## 2021-09-30 DIAGNOSIS — N39.0 CHRONIC UTI (URINARY TRACT INFECTION): ICD-10-CM

## 2021-09-30 LAB
BILIRUB SERPL-MCNC: NEGATIVE MG/DL
BLOOD, POC UA: NEGATIVE
GLUCOSE UR QL STRIP: NEGATIVE
KETONES UR QL STRIP: NEGATIVE
LEUKOCYTE ESTERASE URINE, POC: NEGATIVE
NITRITE, POC UA: NEGATIVE
PH, POC UA: 6.5
PROTEIN, POC: NORMAL
SPECIFIC GRAVITY, POC UA: 1.01
UROBILINOGEN, POC UA: NEGATIVE

## 2021-09-30 PROCEDURE — 99214 OFFICE O/P EST MOD 30 MIN: CPT | Mod: S$PBB,,, | Performed by: FAMILY MEDICINE

## 2021-09-30 PROCEDURE — 99214 PR OFFICE/OUTPT VISIT, EST, LEVL IV, 30-39 MIN: ICD-10-PCS | Mod: S$PBB,,, | Performed by: FAMILY MEDICINE

## 2021-09-30 PROCEDURE — 74019 RADEX ABDOMEN 2 VIEWS: CPT | Mod: 26,,, | Performed by: RADIOLOGY

## 2021-09-30 PROCEDURE — 81003 URINALYSIS AUTO W/O SCOPE: CPT | Mod: PBBFAC | Performed by: FAMILY MEDICINE

## 2021-09-30 PROCEDURE — 74019 XR ABDOMEN FLAT AND ERECT: ICD-10-PCS | Mod: 26,,, | Performed by: RADIOLOGY

## 2021-09-30 PROCEDURE — 99999 PR PBB SHADOW E&M-EST. PATIENT-LVL III: ICD-10-PCS | Mod: PBBFAC,,, | Performed by: FAMILY MEDICINE

## 2021-09-30 PROCEDURE — 99999 PR PBB SHADOW E&M-EST. PATIENT-LVL III: CPT | Mod: PBBFAC,,, | Performed by: FAMILY MEDICINE

## 2021-09-30 PROCEDURE — 99213 OFFICE O/P EST LOW 20 MIN: CPT | Mod: PBBFAC | Performed by: FAMILY MEDICINE

## 2021-09-30 PROCEDURE — 74019 RADEX ABDOMEN 2 VIEWS: CPT | Mod: TC,FY

## 2021-09-30 RX ORDER — KETOCONAZOLE 20 MG/ML
SHAMPOO, SUSPENSION TOPICAL
COMMUNITY
Start: 2021-04-20 | End: 2023-12-07 | Stop reason: ALTCHOICE

## 2021-10-31 ENCOUNTER — EXTERNAL CHRONIC CARE MANAGEMENT (OUTPATIENT)
Dept: PRIMARY CARE CLINIC | Facility: CLINIC | Age: 81
End: 2021-10-31
Payer: MEDICARE

## 2021-10-31 PROCEDURE — 99490 CHRNC CARE MGMT STAFF 1ST 20: CPT | Mod: S$PBB,,, | Performed by: FAMILY MEDICINE

## 2021-10-31 PROCEDURE — 99490 CHRNC CARE MGMT STAFF 1ST 20: CPT | Mod: PBBFAC | Performed by: FAMILY MEDICINE

## 2021-10-31 PROCEDURE — 99490 PR CHRONIC CARE MGMT, 1ST 20 MIN: ICD-10-PCS | Mod: S$PBB,,, | Performed by: FAMILY MEDICINE

## 2021-11-01 ENCOUNTER — INFUSION (OUTPATIENT)
Dept: INFUSION THERAPY | Facility: HOSPITAL | Age: 81
End: 2021-11-01
Payer: MEDICARE

## 2021-11-01 VITALS
TEMPERATURE: 97 F | OXYGEN SATURATION: 100 % | DIASTOLIC BLOOD PRESSURE: 70 MMHG | BODY MASS INDEX: 27.06 KG/M2 | HEART RATE: 59 BPM | WEIGHT: 168.38 LBS | HEIGHT: 66 IN | SYSTOLIC BLOOD PRESSURE: 145 MMHG | RESPIRATION RATE: 18 BRPM

## 2021-11-01 DIAGNOSIS — M85.851 OSTEOPENIA OF BOTH HIPS: Primary | ICD-10-CM

## 2021-11-01 DIAGNOSIS — M85.852 OSTEOPENIA OF BOTH HIPS: Primary | ICD-10-CM

## 2021-11-01 DIAGNOSIS — Z91.89 AT RISK OF FRACTURE DUE TO OSTEOPOROSIS: ICD-10-CM

## 2021-11-01 DIAGNOSIS — M81.0 AT RISK OF FRACTURE DUE TO OSTEOPOROSIS: ICD-10-CM

## 2021-11-01 PROCEDURE — 63600175 PHARM REV CODE 636 W HCPCS: Mod: JG | Performed by: FAMILY MEDICINE

## 2021-11-01 PROCEDURE — 96372 THER/PROPH/DIAG INJ SC/IM: CPT

## 2021-11-01 RX ADMIN — DENOSUMAB 60 MG: 60 INJECTION SUBCUTANEOUS at 09:11

## 2021-11-30 ENCOUNTER — EXTERNAL CHRONIC CARE MANAGEMENT (OUTPATIENT)
Dept: PRIMARY CARE CLINIC | Facility: CLINIC | Age: 81
End: 2021-11-30
Payer: MEDICARE

## 2021-11-30 PROCEDURE — 99439 PR CHRONIC CARE MGMT, EA ADDTL 20 MIN: ICD-10-PCS | Mod: S$PBB,,, | Performed by: FAMILY MEDICINE

## 2021-11-30 PROCEDURE — 99490 CHRNC CARE MGMT STAFF 1ST 20: CPT | Mod: PBBFAC | Performed by: FAMILY MEDICINE

## 2021-11-30 PROCEDURE — 99490 PR CHRONIC CARE MGMT, 1ST 20 MIN: ICD-10-PCS | Mod: S$PBB,,, | Performed by: FAMILY MEDICINE

## 2021-11-30 PROCEDURE — 99490 CHRNC CARE MGMT STAFF 1ST 20: CPT | Mod: S$PBB,,, | Performed by: FAMILY MEDICINE

## 2021-11-30 PROCEDURE — 99439 CHRNC CARE MGMT STAF EA ADDL: CPT | Mod: PBBFAC | Performed by: FAMILY MEDICINE

## 2021-11-30 PROCEDURE — 99439 CHRNC CARE MGMT STAF EA ADDL: CPT | Mod: S$PBB,,, | Performed by: FAMILY MEDICINE

## 2021-12-02 ENCOUNTER — TELEPHONE (OUTPATIENT)
Dept: OPHTHALMOLOGY | Facility: CLINIC | Age: 81
End: 2021-12-02
Payer: MEDICARE

## 2021-12-30 ENCOUNTER — TELEPHONE (OUTPATIENT)
Dept: FAMILY MEDICINE | Facility: CLINIC | Age: 81
End: 2021-12-30
Payer: MEDICARE

## 2021-12-30 DIAGNOSIS — R14.0 ABDOMINAL BLOATING: Primary | ICD-10-CM

## 2021-12-30 NOTE — TELEPHONE ENCOUNTER
Patient having bloating and swelling of abdomen. Patient states she would like a referral to a GI provider at the [place] location if possible.She saw you in Sept. Would you like her to schedule appt with you to further discuss? Please let me know. Thank you

## 2021-12-31 ENCOUNTER — EXTERNAL CHRONIC CARE MANAGEMENT (OUTPATIENT)
Dept: PRIMARY CARE CLINIC | Facility: CLINIC | Age: 81
End: 2021-12-31
Payer: MEDICARE

## 2021-12-31 PROCEDURE — 99490 PR CHRONIC CARE MGMT, 1ST 20 MIN: ICD-10-PCS | Mod: S$PBB,,, | Performed by: FAMILY MEDICINE

## 2021-12-31 PROCEDURE — 99490 CHRNC CARE MGMT STAFF 1ST 20: CPT | Mod: PBBFAC | Performed by: FAMILY MEDICINE

## 2021-12-31 PROCEDURE — 99490 CHRNC CARE MGMT STAFF 1ST 20: CPT | Mod: S$PBB,,, | Performed by: FAMILY MEDICINE

## 2022-01-03 ENCOUNTER — TELEPHONE (OUTPATIENT)
Dept: FAMILY MEDICINE | Facility: CLINIC | Age: 82
End: 2022-01-03
Payer: MEDICARE

## 2022-01-31 ENCOUNTER — EXTERNAL CHRONIC CARE MANAGEMENT (OUTPATIENT)
Dept: PRIMARY CARE CLINIC | Facility: CLINIC | Age: 82
End: 2022-01-31
Payer: MEDICARE

## 2022-01-31 DIAGNOSIS — R14.0 ABDOMINAL BLOATING: Primary | ICD-10-CM

## 2022-01-31 PROCEDURE — 99439 CHRNC CARE MGMT STAF EA ADDL: CPT | Mod: S$PBB,,, | Performed by: FAMILY MEDICINE

## 2022-01-31 PROCEDURE — 99439 CHRNC CARE MGMT STAF EA ADDL: CPT | Mod: PBBFAC,27 | Performed by: FAMILY MEDICINE

## 2022-01-31 PROCEDURE — 99490 CHRNC CARE MGMT STAFF 1ST 20: CPT | Mod: S$PBB,,, | Performed by: FAMILY MEDICINE

## 2022-01-31 PROCEDURE — 99490 CHRNC CARE MGMT STAFF 1ST 20: CPT | Mod: PBBFAC | Performed by: FAMILY MEDICINE

## 2022-01-31 PROCEDURE — 99439 PR CHRONIC CARE MGMT, EA ADDTL 20 MIN: ICD-10-PCS | Mod: S$PBB,,, | Performed by: FAMILY MEDICINE

## 2022-01-31 PROCEDURE — 99490 PR CHRONIC CARE MGMT, 1ST 20 MIN: ICD-10-PCS | Mod: S$PBB,,, | Performed by: FAMILY MEDICINE

## 2022-02-28 ENCOUNTER — EXTERNAL CHRONIC CARE MANAGEMENT (OUTPATIENT)
Dept: PRIMARY CARE CLINIC | Facility: CLINIC | Age: 82
End: 2022-02-28
Payer: MEDICARE

## 2022-02-28 PROCEDURE — 99490 PR CHRONIC CARE MGMT, 1ST 20 MIN: ICD-10-PCS | Mod: S$PBB,,, | Performed by: FAMILY MEDICINE

## 2022-02-28 PROCEDURE — 99490 CHRNC CARE MGMT STAFF 1ST 20: CPT | Mod: S$PBB,,, | Performed by: FAMILY MEDICINE

## 2022-02-28 PROCEDURE — 99439 CHRNC CARE MGMT STAF EA ADDL: CPT | Mod: PBBFAC,27 | Performed by: FAMILY MEDICINE

## 2022-02-28 PROCEDURE — 99439 PR CHRONIC CARE MGMT, EA ADDTL 20 MIN: ICD-10-PCS | Mod: S$PBB,,, | Performed by: FAMILY MEDICINE

## 2022-02-28 PROCEDURE — 99439 CHRNC CARE MGMT STAF EA ADDL: CPT | Mod: S$PBB,,, | Performed by: FAMILY MEDICINE

## 2022-02-28 PROCEDURE — 99490 CHRNC CARE MGMT STAFF 1ST 20: CPT | Mod: PBBFAC | Performed by: FAMILY MEDICINE

## 2022-03-31 ENCOUNTER — EXTERNAL CHRONIC CARE MANAGEMENT (OUTPATIENT)
Dept: PRIMARY CARE CLINIC | Facility: CLINIC | Age: 82
End: 2022-03-31
Payer: MEDICARE

## 2022-03-31 PROCEDURE — 99490 PR CHRONIC CARE MGMT, 1ST 20 MIN: ICD-10-PCS | Mod: S$PBB,,, | Performed by: FAMILY MEDICINE

## 2022-03-31 PROCEDURE — 99490 CHRNC CARE MGMT STAFF 1ST 20: CPT | Mod: PBBFAC | Performed by: FAMILY MEDICINE

## 2022-03-31 PROCEDURE — 99490 CHRNC CARE MGMT STAFF 1ST 20: CPT | Mod: S$PBB,,, | Performed by: FAMILY MEDICINE

## 2022-04-04 ENCOUNTER — OFFICE VISIT (OUTPATIENT)
Dept: GASTROENTEROLOGY | Facility: CLINIC | Age: 82
End: 2022-04-04
Payer: MEDICARE

## 2022-04-04 VITALS
RESPIRATION RATE: 18 BRPM | WEIGHT: 171 LBS | SYSTOLIC BLOOD PRESSURE: 134 MMHG | HEIGHT: 66 IN | BODY MASS INDEX: 27.48 KG/M2 | DIASTOLIC BLOOD PRESSURE: 82 MMHG

## 2022-04-04 DIAGNOSIS — K21.9 GASTROESOPHAGEAL REFLUX DISEASE, UNSPECIFIED WHETHER ESOPHAGITIS PRESENT: ICD-10-CM

## 2022-04-04 DIAGNOSIS — K57.30 DIVERTICULA OF COLON: ICD-10-CM

## 2022-04-04 DIAGNOSIS — R14.0 ABDOMINAL BLOATING: Primary | ICD-10-CM

## 2022-04-04 PROCEDURE — 99204 PR OFFICE/OUTPT VISIT, NEW, LEVL IV, 45-59 MIN: ICD-10-PCS | Mod: S$PBB,,, | Performed by: INTERNAL MEDICINE

## 2022-04-04 PROCEDURE — 99999 PR PBB SHADOW E&M-EST. PATIENT-LVL IV: ICD-10-PCS | Mod: PBBFAC,,, | Performed by: INTERNAL MEDICINE

## 2022-04-04 PROCEDURE — 99999 PR PBB SHADOW E&M-EST. PATIENT-LVL IV: CPT | Mod: PBBFAC,,, | Performed by: INTERNAL MEDICINE

## 2022-04-04 PROCEDURE — 99214 OFFICE O/P EST MOD 30 MIN: CPT | Mod: PBBFAC,PN | Performed by: INTERNAL MEDICINE

## 2022-04-04 PROCEDURE — 99204 OFFICE O/P NEW MOD 45 MIN: CPT | Mod: S$PBB,,, | Performed by: INTERNAL MEDICINE

## 2022-04-04 RX ORDER — GINSENG 100 MG
CAPSULE ORAL
COMMUNITY
End: 2023-12-13

## 2022-04-04 RX ORDER — TRIAMCINOLONE ACETONIDE 1 MG/G
CREAM TOPICAL
COMMUNITY
Start: 2022-01-17 | End: 2023-12-13

## 2022-04-04 NOTE — PROGRESS NOTES
"Subjective:       Patient ID: Theresa Hook is a 81 y.o. female.    Chief Complaint: gastroparesis    She denies GI symptoms.  She has been followed by the [facility] physicians.  She states her cardiologist referred her for upper endoscopy and colonoscopy.  She was told that she might have acid reflux but she denies hematemesis hematochezia jaundice or bleeding.  She denies significant pyrosis dyspepsia or dysphagia.  She generally has daily bowel movements.  She has a history of diverticulosis she states.  The prior records were reviewed.  She does not want further GI evaluation because she is doing extremely well.  She made a food diary and she is avoiding the offending foods particularly the fried and the fatty foods.      Allergies:  Review of patient's allergies indicates:   Allergen Reactions    Sulfa (sulfonamide antibiotics)      Other reaction(s): Rash  Other reaction(s): Dizziness    Collagen      Other reaction(s): burning  Other reaction(s): Swelling  Other reaction(s): Rash  Other reaction(s): Hallucinations    Erythromycin      Other reaction(s): Rash    Quinolones Swelling     Other reaction(s): concern b/o chronic tendonitis    Sulfacetamide sodium Other (See Comments)     "spots in front of my eyes and dizzy"    Sulfasalazine Other (See Comments)     "spots in front of my eyes and dizzy"    Tetracycline      Other reaction(s): Rash       Medications:    Current Outpatient Medications:     apixaban (ELIQUIS) 2.5 mg Tab, Take 2.5 mg by mouth 2 (two) times daily. , Disp: , Rfl:     ascorbic acid, vitamin C, (VITAMIN C) 100 MG tablet, Take 100 mg by mouth once daily., Disp: , Rfl:     clobetasol (TEMOVATE) 0.05 % external solution, Apply topically. .  AAA scalp qd - bid, Disp: , Rfl:     esomeprazole (NEXIUM) 20 MG capsule, Take 20 mg by mouth once daily., Disp: , Rfl:     fluticasone propionate (FLONASE) 50 mcg/actuation nasal spray, 2 sprays (100 mcg total) by Each Nostril route once " daily., Disp: 9.9 mL, Rfl: 3    gabapentin (NEURONTIN) 300 MG capsule, TAKE 1 CAPSULE 3 TIMES A DAY FOR NERVE PAIN , Disp: 90 capsule, Rfl: 11    grape seed extract (GRAPE SEED) 50 mg Cap, Take by mouth., Disp: , Rfl:     ketoconazole (NIZORAL) 2 % shampoo, , Disp: , Rfl:     mometasone 0.1% (ELOCON) 0.1 % cream, Apply 1 application topically Use as needed.   face, Disp: , Rfl:     nitrofurantoin, macrocrystal-monohydrate, (MACROBID) 100 MG capsule, Take 1 capsule (100 mg total) by mouth every evening., Disp: 30 capsule, Rfl: 11    pravastatin (PRAVACHOL) 40 MG tablet, 40 mg. , Disp: , Rfl:     propafenone (RHTHYMOL) 150 MG Tab, Take 150 mg by mouth every 8 (eight) hours., Disp: , Rfl:     triamcinolone acetonide 0.1% (KENALOG) 0.1 % cream, SMARTSI Application Topical 2-3 Times Daily, Disp: , Rfl:     fexofenadine (ALLEGRA) 180 MG tablet, Take 1 tablet (180 mg total) by mouth once daily., Disp: 90 tablet, Rfl: 3    ketotifen (ZADITOR) 0.025 % (0.035 %) ophthalmic solution, Place 1 drop into both eyes 2 (two) times daily., Disp: 5 mL, Rfl: 5    Past Medical History:   Diagnosis Date    A-fib     Amblyopia     right eye    Breast cancer 2005    left    Diarrhea 10/30/2012    Dry eye syndrome     HTN (hypertension) 2012    Interval gout 2012    Keratoconus of right eye     Stroke 2016    Dr. Rocha       Past Surgical History:   Procedure Laterality Date    APPENDECTOMY      BARTHOLIN GLAND CYST EXCISION      BREAST LUMPECTOMY Left 2005    CARDIAC CATHETERIZATION      CATARACT EXTRACTION  3/30/09    right eye    CATARACT EXTRACTION  09    left eye    COLONOSCOPY      ESOPHAGOGASTRODUODENOSCOPY      HYSTERECTOMY  10/22/2015    spine cyst surgery      UPPER GASTROINTESTINAL ENDOSCOPY           Review of Systems   Constitutional: Negative for appetite change, fever and unexpected weight change.   HENT: Negative for trouble swallowing.         No jaundice.   Respiratory:  Negative for cough, shortness of breath and wheezing.         She states she has never used tobacco or.  She denies dysphagia aspiration hemoptysis chronic cough chronic sputum production or dyspnea on exertion.   Cardiovascular: Negative for chest pain.        She is followed by the [facility] cardiologist.  She is on the Eliquis.  She has a history of coronary artery disease and stent.  She has a history paroxysmal atrial fibrillation but she denies exertional chest pain or rhythm disturbance at this.   Gastrointestinal:        Recent plain film of the abdomen was without obstruction.  2020 ultrasound was normal.  2012 colonoscopy was normal.  Current [facility] records were requested.  Her chart has a diagnosis of gastroparesis but she denies nausea vomiting early satiety or.  With her program she has daily.  The Nexium has her pyrosis and dyspepsia.  She is not interested in further.  She would like prior records reviewed been requested.   Musculoskeletal: Positive for arthralgias and back pain. Negative for neck pain.   Skin: Negative for pallor and rash.   Neurological: Negative for dizziness, seizures, syncope, speech difficulty, weakness and numbness.   Hematological: Negative for adenopathy.   Psychiatric/Behavioral: Negative for confusion.       Objective:      Physical Exam  Vitals reviewed.   Constitutional:       Appearance: She is well-developed.      Comments: Well-nourished well-hydrated afebrile nonicteric white female.  She is sitting in the chair in breathing normally.  She appears to be comfortable in the sitting position.  She is normocephalic.  Pupils are normal.  She appears to be oriented x3 and can relate her history and answer questions appropriately.   HENT:      Head: Normocephalic.   Eyes:      Pupils: Pupils are equal, round, and reactive to light.   Neck:      Thyroid: No thyromegaly.      Trachea: No tracheal deviation.   Cardiovascular:      Rate and Rhythm: Normal rate and regular  rhythm.      Heart sounds: Normal heart sounds.   Pulmonary:      Effort: Pulmonary effort is normal.      Breath sounds: Normal breath sounds.   Abdominal:      General: Bowel sounds are normal. There is no distension.      Palpations: Abdomen is soft. There is no mass.      Tenderness: There is no abdominal tenderness. There is no right CVA tenderness, left CVA tenderness, guarding or rebound.      Hernia: No hernia is present.   Musculoskeletal:      Cervical back: Normal range of motion and neck supple.      Comments: She can ambulate normally.  She can go from the sitting to the standing position without difficulty.   Lymphadenopathy:      Cervical: No cervical adenopathy.   Skin:     General: Skin is warm and dry.   Neurological:      Mental Status: She is alert and oriented to person, place, and time.      Cranial Nerves: No cranial nerve deficit.   Psychiatric:         Behavior: Behavior normal.           Plan:       Abdominal bloating  -     Ambulatory referral/consult to Gastroenterology    Diverticula of colon    Gastroesophageal reflux disease, unspecified whether esophagitis present      she will continue her reflux regimen and current medications vitamins and minerals..  She continues her bowel program to produce daily bowel movements.  She follows up with her other physicians.  The [facility] records were requested.  At this point she is symptom-free.  She does not want further GI evaluation.

## 2022-04-04 NOTE — PATIENT INSTRUCTIONS
She will continue current medications vitamins and minerals.  She been followed by the [facility].  She follows up with her cardiologist and has had upper endoscopy and colonoscopy which have been requested.  At this point she is symptom-free and denies GI complaints.  I will review her records.

## 2022-04-06 PROBLEM — K57.30 DIVERTICULA OF COLON: Status: ACTIVE | Noted: 2022-04-06

## 2022-04-06 PROBLEM — K21.9 GASTROESOPHAGEAL REFLUX DISEASE: Status: ACTIVE | Noted: 2022-04-06

## 2022-04-06 PROBLEM — K59.09 CONSTIPATION, CHRONIC: Status: RESOLVED | Noted: 2018-05-22 | Resolved: 2022-04-06

## 2022-04-12 ENCOUNTER — TELEPHONE (OUTPATIENT)
Dept: FAMILY MEDICINE | Facility: CLINIC | Age: 82
End: 2022-04-12
Payer: MEDICARE

## 2022-04-12 DIAGNOSIS — Z12.31 SCREENING MAMMOGRAM FOR BREAST CANCER: Primary | ICD-10-CM

## 2022-04-12 NOTE — LETTER
April 12, 2022    Theresa Hook  [address]  [place] [address]             [facility]  [address]  [place] [address]  Phone: [phone]  Fax: [phone]   [facility] is committed to your overall health and would like to ensure that you are up to date on your recommended test and/or procedures.   John Santoyo MD  has found that your chart shows you may be due for the following:    Health Maintenance Due   Topic Date Due    TETANUS VACCINE  Never done    Shingles Vaccine (1 of 2) 01/20/2015    Pneumococcal Vaccines (Age 65+) (3 - PPSV23 or PCV20) 12/13/2018    COVID-19 Vaccine (3 - Booster) 08/15/2021    Influenza Vaccine (1) Never done         If you have had any of the above done at another facility, please let us know so that we may obtain copies from that facility.      If you have a copy of these records, please provide a copy for us to scan into your chart.  You are welcome to request that the report be faxed to us at  ([phone]).     If you have an upcoming scheduled appointment for the item above, please disregard this letter.      Claudine Garcia CMA  [facility] [address] [place] [address] [phone]

## 2022-04-30 ENCOUNTER — EXTERNAL CHRONIC CARE MANAGEMENT (OUTPATIENT)
Dept: PRIMARY CARE CLINIC | Facility: CLINIC | Age: 82
End: 2022-04-30
Payer: MEDICARE

## 2022-04-30 PROCEDURE — 99439 PR CHRONIC CARE MGMT, EA ADDTL 20 MIN: ICD-10-PCS | Mod: S$PBB,,, | Performed by: FAMILY MEDICINE

## 2022-04-30 PROCEDURE — 99439 CHRNC CARE MGMT STAF EA ADDL: CPT | Mod: S$PBB,,, | Performed by: FAMILY MEDICINE

## 2022-04-30 PROCEDURE — 99490 CHRNC CARE MGMT STAFF 1ST 20: CPT | Mod: PBBFAC,25 | Performed by: FAMILY MEDICINE

## 2022-04-30 PROCEDURE — 99439 CHRNC CARE MGMT STAF EA ADDL: CPT | Mod: PBBFAC | Performed by: FAMILY MEDICINE

## 2022-04-30 PROCEDURE — 99490 CHRNC CARE MGMT STAFF 1ST 20: CPT | Mod: S$PBB,,, | Performed by: FAMILY MEDICINE

## 2022-04-30 PROCEDURE — 99490 PR CHRONIC CARE MGMT, 1ST 20 MIN: ICD-10-PCS | Mod: S$PBB,,, | Performed by: FAMILY MEDICINE

## 2022-05-09 ENCOUNTER — HOSPITAL ENCOUNTER (OUTPATIENT)
Dept: RADIOLOGY | Facility: HOSPITAL | Age: 82
Discharge: HOME | End: 2022-05-09
Attending: FAMILY MEDICINE
Payer: MEDICARE

## 2022-05-09 VITALS — WEIGHT: 156 LBS | BODY MASS INDEX: 25.07 KG/M2 | HEIGHT: 66 IN

## 2022-05-09 DIAGNOSIS — Z12.31 SCREENING MAMMOGRAM FOR BREAST CANCER: ICD-10-CM

## 2022-05-09 PROCEDURE — 77067 MAMMO DIGITAL SCREENING BILAT: ICD-10-PCS | Mod: 26,,, | Performed by: RADIOLOGY

## 2022-05-09 PROCEDURE — 77067 SCR MAMMO BI INCL CAD: CPT | Mod: 26,,, | Performed by: RADIOLOGY

## 2022-05-09 PROCEDURE — 77067 SCR MAMMO BI INCL CAD: CPT | Mod: TC

## 2022-05-11 DIAGNOSIS — I10 HTN (HYPERTENSION): ICD-10-CM

## 2022-05-16 ENCOUNTER — TELEPHONE (OUTPATIENT)
Dept: INTERNAL MEDICINE | Facility: CLINIC | Age: 82
End: 2022-05-16
Payer: MEDICARE

## 2022-05-16 NOTE — TELEPHONE ENCOUNTER
Attempted to contact Theresa Hook to discuss results.    Left voice mail to return our call at [phone] on [phone] (home).    Aaron Ramey MA

## 2022-05-16 NOTE — TELEPHONE ENCOUNTER
----- Message from John Santoyo MD sent at 5/16/2022  8:19 AM CDT -----  Your mammogram is normal. Repeat will be in 1 year.

## 2022-05-18 ENCOUNTER — INFUSION (OUTPATIENT)
Dept: INFUSION THERAPY | Facility: HOSPITAL | Age: 82
End: 2022-05-18
Payer: MEDICARE

## 2022-05-18 VITALS
TEMPERATURE: 98 F | DIASTOLIC BLOOD PRESSURE: 66 MMHG | HEIGHT: 66 IN | OXYGEN SATURATION: 97 % | RESPIRATION RATE: 20 BRPM | WEIGHT: 168.44 LBS | SYSTOLIC BLOOD PRESSURE: 119 MMHG | BODY MASS INDEX: 27.07 KG/M2 | HEART RATE: 62 BPM

## 2022-05-18 DIAGNOSIS — M81.0 AT RISK OF FRACTURE DUE TO OSTEOPOROSIS: ICD-10-CM

## 2022-05-18 DIAGNOSIS — M85.851 OSTEOPENIA OF BOTH HIPS: Primary | ICD-10-CM

## 2022-05-18 DIAGNOSIS — M85.852 OSTEOPENIA OF BOTH HIPS: Primary | ICD-10-CM

## 2022-05-18 DIAGNOSIS — Z91.89 AT RISK OF FRACTURE DUE TO OSTEOPOROSIS: ICD-10-CM

## 2022-05-18 PROCEDURE — 96372 THER/PROPH/DIAG INJ SC/IM: CPT

## 2022-05-18 PROCEDURE — 63600175 PHARM REV CODE 636 W HCPCS: Mod: JG | Performed by: FAMILY MEDICINE

## 2022-05-18 RX ADMIN — DENOSUMAB 60 MG: 60 INJECTION SUBCUTANEOUS at 09:05

## 2022-05-18 NOTE — PLAN OF CARE
"Problem: Adult Inpatient Plan of Care  Goal: Plan of Care Review  Outcome: Ongoing, Progressing  Flowsheets (Taken 5/18/2022 2193)  Plan of Care Reviewed With: patient  /66 (BP Location: Left arm, Patient Position: Sitting)   Pulse 62   Temp 98.1 °F (36.7 °C) (Oral)   Resp 20   Ht 5' 6" (1.676 m)   Wt 76.4 kg (168 lb 6.9 oz)   SpO2 97%   BMI 27.19 kg/m² Patient arrived in OPT for Prolia Injection, labs reviewed. Patient arrived per self with slow and steady gait observed, and pleasant appropriate behavior. Prolia Injection to left upper arm administered, and Pt. tolerated injection well with band aide applied to site. Patient discharged from OPT, and no acute or respiratory distress observed. AVS printed and given to patient, and all questions and concerns addressed.          "

## 2022-05-31 ENCOUNTER — EXTERNAL CHRONIC CARE MANAGEMENT (OUTPATIENT)
Dept: PRIMARY CARE CLINIC | Facility: CLINIC | Age: 82
End: 2022-05-31
Payer: MEDICARE

## 2022-05-31 PROCEDURE — 99490 CHRNC CARE MGMT STAFF 1ST 20: CPT | Mod: S$PBB,,, | Performed by: FAMILY MEDICINE

## 2022-05-31 PROCEDURE — 99490 PR CHRONIC CARE MGMT, 1ST 20 MIN: ICD-10-PCS | Mod: S$PBB,,, | Performed by: FAMILY MEDICINE

## 2022-05-31 PROCEDURE — 99490 CHRNC CARE MGMT STAFF 1ST 20: CPT | Mod: PBBFAC | Performed by: FAMILY MEDICINE

## 2022-06-30 ENCOUNTER — EXTERNAL CHRONIC CARE MANAGEMENT (OUTPATIENT)
Dept: PRIMARY CARE CLINIC | Facility: CLINIC | Age: 82
End: 2022-06-30
Payer: MEDICARE

## 2022-06-30 PROCEDURE — 99490 CHRNC CARE MGMT STAFF 1ST 20: CPT | Mod: S$PBB,,, | Performed by: FAMILY MEDICINE

## 2022-06-30 PROCEDURE — 99490 PR CHRONIC CARE MGMT, 1ST 20 MIN: ICD-10-PCS | Mod: S$PBB,,, | Performed by: FAMILY MEDICINE

## 2022-06-30 PROCEDURE — 99490 CHRNC CARE MGMT STAFF 1ST 20: CPT | Mod: PBBFAC | Performed by: FAMILY MEDICINE

## 2022-07-31 ENCOUNTER — EXTERNAL CHRONIC CARE MANAGEMENT (OUTPATIENT)
Dept: PRIMARY CARE CLINIC | Facility: CLINIC | Age: 82
End: 2022-07-31
Payer: MEDICARE

## 2022-07-31 PROCEDURE — 99490 CHRNC CARE MGMT STAFF 1ST 20: CPT | Mod: S$PBB,,, | Performed by: FAMILY MEDICINE

## 2022-07-31 PROCEDURE — 99490 CHRNC CARE MGMT STAFF 1ST 20: CPT | Mod: PBBFAC | Performed by: FAMILY MEDICINE

## 2022-07-31 PROCEDURE — 99490 PR CHRONIC CARE MGMT, 1ST 20 MIN: ICD-10-PCS | Mod: S$PBB,,, | Performed by: FAMILY MEDICINE

## 2022-08-24 ENCOUNTER — TELEPHONE (OUTPATIENT)
Dept: FAMILY MEDICINE | Facility: CLINIC | Age: 82
End: 2022-08-24
Payer: MEDICARE

## 2022-08-24 NOTE — TELEPHONE ENCOUNTER
----- Message from Yvonne oLpez sent at 8/24/2022 11:59 AM CDT -----  Contact: Self [phone]  Patient would like to get a referral.    Referral to what specialty:  Orthopedic provider    Does the patient want the referral with a specific physician:  n/a    [facility] the [facility]     Reason (be specific):  ankle swelling and bulging vein concerns     Does the patient already have the specialty clinic appointment scheduled:  No    If yes, what date is the appointment scheduled:       Is the insurance listed in Epic correct? (this is important for a referral):  Yes     Would the patient like a call back, or a response through their [facility] portal?:  call back    Comments:

## 2022-08-24 NOTE — TELEPHONE ENCOUNTER
Patient reports varicose veins in her legs. Appointment scheduled with provider. Patient voiced understanding.

## 2022-08-31 ENCOUNTER — EXTERNAL CHRONIC CARE MANAGEMENT (OUTPATIENT)
Dept: PRIMARY CARE CLINIC | Facility: CLINIC | Age: 82
End: 2022-08-31
Payer: MEDICARE

## 2022-08-31 PROCEDURE — 99490 CHRNC CARE MGMT STAFF 1ST 20: CPT | Mod: PBBFAC | Performed by: FAMILY MEDICINE

## 2022-08-31 PROCEDURE — 99490 PR CHRONIC CARE MGMT, 1ST 20 MIN: ICD-10-PCS | Mod: S$PBB,,, | Performed by: FAMILY MEDICINE

## 2022-08-31 PROCEDURE — 99490 CHRNC CARE MGMT STAFF 1ST 20: CPT | Mod: S$PBB,,, | Performed by: FAMILY MEDICINE

## 2022-09-29 ENCOUNTER — TELEPHONE (OUTPATIENT)
Dept: FAMILY MEDICINE | Facility: CLINIC | Age: 82
End: 2022-09-29
Payer: MEDICARE

## 2022-09-29 ENCOUNTER — CLINICAL SUPPORT (OUTPATIENT)
Dept: FAMILY MEDICINE | Facility: CLINIC | Age: 82
End: 2022-09-29
Payer: MEDICARE

## 2022-09-29 DIAGNOSIS — R30.0 DYSURIA: Primary | ICD-10-CM

## 2022-09-29 DIAGNOSIS — R30.0 BURNING WITH URINATION: Primary | ICD-10-CM

## 2022-09-29 LAB
BILIRUB SERPL-MCNC: NORMAL MG/DL
BLOOD URINE, POC: NORMAL
CLARITY, POC UA: NORMAL
COLOR, POC UA: YELLOW
GLUCOSE UR QL STRIP: NORMAL
KETONES UR QL STRIP: NORMAL
LEUKOCYTE ESTERASE URINE, POC: NORMAL
NITRITE, POC UA: NORMAL
PH, POC UA: 7
PROTEIN, POC: NORMAL
SPECIFIC GRAVITY, POC UA: 1.02
UROBILINOGEN, POC UA: NORMAL

## 2022-09-29 PROCEDURE — 81002 URINALYSIS NONAUTO W/O SCOPE: CPT | Mod: PBBFAC,PN

## 2022-09-29 NOTE — TELEPHONE ENCOUNTER
CALLED PT TO TELL THEM THEIR URINE WAS NEGATIVE FOR A UTI. LVM TELLING PT TO CALL BACK CONCERNING RESULTS.

## 2022-09-30 ENCOUNTER — EXTERNAL CHRONIC CARE MANAGEMENT (OUTPATIENT)
Dept: PRIMARY CARE CLINIC | Facility: CLINIC | Age: 82
End: 2022-09-30
Payer: MEDICARE

## 2022-09-30 PROCEDURE — 99490 PR CHRONIC CARE MGMT, 1ST 20 MIN: ICD-10-PCS | Mod: S$PBB,,, | Performed by: FAMILY MEDICINE

## 2022-09-30 PROCEDURE — 99490 CHRNC CARE MGMT STAFF 1ST 20: CPT | Mod: PBBFAC | Performed by: FAMILY MEDICINE

## 2022-09-30 PROCEDURE — 99490 CHRNC CARE MGMT STAFF 1ST 20: CPT | Mod: S$PBB,,, | Performed by: FAMILY MEDICINE

## 2022-10-31 ENCOUNTER — EXTERNAL CHRONIC CARE MANAGEMENT (OUTPATIENT)
Dept: PRIMARY CARE CLINIC | Facility: CLINIC | Age: 82
End: 2022-10-31
Payer: MEDICARE

## 2022-10-31 PROCEDURE — 99490 PR CHRONIC CARE MGMT, 1ST 20 MIN: ICD-10-PCS | Mod: S$PBB,,, | Performed by: FAMILY MEDICINE

## 2022-10-31 PROCEDURE — 99439 PR CHRONIC CARE MGMT, EA ADDTL 20 MIN: ICD-10-PCS | Mod: S$PBB,,, | Performed by: FAMILY MEDICINE

## 2022-10-31 PROCEDURE — 99490 CHRNC CARE MGMT STAFF 1ST 20: CPT | Mod: PBBFAC | Performed by: FAMILY MEDICINE

## 2022-10-31 PROCEDURE — 99439 CHRNC CARE MGMT STAF EA ADDL: CPT | Mod: S$PBB,,, | Performed by: FAMILY MEDICINE

## 2022-10-31 PROCEDURE — 99439 CHRNC CARE MGMT STAF EA ADDL: CPT | Mod: PBBFAC | Performed by: FAMILY MEDICINE

## 2022-10-31 PROCEDURE — 99490 CHRNC CARE MGMT STAFF 1ST 20: CPT | Mod: S$PBB,,, | Performed by: FAMILY MEDICINE

## 2022-11-15 ENCOUNTER — OFFICE VISIT (OUTPATIENT)
Dept: FAMILY MEDICINE | Facility: CLINIC | Age: 82
End: 2022-11-15
Payer: MEDICARE

## 2022-11-15 VITALS
RESPIRATION RATE: 14 BRPM | DIASTOLIC BLOOD PRESSURE: 66 MMHG | HEART RATE: 64 BPM | HEIGHT: 66 IN | BODY MASS INDEX: 24.81 KG/M2 | TEMPERATURE: 98 F | WEIGHT: 154.38 LBS | SYSTOLIC BLOOD PRESSURE: 124 MMHG | OXYGEN SATURATION: 97 %

## 2022-11-15 DIAGNOSIS — R14.0 ABDOMINAL BLOATING: Primary | ICD-10-CM

## 2022-11-15 PROCEDURE — 99214 OFFICE O/P EST MOD 30 MIN: CPT | Mod: PBBFAC | Performed by: FAMILY MEDICINE

## 2022-11-15 PROCEDURE — 99214 PR OFFICE/OUTPT VISIT, EST, LEVL IV, 30-39 MIN: ICD-10-PCS | Mod: S$PBB,,, | Performed by: FAMILY MEDICINE

## 2022-11-15 PROCEDURE — 99999 PR PBB SHADOW E&M-EST. PATIENT-LVL IV: ICD-10-PCS | Mod: PBBFAC,,, | Performed by: FAMILY MEDICINE

## 2022-11-15 PROCEDURE — 99999 PR PBB SHADOW E&M-EST. PATIENT-LVL IV: CPT | Mod: PBBFAC,,, | Performed by: FAMILY MEDICINE

## 2022-11-15 PROCEDURE — 99214 OFFICE O/P EST MOD 30 MIN: CPT | Mod: S$PBB,,, | Performed by: FAMILY MEDICINE

## 2022-11-15 RX ORDER — AMIODARONE HYDROCHLORIDE 200 MG/1
TABLET ORAL DAILY
COMMUNITY

## 2022-11-15 NOTE — PROGRESS NOTES
[facility] - Clinic Note    Subjective      Ms. Hook is a 82 y.o. female who presents to clinic for Follow-up and Abdominal Pain (Swollen abdomen)    Patient reports that she is been having swelling in her abdomen.  Reports normal bowel movements.  No swelling in her lower extremities.  No shortness of breath.  History of AFib.    [facility] Theresa has a past medical history of A-fib, Amblyopia, Breast cancer (2005), Diarrhea (10/30/2012), Dry eye syndrome, HTN (hypertension) (8/14/2012), Interval gout (8/14/2012), Keratoconus of right eye, and Stroke (06/2016).   X Theresa has a past surgical history that includes Appendectomy; Bartholin gland cyst excision; spine cyst surgery; Cataract extraction (3/30/09); Cataract extraction (4/13/09); Breast lumpectomy (Left, 2005); Hysterectomy (10/22/2015); Cardiac catheterization; Colonoscopy; Esophagogastroduodenoscopy; and Upper gastrointestinal endoscopy.    Theresa's family history includes Cancer in her brother, cousin, and paternal grandmother; Diabetes in her mother and sister; Heart disease in her sister.    Theresa reports that she has never smoked. She has never used smokeless tobacco. She reports current alcohol use. She reports that she does not use drugs.   ИВАН Cardenas is allergic to sulfa (sulfonamide antibiotics), collagen, erythromycin, quinolones, sulfacetamide sodium, sulfasalazine, and tetracycline.   ZULMA Cardenas has a current medication list which includes the following prescription(s): amiodarone, apixaban, ascorbic acid (vitamin c), clobetasol, esomeprazole, gabapentin, grape seed extract, ketoconazole, mometasone 0.1%, nitrofurantoin (macrocrystal-monohydrate), pravastatin, triamcinolone acetonide 0.1%, fexofenadine, fluticasone propionate, ketotifen, and propafenone.     Review of Systems   Constitutional:  Negative for chills and fever.   Respiratory:  Negative for shortness of breath.    Cardiovascular:  Negative for chest pain.  "  Gastrointestinal:  Positive for abdominal distention.   Objective     /66 (BP Location: Left arm, Patient Position: Sitting, BP Method: Large (Manual))   Pulse 64   Temp 97.9 °F (36.6 °C) (Temporal)   Resp 14   Ht 5' 6" (1.676 m)   Wt 70 kg (154 lb 6.4 oz)   SpO2 97%   BMI 24.92 kg/m²     Physical Exam  Vitals and nursing note reviewed.   Constitutional:       General: She is not in acute distress.     Appearance: Normal appearance. She is well-developed. She is not diaphoretic.   HENT:      Head: Normocephalic and atraumatic.      Right Ear: External ear normal.      Left Ear: External ear normal.   Eyes:      General:         Right eye: No discharge.         Left eye: No discharge.   Cardiovascular:      Rate and Rhythm: Normal rate and regular rhythm.      Heart sounds: Normal heart sounds.   Pulmonary:      Effort: Pulmonary effort is normal.      Breath sounds: Normal breath sounds. No wheezing or rales.   Abdominal:      General: There is distension.   Skin:     General: Skin is warm and dry.   Neurological:      Mental Status: She is alert and oriented to person, place, and time. Mental status is at baseline.   Psychiatric:         Mood and Affect: Mood normal.         Behavior: Behavior normal.         Thought Content: Thought content normal.         Judgment: Judgment normal.      Assessment/Plan     1. Abdominal bloating  US Abdomen Complete        New patient to me acute care visit.  Will obtain ultrasound of the abdomen to evaluate.    Future Appointments   Date Time Provider Department Center   11/16/2022  9:00 AM [facility] US3 BREAST ONLY [facility] US [facility]   11/21/2022 10:00 AM [facility] OP THERAPEUTICS 5 [facility] OPTHERA [facility]         Lino Romero MD  Family Medicine  [facility]              "

## 2022-11-16 ENCOUNTER — HOSPITAL ENCOUNTER (OUTPATIENT)
Dept: RADIOLOGY | Facility: HOSPITAL | Age: 82
Discharge: HOME | End: 2022-11-16
Attending: FAMILY MEDICINE
Payer: MEDICARE

## 2022-11-16 DIAGNOSIS — R14.0 ABDOMINAL BLOATING: ICD-10-CM

## 2022-11-16 PROCEDURE — 76700 US EXAM ABDOM COMPLETE: CPT | Mod: TC

## 2022-11-16 PROCEDURE — 76700 US ABDOMEN COMPLETE: ICD-10-PCS | Mod: 26,,, | Performed by: RADIOLOGY

## 2022-11-16 PROCEDURE — 76700 US EXAM ABDOM COMPLETE: CPT | Mod: 26,,, | Performed by: RADIOLOGY

## 2022-11-21 ENCOUNTER — INFUSION (OUTPATIENT)
Dept: INFUSION THERAPY | Facility: HOSPITAL | Age: 82
End: 2022-11-21
Attending: FAMILY MEDICINE
Payer: MEDICARE

## 2022-11-21 VITALS
HEART RATE: 64 BPM | DIASTOLIC BLOOD PRESSURE: 64 MMHG | HEIGHT: 66 IN | RESPIRATION RATE: 18 BRPM | OXYGEN SATURATION: 99 % | SYSTOLIC BLOOD PRESSURE: 134 MMHG | TEMPERATURE: 98 F | BODY MASS INDEX: 24.8 KG/M2 | WEIGHT: 154.31 LBS

## 2022-11-21 DIAGNOSIS — M85.851 OSTEOPENIA OF BOTH HIPS: ICD-10-CM

## 2022-11-21 DIAGNOSIS — M85.852 OSTEOPENIA OF BOTH HIPS: ICD-10-CM

## 2022-11-21 DIAGNOSIS — Z91.89 AT RISK OF FRACTURE DUE TO OSTEOPOROSIS: Primary | ICD-10-CM

## 2022-11-21 DIAGNOSIS — Z51.81 MEDICATION MONITORING ENCOUNTER: Primary | ICD-10-CM

## 2022-11-21 DIAGNOSIS — M81.0 AT RISK OF FRACTURE DUE TO OSTEOPOROSIS: Primary | ICD-10-CM

## 2022-11-21 PROCEDURE — 63600175 PHARM REV CODE 636 W HCPCS: Mod: JG | Performed by: FAMILY MEDICINE

## 2022-11-21 PROCEDURE — 96372 THER/PROPH/DIAG INJ SC/IM: CPT

## 2022-11-21 RX ADMIN — DENOSUMAB 60 MG: 60 INJECTION SUBCUTANEOUS at 12:11

## 2022-11-21 NOTE — PLAN OF CARE
"Problem: Adult Inpatient Plan of Care  Goal: Plan of Care Review  Outcome: Ongoing, Progressing  Flowsheets (Taken 11/21/2022 1259)  Plan of Care Reviewed With: patient  /64 (BP Location: Right arm, Patient Position: Sitting)   Pulse 64   Temp 98.3 °F (36.8 °C) (Oral)   Resp 18   Ht 5' 6" (1.676 m)   Wt 70 kg (154 lb 5.2 oz)   SpO2 99%   BMI 24.91 kg/m²  Patient arrived in OPT for Prolia Injection, labs reviewed. Patient arrived per self with slow and steady gait observed, and pleasant appropriate behavior. Prolia Injection to right upper arm administered, and Pt. tolerated injection well with band aide applied to site. Patient discharged from OPT, and no acute or respiratory distress observed. AVS printed and given to patient, and all questions and concerns addressed. RTC 5/22/23          "

## 2022-11-30 ENCOUNTER — EXTERNAL CHRONIC CARE MANAGEMENT (OUTPATIENT)
Dept: PRIMARY CARE CLINIC | Facility: CLINIC | Age: 82
End: 2022-11-30
Payer: MEDICARE

## 2022-11-30 PROCEDURE — 99489 CPLX CHRNC CARE EA ADDL 30: CPT | Mod: S$PBB,,, | Performed by: FAMILY MEDICINE

## 2022-11-30 PROCEDURE — 99487 CPLX CHRNC CARE 1ST 60 MIN: CPT | Mod: S$PBB,,, | Performed by: FAMILY MEDICINE

## 2022-11-30 PROCEDURE — 99487 CPLX CHRNC CARE 1ST 60 MIN: CPT | Mod: PBBFAC,25 | Performed by: FAMILY MEDICINE

## 2022-11-30 PROCEDURE — 99489 PR COMPLX CHRON CARE MGMT, EA ADDTL 30 MIN, PER MONTH: ICD-10-PCS | Mod: S$PBB,,, | Performed by: FAMILY MEDICINE

## 2022-11-30 PROCEDURE — 99489 CPLX CHRNC CARE EA ADDL 30: CPT | Mod: PBBFAC,27 | Performed by: FAMILY MEDICINE

## 2022-11-30 PROCEDURE — 99487 PR COMPLX CHRON CARE MGMT, 1ST HR, PER MONTH: ICD-10-PCS | Mod: S$PBB,,, | Performed by: FAMILY MEDICINE

## 2022-12-31 ENCOUNTER — EXTERNAL CHRONIC CARE MANAGEMENT (OUTPATIENT)
Dept: PRIMARY CARE CLINIC | Facility: CLINIC | Age: 82
End: 2022-12-31
Payer: MEDICARE

## 2022-12-31 PROCEDURE — 99490 CHRNC CARE MGMT STAFF 1ST 20: CPT | Mod: S$PBB,,, | Performed by: FAMILY MEDICINE

## 2022-12-31 PROCEDURE — 99439 PR CHRONIC CARE MGMT, EA ADDTL 20 MIN: ICD-10-PCS | Mod: S$PBB,,, | Performed by: FAMILY MEDICINE

## 2022-12-31 PROCEDURE — 99490 PR CHRONIC CARE MGMT, 1ST 20 MIN: ICD-10-PCS | Mod: S$PBB,,, | Performed by: FAMILY MEDICINE

## 2022-12-31 PROCEDURE — 99439 CHRNC CARE MGMT STAF EA ADDL: CPT | Mod: S$PBB,,, | Performed by: FAMILY MEDICINE

## 2022-12-31 PROCEDURE — 99490 CHRNC CARE MGMT STAFF 1ST 20: CPT | Mod: PBBFAC,25 | Performed by: FAMILY MEDICINE

## 2022-12-31 PROCEDURE — 99439 CHRNC CARE MGMT STAF EA ADDL: CPT | Mod: PBBFAC | Performed by: FAMILY MEDICINE

## 2023-01-10 ENCOUNTER — OFFICE VISIT (OUTPATIENT)
Dept: FAMILY MEDICINE | Facility: CLINIC | Age: 83
End: 2023-01-10
Payer: MEDICARE

## 2023-01-10 VITALS
RESPIRATION RATE: 15 BRPM | OXYGEN SATURATION: 98 % | BODY MASS INDEX: 25.43 KG/M2 | HEIGHT: 66 IN | WEIGHT: 158.25 LBS | HEART RATE: 68 BPM | SYSTOLIC BLOOD PRESSURE: 132 MMHG | DIASTOLIC BLOOD PRESSURE: 80 MMHG

## 2023-01-10 DIAGNOSIS — E78.5 HYPERLIPIDEMIA, UNSPECIFIED HYPERLIPIDEMIA TYPE: ICD-10-CM

## 2023-01-10 DIAGNOSIS — N39.0 CHRONIC UTI (URINARY TRACT INFECTION): ICD-10-CM

## 2023-01-10 DIAGNOSIS — G62.9 NEUROPATHY: ICD-10-CM

## 2023-01-10 DIAGNOSIS — Z74.09 IMPAIRED FUNCTIONAL MOBILITY, BALANCE, GAIT, AND ENDURANCE: Primary | ICD-10-CM

## 2023-01-10 DIAGNOSIS — I48.0 PAROXYSMAL ATRIAL FIBRILLATION: ICD-10-CM

## 2023-01-10 DIAGNOSIS — Z79.899 HIGH RISK MEDICATION USE: ICD-10-CM

## 2023-01-10 DIAGNOSIS — M85.851 OSTEOPENIA OF BOTH HIPS: ICD-10-CM

## 2023-01-10 DIAGNOSIS — M85.852 OSTEOPENIA OF BOTH HIPS: ICD-10-CM

## 2023-01-10 DIAGNOSIS — Z51.81 ENCOUNTER FOR MEDICATION MONITORING: ICD-10-CM

## 2023-01-10 DIAGNOSIS — Z85.3 PERSONAL HISTORY OF BREAST CANCER: ICD-10-CM

## 2023-01-10 DIAGNOSIS — I25.118 CORONARY ARTERY DISEASE OF NATIVE ARTERY OF NATIVE HEART WITH STABLE ANGINA PECTORIS: ICD-10-CM

## 2023-01-10 DIAGNOSIS — K21.9 GASTROESOPHAGEAL REFLUX DISEASE, UNSPECIFIED WHETHER ESOPHAGITIS PRESENT: ICD-10-CM

## 2023-01-10 PROCEDURE — 99999 PR PBB SHADOW E&M-EST. PATIENT-LVL IV: ICD-10-PCS | Mod: PBBFAC,,, | Performed by: FAMILY MEDICINE

## 2023-01-10 PROCEDURE — 99214 PR OFFICE/OUTPT VISIT, EST, LEVL IV, 30-39 MIN: ICD-10-PCS | Mod: S$GLB,,, | Performed by: FAMILY MEDICINE

## 2023-01-10 PROCEDURE — 99214 OFFICE O/P EST MOD 30 MIN: CPT | Mod: PBBFAC | Performed by: FAMILY MEDICINE

## 2023-01-10 PROCEDURE — 99214 OFFICE O/P EST MOD 30 MIN: CPT | Mod: S$GLB,,, | Performed by: FAMILY MEDICINE

## 2023-01-10 PROCEDURE — 99999 PR PBB SHADOW E&M-EST. PATIENT-LVL IV: CPT | Mod: PBBFAC,,, | Performed by: FAMILY MEDICINE

## 2023-01-10 NOTE — PROGRESS NOTES
[facility] - Clinic Note    Subjective      Ms. Hook is a 82 y.o. female who presents to clinic for a follow up of chronic conditions.      Atrial fibrillation:  Currently on Eliquis, propafenone, amiodarone daily. Denies palpitations. Followed by cardiology-last visit a few months ago     Takes nexium for GERD.     HLD: currently takes pravastatin daily.      Has a history of chronic UTIs and for suppression therapy. Takes macrobid daily.     History of neuropathy in her feet bilaterally. Takes gabapentin 300mg TID with  relief.     Patient had DEXA scan done. Revealed osteopenia with a 12.7% risk of a major osteoporotic fracture and a 2.9% risk of hip fracture in the next 10 years (FRAX). receives prolia.    She does report that she has felt that her balance seems off. She has had a few falls.   Deneis syncopal episodes or vertigo.   She reports that she has hearing loss and states that has to do with her balance. Was given hearing aids last year but was expensive. States now [facility] reports they will cover it.   Offered PT but patient declined.    [facility] Theresa has a past medical history of A-fib, Amblyopia, Breast cancer (2005), Diarrhea (10/30/2012), Dry eye syndrome, HTN (hypertension) (8/14/2012), Interval gout (8/14/2012), Keratoconus of right eye, and Stroke (06/2016).   [facility] Theresa has a past surgical history that includes Appendectomy; Bartholin gland cyst excision; spine cyst surgery; Cataract extraction (3/30/09); Cataract extraction (4/13/09); Breast lumpectomy (Left, 2005); Hysterectomy (10/22/2015); Cardiac catheterization; Colonoscopy; Esophagogastroduodenoscopy; and Upper gastrointestinal endoscopy.    Theresa's family history includes Cancer in her brother, cousin, and paternal grandmother; Diabetes in her mother and sister; Heart disease in her sister.    Theresa reports that she has never smoked. She has never used smokeless tobacco. She reports current alcohol use. She reports that she  "does not use drugs.   ИВАН Cardenas is allergic to sulfa (sulfonamide antibiotics), collagen, erythromycin, quinolones, sulfacetamide sodium, sulfasalazine, and tetracycline.   ZULMA Cardenas has a current medication list which includes the following prescription(s): amiodarone, apixaban, ascorbic acid (vitamin c), clobetasol, esomeprazole, gabapentin, grape seed extract, ketoconazole, mometasone 0.1%, pravastatin, propafenone, triamcinolone acetonide 0.1%, fexofenadine, fluticasone propionate, and ketotifen.     Review of Systems   Constitutional:  Negative for activity change, appetite change, chills, fatigue and fever.   Eyes:  Negative for visual disturbance.   Respiratory:  Negative for cough and shortness of breath.    Cardiovascular:  Negative for chest pain, palpitations and leg swelling.   Gastrointestinal:  Negative for abdominal pain, nausea and vomiting.   Skin:  Negative for wound.   Neurological:  Negative for dizziness and headaches.   Psychiatric/Behavioral:  Negative for confusion.    Objective     /80 (BP Location: Left arm, Patient Position: Sitting, BP Method: Medium (Manual))   Pulse 68   Resp 15   Ht 5' 6" (1.676 m)   Wt 71.8 kg (158 lb 4 oz)   SpO2 98%   BMI 25.54 kg/m²     Physical Exam   Constitutional: normal appearance. She appears well-developed and well-nourished.  Non-toxic appearance. No distress. She does not appear ill.   HENT:   Head: Normocephalic and atraumatic.   Eyes: Right eye exhibits no discharge. Left eye exhibits no discharge.   Cardiovascular: Normal rate, regular rhythm, normal heart sounds and normal pulses. Exam reveals no gallop and no friction rub.   No murmur heard.Pulmonary:      Effort: Pulmonary effort is normal. No respiratory distress.      Breath sounds: Normal breath sounds. No wheezing, rhonchi or rales.     Abdominal: Normal appearance.   Musculoskeletal:      Cervical back: Neck supple.   Lymphadenopathy:     She has no cervical adenopathy. "   Neurological: She is alert.   Skin: Skin is warm and dry. Capillary refill takes less than 2 seconds. She is not diaphoretic.   Psychiatric: Her behavior is normal. Mood, judgment and thought content normal.   Vitals reviewed.   Assessment/Plan     Theresa was seen today for follow-up.    Diagnoses and all orders for this visit:    Impaired functional mobility, balance, gait, and endurance    Hyperlipidemia, unspecified hyperlipidemia type  -     Lipid Panel; Future    Paroxysmal atrial fibrillation    Gastroesophageal reflux disease, unspecified whether esophagitis present    Chronic UTI (urinary tract infection)    Neuropathy    Osteopenia of both hips    Coronary artery disease of native artery of native heart with stable angina pectoris  -stable. On lipitor.     Personal history of breast cancer, 2005  -diagnosed in 2005, left breast lumpectomy x2,  lymph nodes negative, XRT, tamoxifen 5 years.    High risk medication use  -     TSH; Future    Encounter for medication monitoring  -     CBC Auto Differential; Future  -     Comprehensive metabolic panel; Future  -     TSH; Future    -chronic conditions stable. Continue current regimen.  -informed to make appt with ENT, Dr. Delacruz, for hearing aids. If she changes her mind for gait training with PT to let me know.      Follow up in about 6 months (around 7/10/2023), or if symptoms worsen or fail to improve.    Future Appointments   Date Time Provider Department Center   5/22/2023  8:00 AM [facility], LABORATORY [facility] LAB [facility]   5/22/2023  1:00 PM [facility] OP THERAPEUTICS 5 [facility] OPTHERA [facility]   7/10/2023 10:20 AM [facility]       John Santoyo MD  Family Medicine  [facility]

## 2023-01-31 ENCOUNTER — EXTERNAL CHRONIC CARE MANAGEMENT (OUTPATIENT)
Dept: PRIMARY CARE CLINIC | Facility: CLINIC | Age: 83
End: 2023-01-31
Payer: MEDICARE

## 2023-01-31 PROCEDURE — 99490 CHRNC CARE MGMT STAFF 1ST 20: CPT | Mod: PBBFAC | Performed by: FAMILY MEDICINE

## 2023-01-31 PROCEDURE — 99490 PR CHRONIC CARE MGMT, 1ST 20 MIN: ICD-10-PCS | Mod: S$PBB,,, | Performed by: FAMILY MEDICINE

## 2023-01-31 PROCEDURE — 99490 CHRNC CARE MGMT STAFF 1ST 20: CPT | Mod: S$PBB,,, | Performed by: FAMILY MEDICINE

## 2023-02-28 ENCOUNTER — EXTERNAL CHRONIC CARE MANAGEMENT (OUTPATIENT)
Dept: PRIMARY CARE CLINIC | Facility: CLINIC | Age: 83
End: 2023-02-28
Payer: MEDICARE

## 2023-02-28 PROCEDURE — 99490 CHRNC CARE MGMT STAFF 1ST 20: CPT | Mod: S$GLB,,, | Performed by: FAMILY MEDICINE

## 2023-02-28 PROCEDURE — 99490 PR CHRONIC CARE MGMT, 1ST 20 MIN: ICD-10-PCS | Mod: S$GLB,,, | Performed by: FAMILY MEDICINE

## 2023-03-31 ENCOUNTER — EXTERNAL CHRONIC CARE MANAGEMENT (OUTPATIENT)
Dept: PRIMARY CARE CLINIC | Facility: CLINIC | Age: 83
End: 2023-03-31
Payer: MEDICARE

## 2023-03-31 PROCEDURE — 99490 CHRNC CARE MGMT STAFF 1ST 20: CPT | Mod: S$GLB,,, | Performed by: FAMILY MEDICINE

## 2023-03-31 PROCEDURE — 99490 PR CHRONIC CARE MGMT, 1ST 20 MIN: ICD-10-PCS | Mod: S$GLB,,, | Performed by: FAMILY MEDICINE

## 2023-03-31 PROCEDURE — 99439 CHRNC CARE MGMT STAF EA ADDL: CPT | Mod: S$GLB,,, | Performed by: FAMILY MEDICINE

## 2023-03-31 PROCEDURE — 99439 PR CHRONIC CARE MGMT, EA ADDTL 20 MIN: ICD-10-PCS | Mod: S$GLB,,, | Performed by: FAMILY MEDICINE

## 2023-04-05 ENCOUNTER — TELEPHONE (OUTPATIENT)
Dept: FAMILY MEDICINE | Facility: CLINIC | Age: 83
End: 2023-04-05
Payer: MEDICARE

## 2023-04-05 NOTE — TELEPHONE ENCOUNTER
Attempted to contact Theresa Hook to discuss Scheduling an appointment.    Unable to leave message on phone - no voicemail or mailbox full on [phone] (home).    Haritha Lino LPN

## 2023-04-05 NOTE — TELEPHONE ENCOUNTER
----- Message from Rosa Salmon sent at 4/5/2023 12:19 PM CDT -----  Regarding: sooner appt  Name of Caller: Sapna Snider Nurse      When is the first available appointment? May 3rd       Symptoms: hard swollen lump in stomach       Best Call Back Number: [phone]        Additional Information:

## 2023-04-06 ENCOUNTER — TELEPHONE (OUTPATIENT)
Dept: FAMILY MEDICINE | Facility: CLINIC | Age: 83
End: 2023-04-06
Payer: MEDICARE

## 2023-04-06 NOTE — TELEPHONE ENCOUNTER
Attempted to contact Theresa Hook to discuss Scheduling an appointment.    Unable to leave message on phone - no voicemail or mailbox full on [phone] (home).    Haritha Lino LPN

## 2023-04-06 NOTE — TELEPHONE ENCOUNTER
----- Message from Power Fonseca, Patient Care Assistant sent at 4/6/2023  8:53 AM CDT -----  Contact: Pt  Type: Return Call    Who Called: Pt  Who Left Message for Pt: Haritha  Does the patient know what this is regarding: Yes/Sooner Appt  Best Call Back Number: [phone]  Thank you~

## 2023-04-30 ENCOUNTER — EXTERNAL CHRONIC CARE MANAGEMENT (OUTPATIENT)
Dept: PRIMARY CARE CLINIC | Facility: CLINIC | Age: 83
End: 2023-04-30
Payer: MEDICARE

## 2023-04-30 PROCEDURE — 99487 CPLX CHRNC CARE 1ST 60 MIN: CPT | Mod: S$GLB,,, | Performed by: FAMILY MEDICINE

## 2023-04-30 PROCEDURE — 99489 PR COMPLX CHRON CARE MGMT, EA ADDTL 30 MIN, PER MONTH: ICD-10-PCS | Mod: S$GLB,,, | Performed by: FAMILY MEDICINE

## 2023-04-30 PROCEDURE — 99487 PR COMPLX CHRON CARE MGMT, 1ST HR, PER MONTH: ICD-10-PCS | Mod: S$GLB,,, | Performed by: FAMILY MEDICINE

## 2023-04-30 PROCEDURE — 99489 CPLX CHRNC CARE EA ADDL 30: CPT | Mod: S$GLB,,, | Performed by: FAMILY MEDICINE

## 2023-05-03 ENCOUNTER — TELEPHONE (OUTPATIENT)
Dept: FAMILY MEDICINE | Facility: CLINIC | Age: 83
End: 2023-05-03
Payer: MEDICARE

## 2023-05-03 NOTE — TELEPHONE ENCOUNTER
Spoke with patient and her daughter lakeisha. Pt does not have c/o lump in stomach at this time. Pt requested appt be canceled and will call back to schedule if needed. Verbalized understanding.

## 2023-05-03 NOTE — TELEPHONE ENCOUNTER
----- Message from Gaby Gilman sent at 5/3/2023  8:27 AM CDT -----  Regarding: Needs Medical Advice before appointment this afternoon  Contact: Patient's daughterLiz at [phone]  Type: Needs Medical Advice  Who Called:  Patient's daughterLiz at [phone]    Additional Information: Patient's daughter would like a call back to discuss today's appointment. She was not aware of it. Please call and advise. Thank you

## 2023-05-03 NOTE — TELEPHONE ENCOUNTER
----- Message from Libertad Chaudhary sent at 5/3/2023  9:16 AM CDT -----  Type:  Patient Returning Call    Who Called:  pt  Who Left Message for Patient:  gertrudis  Does the patient know what this is regarding?:  yes   Best Call Back Number:  [phone]    Additional Information:  please advise

## 2023-05-03 NOTE — TELEPHONE ENCOUNTER
Attempted to contact patient daughter. No answer. LVM  Unable to discuss appt with Liz as she is not on patient chart

## 2023-05-22 ENCOUNTER — INFUSION (OUTPATIENT)
Dept: INFUSION THERAPY | Facility: HOSPITAL | Age: 83
End: 2023-05-22
Attending: FAMILY MEDICINE
Payer: MEDICARE

## 2023-05-22 VITALS
SYSTOLIC BLOOD PRESSURE: 138 MMHG | DIASTOLIC BLOOD PRESSURE: 64 MMHG | TEMPERATURE: 98 F | RESPIRATION RATE: 18 BRPM | HEART RATE: 65 BPM

## 2023-05-22 DIAGNOSIS — Z91.89 AT RISK OF FRACTURE DUE TO OSTEOPOROSIS: ICD-10-CM

## 2023-05-22 DIAGNOSIS — M85.852 OSTEOPENIA OF BOTH HIPS: Primary | ICD-10-CM

## 2023-05-22 DIAGNOSIS — M81.0 AT RISK OF FRACTURE DUE TO OSTEOPOROSIS: ICD-10-CM

## 2023-05-22 DIAGNOSIS — M85.851 OSTEOPENIA OF BOTH HIPS: Primary | ICD-10-CM

## 2023-05-22 PROCEDURE — 96372 THER/PROPH/DIAG INJ SC/IM: CPT

## 2023-05-22 PROCEDURE — 63600175 PHARM REV CODE 636 W HCPCS: Mod: JZ,JG | Performed by: FAMILY MEDICINE

## 2023-05-22 RX ADMIN — DENOSUMAB 60 MG: 60 INJECTION SUBCUTANEOUS at 11:05

## 2023-05-22 NOTE — PLAN OF CARE
Pleasant alert and oriented patient ambulated to clinic for prolia injection - pt tolerated well - discharged home with no concerns - pt to RTC in 6 months

## 2023-05-26 ENCOUNTER — TELEPHONE (OUTPATIENT)
Dept: FAMILY MEDICINE | Facility: CLINIC | Age: 83
End: 2023-05-26
Payer: MEDICARE

## 2023-05-26 NOTE — TELEPHONE ENCOUNTER
----- Message from Pan Walker sent at 5/26/2023  1:49 PM CDT -----  Contact: Yvonne  Type: Requesting to speak with nurse        Who Called: Yvonne ([facility] management)   Regarding: feeling tired for over a few weeks. Denies chest pain and dizziness. Would provider like to she her/  Would the patient rather a call back or a response via [employer]? Call back  Best Call Back Number: [phone] ext:[phone] or call the patient [phone]  Additional Information:

## 2023-05-26 NOTE — TELEPHONE ENCOUNTER
Attempted to contact Theresa Hook to discuss Scheduling an appointment.    Unable to leave message on phone - no voicemail or mailbox full on [phone] (home).    Haritha Lino LPN

## 2023-05-31 ENCOUNTER — EXTERNAL CHRONIC CARE MANAGEMENT (OUTPATIENT)
Dept: PRIMARY CARE CLINIC | Facility: CLINIC | Age: 83
End: 2023-05-31
Payer: MEDICARE

## 2023-05-31 PROCEDURE — 99490 PR CHRONIC CARE MGMT, 1ST 20 MIN: ICD-10-PCS | Mod: S$GLB,,, | Performed by: FAMILY MEDICINE

## 2023-05-31 PROCEDURE — 99439 PR CHRONIC CARE MGMT, EA ADDTL 20 MIN: ICD-10-PCS | Mod: S$GLB,,, | Performed by: FAMILY MEDICINE

## 2023-05-31 PROCEDURE — 99439 CHRNC CARE MGMT STAF EA ADDL: CPT | Mod: S$GLB,,, | Performed by: FAMILY MEDICINE

## 2023-05-31 PROCEDURE — 99490 CHRNC CARE MGMT STAFF 1ST 20: CPT | Mod: S$GLB,,, | Performed by: FAMILY MEDICINE

## 2023-06-01 ENCOUNTER — OFFICE VISIT (OUTPATIENT)
Dept: URGENT CARE | Facility: CLINIC | Age: 83
End: 2023-06-01
Payer: MEDICARE

## 2023-06-01 ENCOUNTER — NURSE TRIAGE (OUTPATIENT)
Dept: ADMINISTRATIVE | Facility: CLINIC | Age: 83
End: 2023-06-01
Payer: MEDICARE

## 2023-06-01 VITALS
BODY MASS INDEX: 25.39 KG/M2 | OXYGEN SATURATION: 98 % | HEIGHT: 66 IN | HEART RATE: 66 BPM | RESPIRATION RATE: 18 BRPM | TEMPERATURE: 98 F | DIASTOLIC BLOOD PRESSURE: 72 MMHG | SYSTOLIC BLOOD PRESSURE: 138 MMHG | WEIGHT: 158 LBS

## 2023-06-01 DIAGNOSIS — R42 DIZZY: Primary | ICD-10-CM

## 2023-06-01 LAB
BILIRUB UR QL STRIP: NEGATIVE
GLUCOSE SERPL-MCNC: 105 MG/DL (ref 70–110)
GLUCOSE UR QL STRIP: NEGATIVE
KETONES UR QL STRIP: NEGATIVE
LEUKOCYTE ESTERASE UR QL STRIP: NEGATIVE
PH, POC UA: 7
POC BLOOD, URINE: NEGATIVE
POC NITRATES, URINE: NEGATIVE
PROT UR QL STRIP: NEGATIVE
SP GR UR STRIP: 1.01 (ref 1–1.03)
UROBILINOGEN UR STRIP-ACNC: 1 (ref 0.1–1.1)

## 2023-06-01 PROCEDURE — 99213 PR OFFICE/OUTPT VISIT, EST, LEVL III, 20-29 MIN: ICD-10-PCS | Mod: ,,, | Performed by: NURSE PRACTITIONER

## 2023-06-01 PROCEDURE — 82962 GLUCOSE BLOOD TEST: CPT | Mod: ,,, | Performed by: NURSE PRACTITIONER

## 2023-06-01 PROCEDURE — 81003 POCT URINALYSIS, DIPSTICK, AUTOMATED, W/O SCOPE: ICD-10-PCS | Mod: QW,,, | Performed by: NURSE PRACTITIONER

## 2023-06-01 PROCEDURE — 93005 EKG 12-LEAD: ICD-10-PCS | Mod: ,,, | Performed by: NURSE PRACTITIONER

## 2023-06-01 PROCEDURE — 93010 ELECTROCARDIOGRAM REPORT: CPT | Mod: ,,, | Performed by: INTERNAL MEDICINE

## 2023-06-01 PROCEDURE — 81003 URINALYSIS AUTO W/O SCOPE: CPT | Mod: QW,,, | Performed by: NURSE PRACTITIONER

## 2023-06-01 PROCEDURE — 93010 EKG 12-LEAD: ICD-10-PCS | Mod: ,,, | Performed by: INTERNAL MEDICINE

## 2023-06-01 PROCEDURE — 93005 ELECTROCARDIOGRAM TRACING: CPT | Mod: ,,, | Performed by: NURSE PRACTITIONER

## 2023-06-01 PROCEDURE — 82962 POCT GLUCOSE, HAND-HELD DEVICE: ICD-10-PCS | Mod: ,,, | Performed by: NURSE PRACTITIONER

## 2023-06-01 PROCEDURE — 99213 OFFICE O/P EST LOW 20 MIN: CPT | Mod: ,,, | Performed by: NURSE PRACTITIONER

## 2023-06-01 NOTE — TELEPHONE ENCOUNTER
Incoming call received from [facility] Care Management Staff, Yvonne Cordova LPN regarding patient c/o excessive tiredness and discolored feet.     Triage RN contacted patient to discuss current symptoms. Patient states c/o excessive tiredness, falling asleep while driving and possible onset of Dementia. Patient denies feet concerns at this time.    Care Advice given to Go to an Urgent [facility] for evaluation/treatment. Patient states understanding of care advice.       Reason for Disposition   MODERATE weakness (i.e., interferes with work, school, normal activities) and cause unknown  (Exceptions: Weakness with acute minor illness, or weakness from poor fluid intake.)    Additional Information   Negative: SEVERE difficulty breathing (e.g., struggling for each breath, speaks in single words)   Negative: Shock suspected (e.g., cold/pale/clammy skin, too weak to stand, low BP, rapid pulse)   Negative: Difficult to awaken or acting confused (e.g., disoriented, slurred speech)   Negative: Fainted > 15 minutes ago and still feels too weak or dizzy to stand   Negative: SEVERE weakness (i.e., unable to walk or barely able to walk, requires support) and new-onset or worsening   Negative: Sounds like a life-threatening emergency to the triager   Negative: Weakness of the face, arm or leg on one side of the body   Negative: Has diabetes mellitus and weakness from low blood sugar (i.e., < 60 mg/dL or 3.5 mmol/L)   Negative: Recent heat exposure, suspected cause of weakness   Negative: Vomiting is main symptom   Negative: Diarrhea is main symptom   Negative: Difficulty breathing   Negative: Heart beating < 50 beats per minute OR > 140 beats per minute   Negative: Extra heartbeats OR irregular heart beating (i.e., 'palpitations')   Negative: Follows bleeding (e.g., from vomiting, rectum, vagina)  (Exception: Small transient weakness from sight of a small amount blood.)   Negative: Bloody, black, or tarry bowel  movements  (Exception: Chronic-unchanged black-grey bowel movements and is taking iron pills or Pepto-Bismol.)   Negative: MODERATE weakness from poor fluid intake with no improvement after 2 hours of rest and fluids   Negative: Drinking very little and dehydration suspected (e.g., no urine > 12 hours, very dry mouth, very lightheaded)   Negative: Patient sounds very sick or weak to the triager    Protocols used: Weakness (Generalized) and Fatigue-A-OH

## 2023-06-01 NOTE — PATIENT INSTRUCTIONS
You must understand that you've received an Urgent Care treatment only and that you may be released before all your medical problems are known or treated. You, the patient, will arrange for follow up care as instructed.  Follow up with your PCP or specialty clinic as directed in the next 1-2 weeks if not improved or as needed.  You can call [phone] to schedule an appointment with the appropriate provider.  If your condition worsens we recommend that you receive another evaluation at the emergency room immediately or contact your primary medical clinics after hours call service to discuss your concerns.  Please return here or go to the Emergency Department for any concerns or worsening of condition.  Please if you smoke please consider quitting. [facility] Smoke cessation hotline number is [phone], available at this number is free counseling and medications to live a healthier life!       If you were prescribed a narcotic or controlled medication, do not drive or operate heavy equipment or machinery while taking these medications.    If you were not prescribed an antibiotic and your not better please return for a recheck. Antibiotic therapy is not always indicated initially.   Please attempt over the counter medications, give it time and try Echinacea, Zinc and Vitamin C to fight common colds and virus.

## 2023-06-01 NOTE — PROGRESS NOTES
"Subjective:       Patient ID: Theresa Hook is a 82 y.o. female.    Vitals:  height is 5' 6" (1.676 m) and weight is 71.7 kg (158 lb). Her oral temperature is 98.1 °F (36.7 °C). Her blood pressure is 138/72 and her pulse is 66. Her respiration is 18 and oxygen saturation is 98%.     Chief Complaint: Dizziness    This is a 82 y.o. female who presents today with a chief complaint of  Patient presents with:  Dizziness x a few weeks. Patient stated that she is off balance, and has been for weeks. She's also lightheaded and weak.         Dizziness:   Chronicity:  New  Onset:  1 to 4 weeks ago  Progression since onset:  Unchanged  Pain Scale:  0/10  Severity:  Mild  Dizziness characteristics:  Off-balance   Associated symptoms: weakness and light-headedness.    Neurological:  Positive for dizziness and light-headedness.         Objective:      Physical Exam   Constitutional: She is oriented to person, place, and time. She appears well-developed. She is cooperative.  Non-toxic appearance. She does not appear ill. No distress.   HENT:   Head: Normocephalic and atraumatic.   Ears:   Right Ear: Hearing, external ear and ear canal normal. Tympanic membrane is injected and erythematous.   Left Ear: Hearing, external ear and ear canal normal. Tympanic membrane is injected and erythematous.   Nose: Rhinorrhea present. No mucosal edema or nasal deformity. No epistaxis. Right sinus exhibits no maxillary sinus tenderness and no frontal sinus tenderness. Left sinus exhibits no maxillary sinus tenderness and no frontal sinus tenderness.   Mouth/Throat: Uvula is midline and mucous membranes are normal. No trismus in the jaw. Normal dentition. No uvula swelling. Posterior oropharyngeal erythema present. No oropharyngeal exudate or posterior oropharyngeal edema.   Eyes: Conjunctivae and lids are normal. No scleral icterus.   Neck: Trachea normal and phonation normal. Neck supple. No edema present. No erythema present. No neck " "rigidity present.   Cardiovascular: Normal rate, regular rhythm, normal heart sounds and normal pulses.   Pulmonary/Chest: Effort normal and breath sounds normal. No respiratory distress. She has no decreased breath sounds. She has no rhonchi.   Abdominal: Normal appearance.   Musculoskeletal: Normal range of motion.         General: No deformity. Normal range of motion.   Neurological: She is alert and oriented to person, place, and time. She exhibits normal muscle tone. Coordination normal.   Skin: Skin is warm, dry, intact, not diaphoretic and not pale.   Psychiatric: Her speech is normal and behavior is normal. Judgment and thought content normal.   Nursing note and vitals reviewed.      Past medical history and current medications reviewed.  Just had "work done on ears"   Hearing aids which she does not like and ears cleared out, possibility of acute vertigo     Pt is alert oriented in room, no neuro deficits AnO3,   Was on phone in waiting room and talking to caregiver in room.     Results for orders placed or performed in visit on 06/01/23   POCT Urinalysis, Dipstick, Automated, W/O Scope   Result Value Ref Range    POC Blood, Urine Negative Negative    POC Bilirubin, Urine Negative Negative    POC Urobilinogen, Urine 1.0 0.1 - 1.1    POC Ketones, Urine Negative Negative    POC Protein, Urine Negative Negative    POC Nitrates, Urine Negative Negative    POC Glucose, Urine Negative Negative    pH, UA 7.0     POC Specific Gravity, Urine 1.015 1.003 - 1.029    POC Leukocytes, Urine Negative Negative   POCT Glucose, Hand-Held Device   Result Value Ref Range    POC Glucose 105 70 - 110 MG/DL       EKG nothing acute,   Assessment:           1. Dizzy              Plan:         Dizzy  -     POCT Urinalysis, Dipstick, Automated, W/O Scope  -     IN OFFICE EKG 12-LEAD (to Muse)  -     POCT Glucose, Hand-Held Device             Patient Instructions     You must understand that you've received an Urgent Care treatment " only and that you may be released before all your medical problems are known or treated. You, the patient, will arrange for follow up care as instructed.  Follow up with your PCP or specialty clinic as directed in the next 1-2 weeks if not improved or as needed.  You can call [phone] to schedule an appointment with the appropriate provider.  If your condition worsens we recommend that you receive another evaluation at the emergency room immediately or contact your primary medical clinics after hours call service to discuss your concerns.  Please return here or go to the Emergency Department for any concerns or worsening of condition.  Please if you smoke please consider quitting. [facility] cessation hotline number is [phone], available at this number is free counseling and medications to live a healthier life!       If you were prescribed a narcotic or controlled medication, do not drive or operate heavy equipment or machinery while taking these medications.    If you were not prescribed an antibiotic and your not better please return for a recheck. Antibiotic therapy is not always indicated initially.   Please attempt over the counter medications, give it time and try Echinacea, Zinc and Vitamin C to fight common colds and virus.

## 2023-06-30 ENCOUNTER — EXTERNAL CHRONIC CARE MANAGEMENT (OUTPATIENT)
Dept: PRIMARY CARE CLINIC | Facility: CLINIC | Age: 83
End: 2023-06-30
Payer: MEDICARE

## 2023-06-30 PROCEDURE — 99490 CHRNC CARE MGMT STAFF 1ST 20: CPT | Mod: S$GLB,,, | Performed by: FAMILY MEDICINE

## 2023-06-30 PROCEDURE — 99490 PR CHRONIC CARE MGMT, 1ST 20 MIN: ICD-10-PCS | Mod: S$GLB,,, | Performed by: FAMILY MEDICINE

## 2023-06-30 PROCEDURE — 99439 CHRNC CARE MGMT STAF EA ADDL: CPT | Mod: S$GLB,,, | Performed by: FAMILY MEDICINE

## 2023-06-30 PROCEDURE — 99439 PR CHRONIC CARE MGMT, EA ADDTL 20 MIN: ICD-10-PCS | Mod: S$GLB,,, | Performed by: FAMILY MEDICINE

## 2023-07-03 ENCOUNTER — TELEPHONE (OUTPATIENT)
Dept: FAMILY MEDICINE | Facility: CLINIC | Age: 83
End: 2023-07-03
Payer: MEDICARE

## 2023-07-03 NOTE — TELEPHONE ENCOUNTER
----- Message from Peggy Petit sent at 7/3/2023  2:28 PM CDT -----  Regarding: Advice  Contact: [phone]  Patient is calling because she is having shakiness and was given biolyte. Please contact pt

## 2023-07-03 NOTE — TELEPHONE ENCOUNTER
Called and spoke with patient.  States she spoke with someone already.  Instructed to increase her water intake.  If condition worsens to go to ER for evaluation and treatment/

## 2023-07-10 ENCOUNTER — OFFICE VISIT (OUTPATIENT)
Dept: FAMILY MEDICINE | Facility: CLINIC | Age: 83
End: 2023-07-10
Payer: MEDICARE

## 2023-07-10 VITALS
HEART RATE: 75 BPM | SYSTOLIC BLOOD PRESSURE: 130 MMHG | BODY MASS INDEX: 26.68 KG/M2 | HEIGHT: 66 IN | RESPIRATION RATE: 16 BRPM | WEIGHT: 166 LBS | DIASTOLIC BLOOD PRESSURE: 74 MMHG

## 2023-07-10 DIAGNOSIS — I48.0 PAROXYSMAL ATRIAL FIBRILLATION: ICD-10-CM

## 2023-07-10 DIAGNOSIS — G62.9 NEUROPATHY: ICD-10-CM

## 2023-07-10 DIAGNOSIS — G47.8 UNREFRESHED BY SLEEP: ICD-10-CM

## 2023-07-10 DIAGNOSIS — R41.3 MEMORY LOSS: ICD-10-CM

## 2023-07-10 DIAGNOSIS — K21.9 GASTROESOPHAGEAL REFLUX DISEASE, UNSPECIFIED WHETHER ESOPHAGITIS PRESENT: ICD-10-CM

## 2023-07-10 DIAGNOSIS — R06.83 SNORES: ICD-10-CM

## 2023-07-10 DIAGNOSIS — G47.19 EXCESSIVE DAYTIME SLEEPINESS: ICD-10-CM

## 2023-07-10 DIAGNOSIS — E78.5 HYPERLIPIDEMIA, UNSPECIFIED HYPERLIPIDEMIA TYPE: ICD-10-CM

## 2023-07-10 DIAGNOSIS — N39.0 CHRONIC UTI (URINARY TRACT INFECTION): ICD-10-CM

## 2023-07-10 DIAGNOSIS — M85.89 OSTEOPENIA OF MULTIPLE SITES: ICD-10-CM

## 2023-07-10 DIAGNOSIS — Z85.3 PERSONAL HISTORY OF BREAST CANCER: ICD-10-CM

## 2023-07-10 DIAGNOSIS — I25.118 CORONARY ARTERY DISEASE OF NATIVE ARTERY OF NATIVE HEART WITH STABLE ANGINA PECTORIS: ICD-10-CM

## 2023-07-10 DIAGNOSIS — R40.0 UNCONTROLLED EXCESSIVE SLEEPINESS WHILE DRIVING: Primary | ICD-10-CM

## 2023-07-10 DIAGNOSIS — Z12.31 SCREENING MAMMOGRAM FOR BREAST CANCER: ICD-10-CM

## 2023-07-10 PROCEDURE — 3075F SYST BP GE 130 - 139MM HG: CPT | Mod: CPTII,S$GLB,, | Performed by: FAMILY MEDICINE

## 2023-07-10 PROCEDURE — 3288F PR FALLS RISK ASSESSMENT DOCUMENTED: ICD-10-PCS | Mod: CPTII,S$GLB,, | Performed by: FAMILY MEDICINE

## 2023-07-10 PROCEDURE — 99999 PR PBB SHADOW E&M-EST. PATIENT-LVL IV: ICD-10-PCS | Mod: PBBFAC,,, | Performed by: FAMILY MEDICINE

## 2023-07-10 PROCEDURE — 1101F PR PT FALLS ASSESS DOC 0-1 FALLS W/OUT INJ PAST YR: ICD-10-PCS | Mod: CPTII,S$GLB,, | Performed by: FAMILY MEDICINE

## 2023-07-10 PROCEDURE — 3075F PR MOST RECENT SYSTOLIC BLOOD PRESS GE 130-139MM HG: ICD-10-PCS | Mod: CPTII,S$GLB,, | Performed by: FAMILY MEDICINE

## 2023-07-10 PROCEDURE — 1159F MED LIST DOCD IN RCRD: CPT | Mod: CPTII,S$GLB,, | Performed by: FAMILY MEDICINE

## 2023-07-10 PROCEDURE — 3078F DIAST BP <80 MM HG: CPT | Mod: CPTII,S$GLB,, | Performed by: FAMILY MEDICINE

## 2023-07-10 PROCEDURE — 1125F PR PAIN SEVERITY QUANTIFIED, PAIN PRESENT: ICD-10-PCS | Mod: CPTII,S$GLB,, | Performed by: FAMILY MEDICINE

## 2023-07-10 PROCEDURE — 99214 OFFICE O/P EST MOD 30 MIN: CPT | Mod: S$GLB,,, | Performed by: FAMILY MEDICINE

## 2023-07-10 PROCEDURE — 1125F AMNT PAIN NOTED PAIN PRSNT: CPT | Mod: CPTII,S$GLB,, | Performed by: FAMILY MEDICINE

## 2023-07-10 PROCEDURE — 3078F PR MOST RECENT DIASTOLIC BLOOD PRESSURE < 80 MM HG: ICD-10-PCS | Mod: CPTII,S$GLB,, | Performed by: FAMILY MEDICINE

## 2023-07-10 PROCEDURE — 3288F FALL RISK ASSESSMENT DOCD: CPT | Mod: CPTII,S$GLB,, | Performed by: FAMILY MEDICINE

## 2023-07-10 PROCEDURE — 1101F PT FALLS ASSESS-DOCD LE1/YR: CPT | Mod: CPTII,S$GLB,, | Performed by: FAMILY MEDICINE

## 2023-07-10 PROCEDURE — 1159F PR MEDICATION LIST DOCUMENTED IN MEDICAL RECORD: ICD-10-PCS | Mod: CPTII,S$GLB,, | Performed by: FAMILY MEDICINE

## 2023-07-10 PROCEDURE — 99214 PR OFFICE/OUTPT VISIT, EST, LEVL IV, 30-39 MIN: ICD-10-PCS | Mod: S$GLB,,, | Performed by: FAMILY MEDICINE

## 2023-07-10 PROCEDURE — 99999 PR PBB SHADOW E&M-EST. PATIENT-LVL IV: CPT | Mod: PBBFAC,,, | Performed by: FAMILY MEDICINE

## 2023-07-10 RX ORDER — MEMANTINE HYDROCHLORIDE 5 MG/1
5 TABLET ORAL DAILY
COMMUNITY
End: 2023-12-13

## 2023-07-10 NOTE — PROGRESS NOTES
[facility] - Clinic Note    Subjective      Ms. Hook is a 82 y.o. female who presents to clinic for a follow up of chronic conditions.     Accompanied today with her daughter.     Atrial fibrillation:  followed by Dr. Hardy Penny. Currently on Eliquis and amiodarone daily. Denies palpitations. last visit was 1 month ago.     Takes nexium for GERD.     HLD: currently takes pravastatin daily.      Has a history of chronic UTIs and for suppression therapy. Takes macrobid daily.     History of neuropathy in her feet bilaterally. Takes gabapentin 300mg TID with  relief.     Patient had DEXA 7/2020 scan done. Revealed osteopenia with a 12.7% risk of a major osteoporotic fracture and a 2.9% risk of hip fracture in the next 10 years (FRAX). receives prolia.      Started on Namenda for memory loss by cards and referred to neurology (Dr. Rocha). Does not have an appointment yet.  Daughter reports that patient has fallen asleep while driving twice now.   Reports that she doses off easily, does not feel refreshed by sleep, fatigue, and snores.   No witnessed apneic episodes.     H/o breast cancer on the left. Would like to continue mammograms.   Last one was last year and normal.     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   [facility] Theresa has a past surgical history that includes Appendectomy; Bartholin gland cyst excision; spine cyst surgery; Cataract extraction (3/30/09); Cataract extraction (4/13/09); Breast lumpectomy (Left, 2005); Hysterectomy (10/22/2015); Cardiac catheterization; Colonoscopy; Esophagogastroduodenoscopy; and Upper gastrointestinal endoscopy.    Theresa's family history includes Cancer in her brother, cousin, and paternal grandmother; Diabetes in her mother and sister; Heart disease in her sister.     "Theresa reports that she has never smoked. She has never used smokeless tobacco. She reports current alcohol use. She reports that she does not use drugs.   ИВАН Cardenas is allergic to sulfa (sulfonamide antibiotics), collagen, erythromycin, quinolones, sulfacetamide sodium, sulfasalazine, and tetracycline.   ZULMA Cardenas has a current medication list which includes the following prescription(s): amiodarone, apixaban, ascorbic acid (vitamin c), clobetasol, esomeprazole, fluticasone propionate, gabapentin, grape seed extract, ketoconazole, memantine, mometasone 0.1%, nitrofurantoin (macrocrystal-monohydrate), pravastatin, triamcinolone acetonide 0.1%, fexofenadine, and ketotifen.     Review of Systems   Constitutional:  Positive for fatigue. Negative for activity change, appetite change, chills and fever.   Eyes:  Negative for visual disturbance.   Respiratory:  Negative for cough and shortness of breath.    Cardiovascular:  Negative for chest pain, palpitations and leg swelling.   Gastrointestinal:  Negative for abdominal pain, nausea and vomiting.   Skin:  Negative for wound.   Neurological:  Negative for dizziness and headaches.   Psychiatric/Behavioral:  Positive for sleep disturbance. Negative for confusion.    Objective     /74 (BP Location: Left arm, Patient Position: Sitting)   Pulse 75   Resp 16   Ht 5' 6" (1.676 m)   Wt 75.3 kg (166 lb)   BMI 26.79 kg/m²     Physical Exam   Constitutional: normal appearance. She appears well-developed and well-nourished.  Non-toxic appearance. No distress. She does not appear ill.   HENT:   Head: Normocephalic and atraumatic.   Eyes: Right eye exhibits no discharge. Left eye exhibits no discharge.   Cardiovascular: Normal rate, regular rhythm, normal heart sounds and normal pulses. Exam reveals no gallop and no friction rub.   No murmur heard.Pulmonary:      Effort: Pulmonary effort is normal. No respiratory distress.      Breath sounds: Normal breath sounds. No " wheezing, rhonchi or rales.     Abdominal: Normal appearance.   Musculoskeletal:      Cervical back: Neck supple.   Lymphadenopathy:     She has no cervical adenopathy.   Neurological: She is alert.   Skin: Skin is warm and dry. Capillary refill takes less than 2 seconds. She is not diaphoretic.   Psychiatric: Her behavior is normal. Mood, judgment and thought content normal.   Vitals reviewed.   Assessment/Plan     Theresa was seen today for follow-up.    Diagnoses and all orders for this visit:    Uncontrolled excessive sleepiness while driving  -     Polysomnogram (CPAP will be added if patient meets diagnostic criteria.); Future    Unrefreshed by sleep  -     Polysomnogram (CPAP will be added if patient meets diagnostic criteria.); Future    Excessive daytime sleepiness  -     Polysomnogram (CPAP will be added if patient meets diagnostic criteria.); Future    Snores  -     Polysomnogram (CPAP will be added if patient meets diagnostic criteria.); Future    Memory loss    Paroxysmal atrial fibrillation    Coronary artery disease of native artery of native heart with stable angina pectoris    Hyperlipidemia, unspecified hyperlipidemia type    Gastroesophageal reflux disease, unspecified whether esophagitis present    Chronic UTI (urinary tract infection)    Neuropathy    Osteopenia of multiple sites  -     DXA Bone Density Axial Skeleton 1 or more sites; Future    Screening mammogram for breast cancer  -     Mammo Digital Screening Bilat w/ [facility]; Future    Personal history of breast cancer  -     Mammo Digital Screening Bilat w/ [facility]; Future    -order PSG for further eval of memory loss and falling asleep while driving etc.   -other chronic conditions stable. Continue current regimen.   -informed she was referred to neurology at [facility] per cards note.     Follow up in about 6 months (around 1/10/2024), or if symptoms worsen or fail to improve.    Future Appointments   Date Time Provider Department Center    12/27/2023  1:00 PM [facility] OP THERAPEUTICS 6 [facility] OPTHERA [facility]   1/10/2024  3:00 PM John Santoyo MD [facility]       John Santoyo MD  Family Medicine [facility]

## 2023-07-26 ENCOUNTER — PROCEDURE VISIT (OUTPATIENT)
Dept: SLEEP MEDICINE | Facility: HOSPITAL | Age: 83
End: 2023-07-26
Attending: FAMILY MEDICINE
Payer: MEDICARE

## 2023-07-26 DIAGNOSIS — G47.8 UNREFRESHED BY SLEEP: ICD-10-CM

## 2023-07-26 DIAGNOSIS — R40.0 UNCONTROLLED EXCESSIVE SLEEPINESS WHILE DRIVING: ICD-10-CM

## 2023-07-26 DIAGNOSIS — G47.19 EXCESSIVE DAYTIME SLEEPINESS: ICD-10-CM

## 2023-07-26 DIAGNOSIS — R06.83 SNORES: ICD-10-CM

## 2023-07-26 PROCEDURE — 95810 POLYSOM 6/> YRS 4/> PARAM: CPT

## 2023-07-31 ENCOUNTER — EXTERNAL CHRONIC CARE MANAGEMENT (OUTPATIENT)
Dept: PRIMARY CARE CLINIC | Facility: CLINIC | Age: 83
End: 2023-07-31
Payer: MEDICARE

## 2023-07-31 PROCEDURE — 99439 CHRNC CARE MGMT STAF EA ADDL: CPT | Mod: S$GLB,,, | Performed by: FAMILY MEDICINE

## 2023-07-31 PROCEDURE — 99490 CHRNC CARE MGMT STAFF 1ST 20: CPT | Mod: S$GLB,,, | Performed by: FAMILY MEDICINE

## 2023-07-31 PROCEDURE — 99490 PR CHRONIC CARE MGMT, 1ST 20 MIN: ICD-10-PCS | Mod: S$GLB,,, | Performed by: FAMILY MEDICINE

## 2023-07-31 PROCEDURE — 99439 PR CHRONIC CARE MGMT, EA ADDTL 20 MIN: ICD-10-PCS | Mod: S$GLB,,, | Performed by: FAMILY MEDICINE

## 2023-08-01 ENCOUNTER — HOSPITAL ENCOUNTER (OUTPATIENT)
Dept: RADIOLOGY | Facility: HOSPITAL | Age: 83
Discharge: HOME | End: 2023-08-01
Attending: FAMILY MEDICINE
Payer: MEDICARE

## 2023-08-01 ENCOUNTER — OFFICE VISIT (OUTPATIENT)
Dept: FAMILY MEDICINE | Facility: CLINIC | Age: 83
End: 2023-08-01
Payer: MEDICARE

## 2023-08-01 VITALS
BODY MASS INDEX: 26.68 KG/M2 | HEIGHT: 66 IN | HEART RATE: 63 BPM | DIASTOLIC BLOOD PRESSURE: 78 MMHG | OXYGEN SATURATION: 98 % | WEIGHT: 166 LBS | SYSTOLIC BLOOD PRESSURE: 134 MMHG | RESPIRATION RATE: 16 BRPM

## 2023-08-01 DIAGNOSIS — S39.012A ACUTE MYOFASCIAL STRAIN OF LUMBAR REGION, INITIAL ENCOUNTER: ICD-10-CM

## 2023-08-01 DIAGNOSIS — S39.012A ACUTE MYOFASCIAL STRAIN OF LUMBAR REGION, INITIAL ENCOUNTER: Primary | ICD-10-CM

## 2023-08-01 PROCEDURE — 99214 PR OFFICE/OUTPT VISIT, EST, LEVL IV, 30-39 MIN: ICD-10-PCS | Mod: ,,, | Performed by: FAMILY MEDICINE

## 2023-08-01 PROCEDURE — 1101F PR PT FALLS ASSESS DOC 0-1 FALLS W/OUT INJ PAST YR: ICD-10-PCS | Mod: CPTII,,, | Performed by: FAMILY MEDICINE

## 2023-08-01 PROCEDURE — 1125F PR PAIN SEVERITY QUANTIFIED, PAIN PRESENT: ICD-10-PCS | Mod: CPTII,,, | Performed by: FAMILY MEDICINE

## 2023-08-01 PROCEDURE — 1159F PR MEDICATION LIST DOCUMENTED IN MEDICAL RECORD: ICD-10-PCS | Mod: CPTII,,, | Performed by: FAMILY MEDICINE

## 2023-08-01 PROCEDURE — 72100 X-RAY EXAM L-S SPINE 2/3 VWS: CPT | Mod: 26,,, | Performed by: RADIOLOGY

## 2023-08-01 PROCEDURE — 3075F SYST BP GE 130 - 139MM HG: CPT | Mod: CPTII,,, | Performed by: FAMILY MEDICINE

## 2023-08-01 PROCEDURE — 3075F PR MOST RECENT SYSTOLIC BLOOD PRESS GE 130-139MM HG: ICD-10-PCS | Mod: CPTII,,, | Performed by: FAMILY MEDICINE

## 2023-08-01 PROCEDURE — 1125F AMNT PAIN NOTED PAIN PRSNT: CPT | Mod: CPTII,,, | Performed by: FAMILY MEDICINE

## 2023-08-01 PROCEDURE — 1101F PT FALLS ASSESS-DOCD LE1/YR: CPT | Mod: CPTII,,, | Performed by: FAMILY MEDICINE

## 2023-08-01 PROCEDURE — 99999 PR PBB SHADOW E&M-EST. PATIENT-LVL IV: CPT | Mod: PBBFAC,,, | Performed by: FAMILY MEDICINE

## 2023-08-01 PROCEDURE — 72100 X-RAY EXAM L-S SPINE 2/3 VWS: CPT | Mod: TC

## 2023-08-01 PROCEDURE — 72100 XR LUMBAR SPINE AP AND LATERAL: ICD-10-PCS | Mod: 26,,, | Performed by: RADIOLOGY

## 2023-08-01 PROCEDURE — 3288F FALL RISK ASSESSMENT DOCD: CPT | Mod: CPTII,,, | Performed by: FAMILY MEDICINE

## 2023-08-01 PROCEDURE — 3288F PR FALLS RISK ASSESSMENT DOCUMENTED: ICD-10-PCS | Mod: CPTII,,, | Performed by: FAMILY MEDICINE

## 2023-08-01 PROCEDURE — 1159F MED LIST DOCD IN RCRD: CPT | Mod: CPTII,,, | Performed by: FAMILY MEDICINE

## 2023-08-01 PROCEDURE — 99999 PR PBB SHADOW E&M-EST. PATIENT-LVL IV: ICD-10-PCS | Mod: PBBFAC,,, | Performed by: FAMILY MEDICINE

## 2023-08-01 PROCEDURE — 3078F PR MOST RECENT DIASTOLIC BLOOD PRESSURE < 80 MM HG: ICD-10-PCS | Mod: CPTII,,, | Performed by: FAMILY MEDICINE

## 2023-08-01 PROCEDURE — 99214 OFFICE O/P EST MOD 30 MIN: CPT | Mod: ,,, | Performed by: FAMILY MEDICINE

## 2023-08-01 PROCEDURE — 3078F DIAST BP <80 MM HG: CPT | Mod: CPTII,,, | Performed by: FAMILY MEDICINE

## 2023-08-01 RX ORDER — METHYLPREDNISOLONE 4 MG/1
TABLET ORAL
Qty: 21 EACH | Refills: 0 | Status: SHIPPED | OUTPATIENT
Start: 2023-08-01 | End: 2023-08-22

## 2023-08-01 NOTE — PROGRESS NOTES
[facility] - Clinic Note    Subjective      Ms. Hook is a 82 y.o. female who presents to clinic with complaints of back pain.     Accompanied with her daughter.     Reports lower back pain for the past week.   States that prior to the pain occurring, she was in the shower and almost fell therefore turned quickly to hold onto something. Unsure if this caused the pain.   Admits to pain being worse in the morning and stiff and sharp. Unable to get herself on and off of the bed.   She is in a wheelchair today but usually ambulates on her own or with a cane.   Has been taking tylenol and using a lidocaine cream which helps ease the pain.       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   [facility] Theresa has a past surgical history that includes Appendectomy; Bartholin gland cyst excision; spine cyst surgery; Cataract extraction (3/30/09); Cataract extraction (4/13/09); Breast lumpectomy (Left, 2005); Hysterectomy (10/22/2015); Cardiac catheterization; Colonoscopy; Esophagogastroduodenoscopy; and Upper gastrointestinal endoscopy.    Theresa's family history includes Cancer in her brother, cousin, and paternal grandmother; Diabetes in her mother and sister; Heart disease in her sister.    Theresa reports that she has never smoked. She has never used smokeless tobacco. She reports current alcohol use. She reports that she does not use drugs.   ИВАН Cardenas is allergic to sulfa (sulfonamide antibiotics), collagen, erythromycin, quinolones, sulfacetamide sodium, sulfasalazine, and tetracycline.   ZULMA Cardenas has a current medication list which includes the following prescription(s): amiodarone, apixaban, ascorbic acid (vitamin c), clobetasol, esomeprazole, fluticasone propionate, gabapentin, grape seed extract, ketoconazole, memantine,  "mometasone 0.1%, nitrofurantoin (macrocrystal-monohydrate), pravastatin, triamcinolone acetonide 0.1%, fexofenadine, ketotifen, and methylprednisolone.     Review of Systems   Constitutional:  Negative for activity change, appetite change, chills, fatigue and fever.   Eyes:  Negative for visual disturbance.   Respiratory:  Negative for cough and shortness of breath.    Cardiovascular:  Negative for chest pain, palpitations and leg swelling.   Gastrointestinal:  Negative for abdominal pain, nausea and vomiting.   Musculoskeletal:  Positive for back pain and gait problem.   Skin:  Negative for wound.   Neurological:  Negative for dizziness and headaches.   Psychiatric/Behavioral:  Negative for confusion.      Objective     /78 (BP Location: Left arm, Patient Position: Sitting, BP Method: Medium (Manual))   Pulse 63   Resp 16   Ht 5' 6" (1.676 m)   Wt 75.3 kg (166 lb 0.1 oz)   SpO2 98%   BMI 26.79 kg/m²     Physical Exam   Constitutional: normal appearance. She appears well-developed and well-nourished.  Non-toxic appearance. No distress. She does not appear ill.   HENT:   Head: Normocephalic and atraumatic.   Eyes: Right eye exhibits no discharge. Left eye exhibits no discharge.   Cardiovascular: Normal rate, regular rhythm, normal heart sounds and normal pulses. Exam reveals no gallop and no friction rub.   No murmur heard.Pulmonary:      Effort: Pulmonary effort is normal. No respiratory distress.      Breath sounds: Normal breath sounds. No wheezing, rhonchi or rales.     Abdominal: Normal appearance.   Musculoskeletal:      Cervical back: Neck supple.      Comments: Lower back: No effusion, erythema, ecchymosis, warmth, or deformities noted.  +TTP in the lumbar spine and bilateral paraspinal areas around L4-5   Lymphadenopathy:     She has no cervical adenopathy.   Neurological: She is alert.   Skin: Skin is warm and dry. Capillary refill takes less than 2 seconds. She is not diaphoretic. "   Psychiatric: Her behavior is normal. Mood, judgment and thought content normal.   Vitals reviewed.     Assessment/Plan     Theresa was seen today for back pain.    Diagnoses and all orders for this visit:    Acute myofascial strain of lumbar region, initial encounter  -     methylPREDNISolone (MEDROL DOSEPACK) 4 mg tablet; use as directed  -     X-Ray Lumbar Spine AP And Lateral; Future    -likely a strain. Unable to take NSAIDs as she is on eliquis. Tylenol prn and steroid pack. Recommended OTC biofreeze and alternate hot and cold packs. Back stretches given to do at home as tolerated.     Follow up if symptoms worsen or fail to improve.    Future Appointments   Date Time Provider Department Center   8/1/2023 11:15 AM [facility] XR2 [facility] XRAY [facility]   8/10/2023 11:00 AM [facility] MAMMO2 [facility] MAMMO [facility]   8/10/2023 11:20 AM [facility] DEXA1 [facility] DEXA [facility]   12/27/2023  1:00 PM [facility] OP THERAPEUTICS 6 [facility] OPTHERA [facility]   1/10/2024  3:00 PM John Santoyo MD [facility]       John Santoyo MD  Family Medicine  [facility]

## 2023-08-10 ENCOUNTER — HOSPITAL ENCOUNTER (OUTPATIENT)
Dept: RADIOLOGY | Facility: HOSPITAL | Age: 83
Discharge: HOME | End: 2023-08-10
Attending: FAMILY MEDICINE
Payer: MEDICARE

## 2023-08-10 DIAGNOSIS — Z12.31 SCREENING MAMMOGRAM FOR BREAST CANCER: ICD-10-CM

## 2023-08-10 DIAGNOSIS — Z85.3 PERSONAL HISTORY OF BREAST CANCER: ICD-10-CM

## 2023-08-10 DIAGNOSIS — M85.89 OSTEOPENIA OF MULTIPLE SITES: ICD-10-CM

## 2023-08-10 PROCEDURE — 77067 SCR MAMMO BI INCL CAD: CPT | Mod: TC

## 2023-08-10 PROCEDURE — 77080 DXA BONE DENSITY AXIAL SKELETON 1 OR MORE SITES: ICD-10-PCS | Mod: 26,,, | Performed by: RADIOLOGY

## 2023-08-10 PROCEDURE — 77063 MAMMO DIGITAL SCREENING BILAT WITH TOMO: ICD-10-PCS | Mod: 26,,, | Performed by: RADIOLOGY

## 2023-08-10 PROCEDURE — 77080 DXA BONE DENSITY AXIAL: CPT | Mod: TC

## 2023-08-10 PROCEDURE — 77063 BREAST TOMOSYNTHESIS BI: CPT | Mod: 26,,, | Performed by: RADIOLOGY

## 2023-08-10 PROCEDURE — 77067 MAMMO DIGITAL SCREENING BILAT WITH TOMO: ICD-10-PCS | Mod: 26,,, | Performed by: RADIOLOGY

## 2023-08-10 PROCEDURE — 77080 DXA BONE DENSITY AXIAL: CPT | Mod: 26,,, | Performed by: RADIOLOGY

## 2023-08-10 PROCEDURE — 77067 SCR MAMMO BI INCL CAD: CPT | Mod: 26,,, | Performed by: RADIOLOGY

## 2023-08-22 ENCOUNTER — PROCEDURE VISIT (OUTPATIENT)
Dept: SLEEP MEDICINE | Facility: HOSPITAL | Age: 83
End: 2023-08-22
Attending: FAMILY MEDICINE
Payer: MEDICARE

## 2023-08-22 DIAGNOSIS — G47.33 OSA (OBSTRUCTIVE SLEEP APNEA): ICD-10-CM

## 2023-08-22 DIAGNOSIS — G47.33 OSA (OBSTRUCTIVE SLEEP APNEA): Primary | ICD-10-CM

## 2023-08-22 PROCEDURE — 95811 POLYSOM 6/>YRS CPAP 4/> PARM: CPT

## 2023-08-25 ENCOUNTER — TELEPHONE (OUTPATIENT)
Dept: FAMILY MEDICINE | Facility: CLINIC | Age: 83
End: 2023-08-25
Payer: MEDICARE

## 2023-08-25 NOTE — TELEPHONE ENCOUNTER
----- Message from Faustina Genao sent at 8/25/2023 11:33 AM CDT -----  Type:  Needs Medical Advice    Who Called: pt daughter lakeisha Alva Call Back Number: [phone]  Additional Information:  Requesting call back , send over the sleep study results to Dr. Rocha at [facility]   Wants to know if yall can email the release form so she can sign it [email or web address]  please advise thank you

## 2023-09-05 ENCOUNTER — TELEPHONE (OUTPATIENT)
Dept: FAMILY MEDICINE | Facility: CLINIC | Age: 83
End: 2023-09-05
Payer: MEDICARE

## 2023-09-05 NOTE — TELEPHONE ENCOUNTER
----- Message from Falguni Good sent at 9/5/2023 10:17 AM CDT -----  Contact: Liz Bolaños  Type: Needs Medical Advice    Who Called: Liz/ PT Daughter  Best Call Back Number: [phone]  Additional  Information: PT daughter asking to get pt Sleep Study results faxed to radha Whelan/ [facility]  FAX# [phone]. Would also like to know status of C-PAP machine being sent to home.  Please Advise- Thank you

## 2023-09-15 ENCOUNTER — TELEPHONE (OUTPATIENT)
Dept: FAMILY MEDICINE | Facility: CLINIC | Age: 83
End: 2023-09-15
Payer: MEDICARE

## 2023-09-15 NOTE — TELEPHONE ENCOUNTER
----- Message from Da Molina sent at 9/15/2023 11:13 AM CDT -----  Type:  Patient Returning Call    Who Called:  Patient  Who Left Message for Patient:  NA  Does the patient know what this is regarding?:    Best Call Back Number:  [phone]  Additional Information:

## 2023-09-20 ENCOUNTER — TELEPHONE (OUTPATIENT)
Dept: FAMILY MEDICINE | Facility: CLINIC | Age: 83
End: 2023-09-20
Payer: MEDICARE

## 2023-09-20 NOTE — TELEPHONE ENCOUNTER
----- Message from Thu Knapp sent at 9/20/2023 11:33 AM CDT -----  Contact: JJMARCELINO AYALA  Type:  Needs Medical Advice    Who Called: JJMARCELINO  Symptoms (please be specific): WOULD LIKE TO DISCUSS STARTING HOME HEALTH    Would the patient rather a call back or a response via [facility]? CALL   Best Call Back Number:  [phone] MARCELINO GARDNER   Additional Information: THANK YOU

## 2023-09-22 ENCOUNTER — TELEPHONE (OUTPATIENT)
Dept: FAMILY MEDICINE | Facility: CLINIC | Age: 83
End: 2023-09-22
Payer: MEDICARE

## 2023-09-22 NOTE — TELEPHONE ENCOUNTER
Spoke with pt son Uriel. Uriel requested to have home health assist with bringing patient to store, medication, etc. Informed son home health does not have those services. Son requested to place message to MD

## 2023-09-22 NOTE — TELEPHONE ENCOUNTER
----- Message from Sofia Angelo sent at 9/20/2023 12:06 PM CDT -----  Contact: pt son Uriel  Type: Needs Medical Advice         Who Called: Pt Son Uriel  Best Call Back Number: [phone]  Additional Information: Requesting a call back regarding Pt son returned call about starting Home Health for pt.  Son asking for a call back please.      Uriel is asking for a referral for Home Health that best will suit pt needs. Pt will need a home aid for medication, take to dr chau and take to store when needed etc.  For when family is not in town.     Please Advise- Thank you

## 2023-10-02 ENCOUNTER — TELEPHONE (OUTPATIENT)
Dept: FAMILY MEDICINE | Facility: CLINIC | Age: 83
End: 2023-10-02
Payer: MEDICARE

## 2023-10-02 NOTE — TELEPHONE ENCOUNTER
"Does not have home health.   The patient son is looking for someone around the clock to sit with his mom. States, " [facility] told us we could request home health to extend the hours."However, pt does not have home health  "

## 2023-10-02 NOTE — TELEPHONE ENCOUNTER
"Spoke with pt son. Pt son informed per Dr. Mcduffie ,Home health does not offer those services. This will have to be done with a private home aid.   They can also call her insurance to confirm if her insurance covers these services" son states, " does the provider recommend anything else because [employer] recommended we contact her PCP"  "

## 2023-10-02 NOTE — TELEPHONE ENCOUNTER
----- Message from Sapna Galvan sent at 10/2/2023  2:25 PM CDT -----  Contact: pt son breann  Type: Needs Medical Advice  Who Called:  pt arnav crain    Best Call Back Number: [phone]  Additional Information: gustavo needs your office to resubmit the paperwork with more hours for homehealth to come.  Please call breann her son to discuss

## 2023-10-03 ENCOUNTER — TELEPHONE (OUTPATIENT)
Dept: FAMILY MEDICINE | Facility: CLINIC | Age: 83
End: 2023-10-03
Payer: MEDICARE

## 2023-10-03 DIAGNOSIS — R41.3 MEMORY LOSS: Primary | ICD-10-CM

## 2023-10-03 DIAGNOSIS — Z74.09 IMPAIRED FUNCTIONAL MOBILITY, BALANCE, GAIT, AND ENDURANCE: ICD-10-CM

## 2023-10-03 DIAGNOSIS — I48.0 PAROXYSMAL ATRIAL FIBRILLATION: ICD-10-CM

## 2023-10-03 DIAGNOSIS — Z91.81 AT HIGH RISK FOR FALLS: ICD-10-CM

## 2023-10-03 DIAGNOSIS — N39.0 CHRONIC UTI (URINARY TRACT INFECTION): ICD-10-CM

## 2023-10-03 DIAGNOSIS — G62.9 NEUROPATHY: ICD-10-CM

## 2023-10-03 DIAGNOSIS — I25.118 CORONARY ARTERY DISEASE OF NATIVE ARTERY OF NATIVE HEART WITH STABLE ANGINA PECTORIS: ICD-10-CM

## 2023-10-03 NOTE — TELEPHONE ENCOUNTER
----- Message from Zoila Tomlinson sent at 10/3/2023  8:58 AM CDT -----  Contact: Patient son  Type:  Needs Medical Advice    Who Called: Patient's son    Would the patient rather a call back or a response via [facility]? Call    Best Call Back Number: [phone] (Uriel Santos)    Additional Information: Patient's son would like to go ahead and start PT and what the insurance covers through home health with the patient. Please call to discuss.

## 2023-10-04 NOTE — TELEPHONE ENCOUNTER
Spoke with pt`s son erika. Told him I called [facility]. They stated only their [facility] and [place] locations offer home aid services though they do not have a nurse at this time. The services are only Intermittent health care. Notified he will have to contact a private home health service for aid services.

## 2023-10-06 ENCOUNTER — HOSPITAL ENCOUNTER (EMERGENCY)
Facility: HOSPITAL | Age: 83
Discharge: HOME | End: 2023-10-06
Attending: FAMILY MEDICINE
Payer: MEDICARE

## 2023-10-06 VITALS
DIASTOLIC BLOOD PRESSURE: 64 MMHG | TEMPERATURE: 98 F | OXYGEN SATURATION: 96 % | WEIGHT: 168 LBS | SYSTOLIC BLOOD PRESSURE: 128 MMHG | RESPIRATION RATE: 16 BRPM | HEART RATE: 80 BPM | HEIGHT: 66 IN | BODY MASS INDEX: 27 KG/M2
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
## 2023-10-06 PROCEDURE — 99283 EMERGENCY DEPT VISIT LOW MDM: CPT

## 2023-10-06 NOTE — DISCHARGE INSTRUCTIONS
Follow-up with primary care physician as scheduled.  Return to ER if any numbness, paresthesias, weakness or headache occur

## 2023-10-06 NOTE — ED PROVIDER NOTES
"Encounter Date: 10/6/2023       History     Chief Complaint   Patient presents with    Fall     Pt states that she tripped and had a ground level fall. Pt states that she hit her forehead on a door. No obvious signs of trauma. Pt is on blood thinners and her family wanted her checked out.      Patient is an 83-year-old female who has past medical history of atrial fibrillation, hypertension and takes Eliquis.  Patient was on her toilet in her bathroom earlier today and was attempting to get off the toilet but lost her balance.  The patient fell forward and hit her head against the wall.  Patient denies any loss of consciousness.  Patient denies any cervical pain.  Patient denies any numbness, paresthesias, focal weakness.  Patient denies any headache.  Patient was brought for evaluation.  Patient has no signs of trauma such as any swelling or ecchymosis to area that she hit against the wall.      Review of patient's allergies indicates:   Allergen Reactions    Sulfa (sulfonamide antibiotics)      Other reaction(s): Rash  Other reaction(s): Dizziness    Collagen      Other reaction(s): burning  Other reaction(s): Swelling  Other reaction(s): Rash  Other reaction(s): Hallucinations    Erythromycin      Other reaction(s): Rash    Quinolones Swelling     Other reaction(s): concern b/o chronic tendonitis    Sulfacetamide sodium Other (See Comments)     "spots in front of my eyes and dizzy"    Sulfasalazine Other (See Comments)     "spots in front of my eyes and dizzy"    Tetracycline      Other reaction(s): Rash     Past Medical History:   Diagnosis Date    A-fib     Amblyopia     right eye    Breast cancer 2005    left    Diarrhea 10/30/2012    Dry eye syndrome     HTN (hypertension) 8/14/2012    Interval gout 8/14/2012    Keratoconus of right eye     Stroke 06/2016    Dr. Rocha     Past Surgical History:   Procedure Laterality Date    APPENDECTOMY      BARTHOLIN GLAND CYST EXCISION      BREAST LUMPECTOMY Left 2005    " CARDIAC CATHETERIZATION      CATARACT EXTRACTION  3/30/09    right eye    CATARACT EXTRACTION  4/13/09    left eye    COLONOSCOPY      ESOPHAGOGASTRODUODENOSCOPY      HYSTERECTOMY  10/22/2015    spine cyst surgery      UPPER GASTROINTESTINAL ENDOSCOPY       Family History   Problem Relation Age of Onset    Diabetes Mother     Diabetes Sister     Heart disease Sister     Cancer Brother     Cancer Paternal Grandmother     Cancer Cousin     Breast cancer Neg Hx     Cervical cancer Neg Hx     Vaginal cancer Neg Hx     Endometrial cancer Neg Hx     Ovarian cancer Neg Hx      Social History     Tobacco Use    Smoking status: Never    Smokeless tobacco: Never   Substance Use Topics    Alcohol use: Yes     Comment: social    Drug use: No     Review of Systems   All other systems reviewed and are negative.      Physical Exam     Initial Vitals [10/06/23 1716]   BP Pulse Resp Temp SpO2   128/64 80 16 98.2 °F (36.8 °C) 96 %      MAP       --         Physical Exam    Nursing note and vitals reviewed.  Constitutional: She appears well-developed and well-nourished. She is not diaphoretic. No distress.   HENT:   Head: Normocephalic and atraumatic.   Nose: Nose normal.   Mouth/Throat: No oropharyngeal exudate.   No signs of trauma to head.   Eyes: Conjunctivae and EOM are normal. Pupils are equal, round, and reactive to light. Right eye exhibits no discharge. Left eye exhibits no discharge. No scleral icterus.   Neck: Neck supple.   Normal range of motion.  Cardiovascular:  Normal rate, regular rhythm, normal heart sounds and intact distal pulses.           No murmur heard.  Pulmonary/Chest: Breath sounds normal. No respiratory distress.   Abdominal: Abdomen is soft. Bowel sounds are normal. She exhibits no distension. There is no abdominal tenderness.   Musculoskeletal:         General: No tenderness or edema. Normal range of motion.      Cervical back: Normal range of motion and neck supple.     Lymphadenopathy:     She has no  cervical adenopathy.   Neurological: She is alert and oriented to person, place, and time. She has normal strength. GCS score is 15. GCS eye subscore is 4. GCS verbal subscore is 5. GCS motor subscore is 6.   Skin: Skin is warm and dry. Capillary refill takes less than 2 seconds. No rash noted. No erythema.   Psychiatric: She has a normal mood and affect. Her behavior is normal. Judgment and thought content normal.         ED Course   Procedures  Labs Reviewed - No data to display       Imaging Results    None          Medications - No data to display  Medical Decision Making  Patient arrived to be evaluated after losing balance from her toilet.  Patient reportedly struck her head against the bathroom wall.  Patient had no loss of consciousness or injury to any extremity or her neck.  Patient had no signs of trauma.  Patient does take Eliquis for atrial fibrillation.  I discussed with family that patient did not have indication for head CT as she had no signs of trauma such as swelling or ecchymosis.  Patient has no headache.  Patient has no neurologic findings.  Patient was discharged home to follow-up with primary care physician.  Is recommended patient can return to ER if any neurologic symptoms or headache develop.  Patient and family voiced understanding.                               Clinical Impression:   Final diagnoses:  [W19.XXXA] Fall, initial encounter - from seated postition (Primary)        ED Disposition Condition    Discharge Stable          ED Prescriptions    None       Follow-up Information    None     Follow-up with primary care physician return to ER as needed.     Janes Kate MD  10/06/23 0151

## 2023-10-16 ENCOUNTER — TELEPHONE (OUTPATIENT)
Dept: FAMILY MEDICINE | Facility: CLINIC | Age: 83
End: 2023-10-16
Payer: MEDICARE

## 2023-10-16 NOTE — TELEPHONE ENCOUNTER
----- Message from Thu Knapp sent at 10/16/2023 11:46 AM CDT -----  Contact: JJ, SON  Type:  Needs Medical Advice    Who Called:  JJ AND SADIQ , SON AND DAUGHTER IN LAW  Would the patient rather a call back or a response via [facility]? CALL   Best Call Back Number: [phone] JJ / [phone] SADIQ   Additional Information: PLEASE CALL TO PROVIDE AN UPDATE ON PT HOME HEALTH REFERRAL   THANK YOU

## 2023-10-16 NOTE — TELEPHONE ENCOUNTER
Spoke with pt`s daughter in law Ada. Notified the referral has been sent to [facility] in [place]. Ada received the phone number to home health to give them a call first thing in the morning to follow up. Fax resent to facility today and confirmed via fax.

## 2023-10-30 DIAGNOSIS — G62.9 NEUROPATHY: ICD-10-CM

## 2023-10-30 RX ORDER — GABAPENTIN 300 MG/1
CAPSULE ORAL
Qty: 90 CAPSULE | Refills: 11 | Status: SHIPPED | OUTPATIENT
Start: 2023-10-30 | End: 2023-12-13

## 2023-10-30 NOTE — TELEPHONE ENCOUNTER
Refill Routing Note   Medication(s) are not appropriate for processing by [facility] for the following reason(s):      Medication outside of protocol  Non-participating provider    [facility] action(s):  Route Care Due:  None identified          Appointments  past 12m or future 3m with PCP    Date Provider   Last Visit   8/1/2023 John Santoyo MD   Next Visit   1/10/2024 John Santoyo MD   ED visits in past 90 days: 1        Note composed:11:44 AM 10/30/2023

## 2023-11-13 ENCOUNTER — TELEPHONE (OUTPATIENT)
Dept: FAMILY MEDICINE | Facility: CLINIC | Age: 83
End: 2023-11-13
Payer: MEDICARE

## 2023-11-13 NOTE — TELEPHONE ENCOUNTER
----- Message from Mattie Borden sent at 11/13/2023  3:23 PM CST -----  Regarding: Needs return call  Type: Needs Medical Advice  Who Called:  Michael Dela Cruz    Best Call Back Number: [phone]    Additional Information: Pt spoke to gertrudis and told her she didn't need home health or assisted living, that was incorrect, her son has been trying to get her set up with her [employer] plan please call him at number above.

## 2023-11-13 NOTE — TELEPHONE ENCOUNTER
Returned call to patient melba, ruth. Reports they have power of  over their mom, does not wish for office to speak with pt. Requesting referral for home health services to sit with pt 35 hours, 8 hour days. Informed son that home health does not offer that service. Informed son pt would need appt. Son will call back to schedule.    0 = independent

## 2023-11-22 ENCOUNTER — OFFICE VISIT (OUTPATIENT)
Dept: URGENT CARE | Facility: CLINIC | Age: 83
End: 2023-11-22
Payer: MEDICARE

## 2023-11-22 VITALS
BODY MASS INDEX: 27.05 KG/M2 | TEMPERATURE: 98 F | DIASTOLIC BLOOD PRESSURE: 78 MMHG | WEIGHT: 168.31 LBS | SYSTOLIC BLOOD PRESSURE: 150 MMHG | HEART RATE: 76 BPM | OXYGEN SATURATION: 96 % | RESPIRATION RATE: 18 BRPM | HEIGHT: 66 IN

## 2023-11-22 DIAGNOSIS — R09.81 NASAL CONGESTION: ICD-10-CM

## 2023-11-22 DIAGNOSIS — R05.9 COUGH, UNSPECIFIED TYPE: ICD-10-CM

## 2023-11-22 DIAGNOSIS — J01.90 ACUTE BACTERIAL SINUSITIS: Primary | ICD-10-CM

## 2023-11-22 DIAGNOSIS — B96.89 ACUTE BACTERIAL SINUSITIS: Primary | ICD-10-CM

## 2023-11-22 LAB
CTP QC/QA: YES
CTP QC/QA: YES
POC MOLECULAR INFLUENZA A AGN: NEGATIVE
POC MOLECULAR INFLUENZA B AGN: NEGATIVE
SARS-COV-2 AG RESP QL IA.RAPID: NEGATIVE

## 2023-11-22 PROCEDURE — 87502 POCT INFLUENZA A/B MOLECULAR: ICD-10-PCS | Mod: QW,,, | Performed by: NURSE PRACTITIONER

## 2023-11-22 PROCEDURE — 87502 INFLUENZA DNA AMP PROBE: CPT | Mod: QW,,, | Performed by: NURSE PRACTITIONER

## 2023-11-22 PROCEDURE — 87811 SARS-COV-2 COVID19 W/OPTIC: CPT | Mod: QW,S$GLB,, | Performed by: NURSE PRACTITIONER

## 2023-11-22 PROCEDURE — 99213 PR OFFICE/OUTPT VISIT, EST, LEVL III, 20-29 MIN: ICD-10-PCS | Mod: S$GLB,,, | Performed by: NURSE PRACTITIONER

## 2023-11-22 PROCEDURE — 87811 SARS CORONAVIRUS 2 ANTIGEN POCT, MANUAL READ: ICD-10-PCS | Mod: QW,S$GLB,, | Performed by: NURSE PRACTITIONER

## 2023-11-22 PROCEDURE — 99213 OFFICE O/P EST LOW 20 MIN: CPT | Mod: S$GLB,,, | Performed by: NURSE PRACTITIONER

## 2023-11-22 RX ORDER — CEFDINIR 300 MG/1
300 CAPSULE ORAL 2 TIMES DAILY
Qty: 20 CAPSULE | Refills: 0 | Status: SHIPPED | OUTPATIENT
Start: 2023-11-22 | End: 2023-12-02

## 2023-11-22 NOTE — PROGRESS NOTES
"Subjective:       Patient ID: Theresa Hook is a 83 y.o. female.    Vitals:  height is 5' 6" (1.676 m) and weight is 76.3 kg (168 lb 4.8 oz). Her oral temperature is 97.8 °F (36.6 °C). Her blood pressure is 150/78 (abnormal) and her pulse is 76. Her respiration is 18 and oxygen saturation is 96%.     Chief Complaint: Cough (Patient presents with sneezing and coughing since Monday. Patient requests flu and covid tests. )    This is a 83 y.o. female who presents today with a chief complaint of  Patient presents with sneezing and coughing x 3 days. Patient requests flu and covid tests.       Patient presents with:  Cough: Patient presents with sneezing and coughing since Monday. Patient requests flu and covid tests.          Cough  This is a new problem. The problem has been unchanged. The cough is Non-productive. Associated symptoms include nasal congestion, postnasal drip and rhinorrhea. Pertinent negatives include no shortness of breath or wheezing. She has tried nothing for the symptoms. The treatment provided no relief.       Constitution: Negative.   HENT:  Positive for congestion and postnasal drip.    Respiratory:  Positive for cough. Negative for shortness of breath and wheezing.    Neurological:  Negative for disorientation.   Psychiatric/Behavioral:  Negative for disorientation and confusion.            Objective:      Physical Exam   Constitutional: She is oriented to person, place, and time. She appears well-developed. She is cooperative.  Non-toxic appearance. She does not appear ill. No distress.   HENT:   Head: Normocephalic and atraumatic.   Ears:   Right Ear: Hearing, tympanic membrane, external ear and ear canal normal.   Left Ear: Hearing, tympanic membrane, external ear and ear canal normal.   Nose: No mucosal edema, rhinorrhea or nasal deformity. No epistaxis. Right sinus exhibits maxillary sinus tenderness. Right sinus exhibits no frontal sinus tenderness. Left sinus exhibits maxillary " sinus tenderness. Left sinus exhibits no frontal sinus tenderness.   Mouth/Throat: Uvula is midline, oropharynx is clear and moist and mucous membranes are normal. No trismus in the jaw. Normal dentition. No uvula swelling. No oropharyngeal exudate, posterior oropharyngeal edema or posterior oropharyngeal erythema.   Eyes: Conjunctivae and lids are normal. No scleral icterus.   Neck: Trachea normal and phonation normal. Neck supple. No edema present. No erythema present. No neck rigidity present.   Cardiovascular: Normal rate, regular rhythm, normal heart sounds and normal pulses.   Pulmonary/Chest: Effort normal and breath sounds normal. No respiratory distress. She has no decreased breath sounds. She has no rhonchi.   Abdominal: Normal appearance.   Musculoskeletal: Normal range of motion.         General: No deformity. Normal range of motion.   Neurological: She is alert and oriented to person, place, and time. She exhibits normal muscle tone. Coordination normal.   Skin: Skin is warm, dry, intact, not diaphoretic and not pale.   Psychiatric: Her speech is normal and behavior is normal. Judgment and thought content normal.   Nursing note and vitals reviewed.        Past medical history and current medications reviewed.     Results for orders placed or performed in visit on 11/22/23   SARS Coronavirus 2 Antigen, POCT Manual Read   Result Value Ref Range    SARS Coronavirus 2 Antigen Negative Negative     Acceptable Yes    POCT Influenza A/B Molecular   Result Value Ref Range    POC Molecular Influenza A Ag Negative Negative, Not Reported    POC Molecular Influenza B Ag Negative Negative, Not Reported     Acceptable Yes       Assessment:           1. Acute bacterial sinusitis    2. Cough, unspecified type    3. Nasal congestion              Plan:         Acute bacterial sinusitis  -     cefdinir (OMNICEF) 300 MG capsule; Take 1 capsule (300 mg total) by mouth 2 (two) times daily. for  10 days  Dispense: 20 capsule; Refill: 0    Cough, unspecified type  -     SARS Coronavirus 2 Antigen, POCT Manual Read  -     POCT Influenza A/B Molecular    Nasal congestion             Patient Instructions   Please return here or go to the Emergency Department for any concerns or worsening of condition.  Please drink plenty of fluids.  Please get plenty of rest.  If you were prescribed antibiotics, please take them to completion.  If you were given wait & see antibiotics, please wait 5-7 days before taking them, and only take them if your symptoms have worsened or not improved.  If you do begin taking the antibiotics, please take them to completion.  If you were given a steroid shot in the clinic and have also been given a prescription for a steroid such as Prednisone or a Medrol Dose Pack, please begin taking them tomorrow.  If you do not have Hypertension or any history of palpitations, it is ok to take over the counter Sudafed or Mucinex D or Allegra-D or Claritin-D or Zyrtec-D.  If you do take one of the above, it is ok to combine that with plain over the counter Mucinex or Allegra or Claritin or Zyrtec.  If for example you are taking Zyrtec -D, you can combine that with Mucinex, but not Mucinex-D.  If you are taking Mucinex-D, you can combine that with plain Allegra or Claritin or Zyrtec.   If you do have Hypertension or palpitations, it is safe to take Coricidin HBP for relief of sinus symptoms.  If not allergic, please take over the counter Tylenol (Acetaminophen) and/or Motrin (Ibuprofen) as directed for control of pain and/or fever.  Please follow up with your primary care doctor or specialist as needed.    If you  smoke, please stop smoking.           KARLI Humphries

## 2023-11-28 ENCOUNTER — TELEPHONE (OUTPATIENT)
Dept: FAMILY MEDICINE | Facility: CLINIC | Age: 83
End: 2023-11-28

## 2023-11-28 NOTE — TELEPHONE ENCOUNTER
Yonny Bernardo,  Please review this message below from patient's family concerning referral to home health services.  Call pllaced to pt's son due to msg left, spoke w/son states that provider placed a order for Home Health services in October but family has not heard from them. Informed pt's son I will send a msg over to Az Nguyen MA to find out what happen w/referral   Thank you.  Signed:  Getachew Gutierrez LPN    ----- Message from Mireya Byrd sent at 11/27/2023  9:55 AM CST -----  Contact: Uriel  Type:  Needs Medical Advice    Who Called: Jose De Jesus Trevino  Symptoms (please be specific): son needs dr or nurse to call concerning HH services, 35 hrs a week. Son wants to know what it is taking so long to approve. Son sts pt has dementia.   Would the patient rather a call back or a response via [facility]? call  Best Call Back Number:  or leave a message if doesn't .    Additional Information: please advise and thank you.

## 2023-11-30 ENCOUNTER — TELEPHONE (OUTPATIENT)
Dept: FAMILY MEDICINE | Facility: CLINIC | Age: 83
End: 2023-11-30
Payer: MEDICARE

## 2023-11-30 NOTE — TELEPHONE ENCOUNTER
----- Message from Haritha Lino LPN sent at 11/28/2023  4:24 PM CST -----  Regarding: FW: update  referral  [facility] [phone]    ----- Message -----  From: Ladonna Garg  Sent: 11/28/2023   4:24 PM CST  To: Natanael KELLEY Staff  Subject: update  referral                               Type:  Patient Requesting Referral / UPDATE with new provider info    Who Called:  Emani / [facility]    Does the patient already have the specialty appointment scheduled?:  no    If yes, what is the date of that appointment?:  n/a    Referral to What Specialty:  home health    Reason for Referral:      Does the patient want the referral with a specific physician?:      Is the specialist an [facility]?:      Patient Requesting a Call Back?:      Best Call Back Number:  [phone]    Additional Information:   needs to know who the new signing provider will be / she needs new referral with provider.

## 2023-11-30 NOTE — TELEPHONE ENCOUNTER
----- Message from Justa Garcia, Patient Care Assistant sent at 11/30/2023 10:25 AM CST -----  Regarding: orders  Contact: ander with [facility]  Type: Needs Medical Advice    Who Called:  ander with [facility]    Best Call Back Number:      Additional Information: ander with [facility] states she would like a callback regarding an referral for home health. Please call to advise. Thanks!

## 2023-12-01 ENCOUNTER — OFFICE VISIT (OUTPATIENT)
Dept: FAMILY MEDICINE | Facility: CLINIC | Age: 83
End: 2023-12-01
Payer: MEDICARE

## 2023-12-01 VITALS
HEIGHT: 66 IN | HEART RATE: 71 BPM | WEIGHT: 166.69 LBS | SYSTOLIC BLOOD PRESSURE: 134 MMHG | DIASTOLIC BLOOD PRESSURE: 68 MMHG | BODY MASS INDEX: 26.79 KG/M2 | TEMPERATURE: 98 F | OXYGEN SATURATION: 100 %

## 2023-12-01 DIAGNOSIS — Z78.9 NONSMOKER: ICD-10-CM

## 2023-12-01 DIAGNOSIS — F03.B0 MODERATE DEMENTIA, UNSPECIFIED DEMENTIA TYPE, UNSPECIFIED WHETHER BEHAVIORAL, PSYCHOTIC, OR MOOD DISTURBANCE OR ANXIETY: Primary | ICD-10-CM

## 2023-12-01 DIAGNOSIS — J30.9 ALLERGIC RHINITIS, UNSPECIFIED SEASONALITY, UNSPECIFIED TRIGGER: ICD-10-CM

## 2023-12-01 PROBLEM — J20.9 ACUTE BRONCHITIS: Status: ACTIVE | Noted: 2023-12-01

## 2023-12-01 PROCEDURE — 1159F MED LIST DOCD IN RCRD: CPT | Mod: CPTII,S$GLB,, | Performed by: FAMILY MEDICINE

## 2023-12-01 PROCEDURE — 99999 PR PBB SHADOW E&M-EST. PATIENT-LVL III: CPT | Mod: PBBFAC,,, | Performed by: FAMILY MEDICINE

## 2023-12-01 PROCEDURE — 3078F DIAST BP <80 MM HG: CPT | Mod: CPTII,S$GLB,, | Performed by: FAMILY MEDICINE

## 2023-12-01 PROCEDURE — 3075F PR MOST RECENT SYSTOLIC BLOOD PRESS GE 130-139MM HG: ICD-10-PCS | Mod: CPTII,S$GLB,, | Performed by: FAMILY MEDICINE

## 2023-12-01 PROCEDURE — 3075F SYST BP GE 130 - 139MM HG: CPT | Mod: CPTII,S$GLB,, | Performed by: FAMILY MEDICINE

## 2023-12-01 PROCEDURE — 1159F PR MEDICATION LIST DOCUMENTED IN MEDICAL RECORD: ICD-10-PCS | Mod: CPTII,S$GLB,, | Performed by: FAMILY MEDICINE

## 2023-12-01 PROCEDURE — 99999 PR PBB SHADOW E&M-EST. PATIENT-LVL III: ICD-10-PCS | Mod: PBBFAC,,, | Performed by: FAMILY MEDICINE

## 2023-12-01 PROCEDURE — 99214 OFFICE O/P EST MOD 30 MIN: CPT | Mod: S$GLB,,, | Performed by: FAMILY MEDICINE

## 2023-12-01 PROCEDURE — 99214 PR OFFICE/OUTPT VISIT, EST, LEVL IV, 30-39 MIN: ICD-10-PCS | Mod: S$GLB,,, | Performed by: FAMILY MEDICINE

## 2023-12-01 PROCEDURE — 3078F PR MOST RECENT DIASTOLIC BLOOD PRESSURE < 80 MM HG: ICD-10-PCS | Mod: CPTII,S$GLB,, | Performed by: FAMILY MEDICINE

## 2023-12-01 RX ORDER — PROMETHAZINE HYDROCHLORIDE AND DEXTROMETHORPHAN HYDROBROMIDE 6.25; 15 MG/5ML; MG/5ML
5 SYRUP ORAL EVERY 8 HOURS PRN
Qty: 118 ML | Refills: 0 | Status: SHIPPED | OUTPATIENT
Start: 2023-12-01 | End: 2023-12-13

## 2023-12-01 RX ORDER — PREDNISONE 10 MG/1
10 TABLET ORAL DAILY
Qty: 5 TABLET | Refills: 0 | Status: SHIPPED | OUTPATIENT
Start: 2023-12-01 | End: 2023-12-07

## 2023-12-01 RX ORDER — IPRATROPIUM BROMIDE 21 UG/1
2 SPRAY, METERED NASAL 3 TIMES DAILY
Qty: 30 ML | Refills: 1 | Status: SHIPPED | OUTPATIENT
Start: 2023-12-01 | End: 2023-12-13

## 2023-12-01 NOTE — PROGRESS NOTES
Subjective     Patient ID: Theresa Hook is a 83 y.o. female.    Chief Complaint: Establish Care    Urgent care visit    Pt was seen by 1 of our providers Monday before thanksgiving for URI symptoms.  Treated with antibiotic.  Patient states overall she is feeling better, but still has a runny nose and cough that can get worse at times.  Patient denies any chest pain or shortness a breath, fevers or chills.  Patient currently not taking anything for her symptoms.  Patient here with family member.  Patient states she has chronic back pain, but it is not bothering her today    Dementia: Stable with medications      Review of Systems   Constitutional:  Negative for activity change, appetite change, chills, fatigue, fever and unexpected weight change.   HENT:  Positive for postnasal drip and rhinorrhea. Negative for ear discharge, ear pain, hearing loss, mouth dryness, mouth sores, sinus pressure/congestion, sneezing, sore throat and trouble swallowing.    Eyes:  Negative for photophobia, pain, discharge, redness and itching.   Respiratory:  Positive for cough. Negative for chest tightness, shortness of breath and wheezing.    Cardiovascular:  Negative for chest pain and palpitations.   Neurological:  Negative for dizziness and headaches.          Objective     Physical Exam  Vitals reviewed.   Constitutional:       General: She is not in acute distress.     Appearance: Normal appearance. She is not ill-appearing or toxic-appearing.   HENT:      Nose: Congestion present. No rhinorrhea.   Eyes:      General:         Right eye: No discharge.         Left eye: No discharge.   Cardiovascular:      Rate and Rhythm: Normal rate and regular rhythm.      Heart sounds: Normal heart sounds.   Pulmonary:      Effort: Pulmonary effort is normal.      Breath sounds: Normal breath sounds.   Chest:      Chest wall: No tenderness.   Neurological:      General: No focal deficit present.      Mental Status: She is alert and  oriented to person, place, and time.   Psychiatric:         Mood and Affect: Mood normal.         Behavior: Behavior normal.            Assessment and Plan     1. Moderate dementia, unspecified dementia type, unspecified whether behavioral, psychotic, or mood disturbance or anxiety    2. Acute bronchitis, unspecified organism    3. Nonsmoker    Other orders  -     predniSONE (DELTASONE) 10 MG tablet; Take 1 tablet (10 mg total) by mouth once daily. for 5 days  Dispense: 5 tablet; Refill: 0  -     promethazine-dextromethorphan (PROMETHAZINE-DM) 6.25-15 mg/5 mL Syrp; Take 5 mLs by mouth every 8 (eight) hours as needed (cough).  Dispense: 118 mL; Refill: 0  -     ipratropium (ATROVENT) 21 mcg (0.03 %) nasal spray; 2 sprays by Each Nostril route 3 (three) times daily.  Dispense: 30 mL; Refill: 1      Chart reviewed.  Patient has allergy medications listed on her chart that she is not taking.  Advised to resume and/or purchase over the counter allergy medication like Claritin or Allegra.  Can also resume Flonase.  We will send prescription for cough medication, short course of steroids and ipratropium nasal spray.  Advised to follow up with a PCP if no improvement or symptoms worsen.   Risks, benefits, and side effects were discussed with the patient. All questions were answered to the fullest satisfaction of the patient, and pt verbalized understanding and agreement to treatment plan. Pt was to call with any new or worsening symptoms, or present to the ER.         Suzy Pro MD  Family Medicine Physician   [facility]     This note was created using M*Modal voice recognition software that occasionally may misinterpret phrases or words.

## 2023-12-05 ENCOUNTER — TELEPHONE (OUTPATIENT)
Dept: FAMILY MEDICINE | Facility: CLINIC | Age: 83
End: 2023-12-05
Payer: MEDICARE

## 2023-12-05 NOTE — TELEPHONE ENCOUNTER
Patient is requesting to have her CMP(calcium) checked. Last checked 05/22/23 9.4. Please review and advise.

## 2023-12-05 NOTE — TELEPHONE ENCOUNTER
Left message on vm for patient to contact the office to schedule an appointment to discuss checking CMP(calcium is her concern).

## 2023-12-05 NOTE — TELEPHONE ENCOUNTER
----- Message from Noreen Lopez sent at 12/5/2023  8:30 AM CST -----  Type: Needs Medical Advice  Who Called:  Vanita  Artie Call Back Number: [phone]  Additional Information: Calling is requesting to get the pts calcium checked since it was not done at her recent visit, pl call bk and advise thanks

## 2023-12-07 ENCOUNTER — OFFICE VISIT (OUTPATIENT)
Dept: FAMILY MEDICINE | Facility: CLINIC | Age: 83
End: 2023-12-07
Payer: MEDICARE

## 2023-12-07 ENCOUNTER — LAB VISIT (OUTPATIENT)
Dept: LAB | Facility: HOSPITAL | Age: 83
End: 2023-12-07
Attending: NURSE PRACTITIONER
Payer: MEDICARE

## 2023-12-07 VITALS
BODY MASS INDEX: 26.73 KG/M2 | SYSTOLIC BLOOD PRESSURE: 131 MMHG | DIASTOLIC BLOOD PRESSURE: 74 MMHG | OXYGEN SATURATION: 97 % | HEART RATE: 65 BPM | WEIGHT: 165.63 LBS

## 2023-12-07 DIAGNOSIS — Z79.899 HIGH RISK MEDICATION USE: ICD-10-CM

## 2023-12-07 DIAGNOSIS — I48.0 PAROXYSMAL ATRIAL FIBRILLATION: ICD-10-CM

## 2023-12-07 DIAGNOSIS — M85.851 OSTEOPENIA OF BOTH HIPS: ICD-10-CM

## 2023-12-07 DIAGNOSIS — F03.B0 MODERATE DEMENTIA, UNSPECIFIED DEMENTIA TYPE, UNSPECIFIED WHETHER BEHAVIORAL, PSYCHOTIC, OR MOOD DISTURBANCE OR ANXIETY: ICD-10-CM

## 2023-12-07 DIAGNOSIS — Z51.81 ENCOUNTER FOR MEDICATION MONITORING: Primary | ICD-10-CM

## 2023-12-07 DIAGNOSIS — M85.852 OSTEOPENIA OF BOTH HIPS: ICD-10-CM

## 2023-12-07 DIAGNOSIS — Z51.81 ENCOUNTER FOR MEDICATION MONITORING: ICD-10-CM

## 2023-12-07 LAB
ALBUMIN SERPL BCP-MCNC: 3.6 G/DL (ref 3.5–5.2)
ALP SERPL-CCNC: 61 U/L (ref 55–135)
ALT SERPL W/O P-5'-P-CCNC: 13 U/L (ref 10–44)
ANION GAP SERPL CALC-SCNC: 10 MMOL/L (ref 8–16)
AST SERPL-CCNC: 18 U/L (ref 10–40)
BASOPHILS # BLD AUTO: 0.14 K/UL (ref 0–0.2)
BASOPHILS NFR BLD: 2.1 % (ref 0–1.9)
BILIRUB SERPL-MCNC: 0.5 MG/DL (ref 0.1–1)
BUN SERPL-MCNC: 10 MG/DL (ref 8–23)
CALCIUM SERPL-MCNC: 8.8 MG/DL (ref 8.7–10.5)
CHLORIDE SERPL-SCNC: 105 MMOL/L (ref 95–110)
CO2 SERPL-SCNC: 25 MMOL/L (ref 23–29)
CREAT SERPL-MCNC: 0.7 MG/DL (ref 0.5–1.4)
DIFFERENTIAL METHOD: ABNORMAL
EOSINOPHIL # BLD AUTO: 0.3 K/UL (ref 0–0.5)
EOSINOPHIL NFR BLD: 5 % (ref 0–8)
ERYTHROCYTE [DISTWIDTH] IN BLOOD BY AUTOMATED COUNT: 15.9 % (ref 11.5–14.5)
EST. GFR  (NO RACE VARIABLE): >60 ML/MIN/1.73 M^2
GLUCOSE SERPL-MCNC: 92 MG/DL (ref 70–110)
HCT VFR BLD AUTO: 36.7 % (ref 37–48.5)
HGB BLD-MCNC: 10.9 G/DL (ref 12–16)
IMM GRANULOCYTES # BLD AUTO: 0.02 K/UL (ref 0–0.04)
IMM GRANULOCYTES NFR BLD AUTO: 0.3 % (ref 0–0.5)
LYMPHOCYTES # BLD AUTO: 1.6 K/UL (ref 1–4.8)
LYMPHOCYTES NFR BLD: 25.1 % (ref 18–48)
MAGNESIUM SERPL-MCNC: 1.9 MG/DL (ref 1.6–2.6)
MCH RBC QN AUTO: 24.7 PG (ref 27–31)
MCHC RBC AUTO-ENTMCNC: 29.7 G/DL (ref 32–36)
MCV RBC AUTO: 83 FL (ref 82–98)
MONOCYTES # BLD AUTO: 0.7 K/UL (ref 0.3–1)
MONOCYTES NFR BLD: 10.9 % (ref 4–15)
NEUTROPHILS # BLD AUTO: 3.7 K/UL (ref 1.8–7.7)
NEUTROPHILS NFR BLD: 56.6 % (ref 38–73)
NRBC BLD-RTO: 0 /100 WBC
PLATELET # BLD AUTO: 353 K/UL (ref 150–450)
PMV BLD AUTO: 10.1 FL (ref 9.2–12.9)
POTASSIUM SERPL-SCNC: 4.3 MMOL/L (ref 3.5–5.1)
PROT SERPL-MCNC: 7.1 G/DL (ref 6–8.4)
RBC # BLD AUTO: 4.41 M/UL (ref 4–5.4)
SODIUM SERPL-SCNC: 140 MMOL/L (ref 136–145)
WBC # BLD AUTO: 6.54 K/UL (ref 3.9–12.7)

## 2023-12-07 PROCEDURE — 3078F PR MOST RECENT DIASTOLIC BLOOD PRESSURE < 80 MM HG: ICD-10-PCS | Mod: CPTII,S$GLB,, | Performed by: NURSE PRACTITIONER

## 2023-12-07 PROCEDURE — 3078F DIAST BP <80 MM HG: CPT | Mod: CPTII,S$GLB,, | Performed by: NURSE PRACTITIONER

## 2023-12-07 PROCEDURE — 1100F PR PT FALLS ASSESS DOC 2+ FALLS/FALL W/INJURY/YR: ICD-10-PCS | Mod: CPTII,S$GLB,, | Performed by: NURSE PRACTITIONER

## 2023-12-07 PROCEDURE — 3075F SYST BP GE 130 - 139MM HG: CPT | Mod: CPTII,S$GLB,, | Performed by: NURSE PRACTITIONER

## 2023-12-07 PROCEDURE — 85025 COMPLETE CBC W/AUTO DIFF WBC: CPT | Performed by: NURSE PRACTITIONER

## 2023-12-07 PROCEDURE — 1160F RVW MEDS BY RX/DR IN RCRD: CPT | Mod: CPTII,S$GLB,, | Performed by: NURSE PRACTITIONER

## 2023-12-07 PROCEDURE — 1100F PTFALLS ASSESS-DOCD GE2>/YR: CPT | Mod: CPTII,S$GLB,, | Performed by: NURSE PRACTITIONER

## 2023-12-07 PROCEDURE — 83735 ASSAY OF MAGNESIUM: CPT | Performed by: NURSE PRACTITIONER

## 2023-12-07 PROCEDURE — 99999 PR PBB SHADOW E&M-EST. PATIENT-LVL IV: ICD-10-PCS | Mod: PBBFAC,,, | Performed by: NURSE PRACTITIONER

## 2023-12-07 PROCEDURE — 80053 COMPREHEN METABOLIC PANEL: CPT | Performed by: NURSE PRACTITIONER

## 2023-12-07 PROCEDURE — 1126F PR PAIN SEVERITY QUANTIFIED, NO PAIN PRESENT: ICD-10-PCS | Mod: CPTII,S$GLB,, | Performed by: NURSE PRACTITIONER

## 2023-12-07 PROCEDURE — 1159F PR MEDICATION LIST DOCUMENTED IN MEDICAL RECORD: ICD-10-PCS | Mod: CPTII,S$GLB,, | Performed by: NURSE PRACTITIONER

## 2023-12-07 PROCEDURE — 36415 COLL VENOUS BLD VENIPUNCTURE: CPT | Performed by: NURSE PRACTITIONER

## 2023-12-07 PROCEDURE — 3288F PR FALLS RISK ASSESSMENT DOCUMENTED: ICD-10-PCS | Mod: CPTII,S$GLB,, | Performed by: NURSE PRACTITIONER

## 2023-12-07 PROCEDURE — 99213 OFFICE O/P EST LOW 20 MIN: CPT | Mod: S$GLB,,, | Performed by: NURSE PRACTITIONER

## 2023-12-07 PROCEDURE — 99213 PR OFFICE/OUTPT VISIT, EST, LEVL III, 20-29 MIN: ICD-10-PCS | Mod: S$GLB,,, | Performed by: NURSE PRACTITIONER

## 2023-12-07 PROCEDURE — 1160F PR REVIEW ALL MEDS BY PRESCRIBER/CLIN PHARMACIST DOCUMENTED: ICD-10-PCS | Mod: CPTII,S$GLB,, | Performed by: NURSE PRACTITIONER

## 2023-12-07 PROCEDURE — 3288F FALL RISK ASSESSMENT DOCD: CPT | Mod: CPTII,S$GLB,, | Performed by: NURSE PRACTITIONER

## 2023-12-07 PROCEDURE — 1159F MED LIST DOCD IN RCRD: CPT | Mod: CPTII,S$GLB,, | Performed by: NURSE PRACTITIONER

## 2023-12-07 PROCEDURE — 3075F PR MOST RECENT SYSTOLIC BLOOD PRESS GE 130-139MM HG: ICD-10-PCS | Mod: CPTII,S$GLB,, | Performed by: NURSE PRACTITIONER

## 2023-12-07 PROCEDURE — 99999 PR PBB SHADOW E&M-EST. PATIENT-LVL IV: CPT | Mod: PBBFAC,,, | Performed by: NURSE PRACTITIONER

## 2023-12-07 PROCEDURE — 1126F AMNT PAIN NOTED NONE PRSNT: CPT | Mod: CPTII,S$GLB,, | Performed by: NURSE PRACTITIONER

## 2023-12-07 NOTE — PROGRESS NOTES
Subjective:       Patient ID: Theresa Hook is a 83 y.o. female.    Chief Complaint: Follow-up    Theresa Hook presents to the clinic today with her caregiver for follow up   Previous patient of Dr. Santoyo     She was recently treated for URI. Reports that she is feeling much better.    Upcoming appointment for Xavier  Receives every 6 months  Needs routine blood work prior to appointment  7/10/2023- DEXA  FINDINGS:  The L1 to L4 vertebral bone mineral density is equal to 1.345 g/cm squared with a T score of 1.2.  Prior BMD 1.191.     The left femoral neck bone mineral density is equal to 0.890 g/cm squared with a T score of -1.1.  Prior BMD 0.863.     The total hip bone mineral density is equal to 0.926 g/cm squared with a T score of -0.6.  Prior BMD 0.899.     There is a 12.0% risk of a major osteoporotic fracture and a 2.7% risk of hip fracture in the next 10 years (FRAX).     Impression:     Osteopenia.      Patient/Caregiver report no issues, concerns or complaints at today's visit       Review of Systems   Constitutional:  Negative for activity change, appetite change, fatigue and fever.   Eyes:  Negative for visual disturbance.   Respiratory:  Negative for cough, chest tightness and shortness of breath.    Cardiovascular:  Negative for chest pain, palpitations and leg swelling.   Gastrointestinal:  Negative for abdominal pain, nausea and vomiting.   Genitourinary:  Negative for difficulty urinating and pelvic pain.   Musculoskeletal: Negative.    Skin: Negative.  Negative for wound.   Neurological:  Negative for dizziness and headaches.   Hematological: Negative.    Psychiatric/Behavioral:  Negative for agitation and confusion.        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    [place] ([place] hearing[place]    Atrophic vaginitis    Mixed stress and urge urinary incontinence    S/P laparoscopic assisted vaginal  hysterectomy (LAVH),     Nocturia    Personal history of breast cancer, 2005    Pelvic floor weakness    Imbalance due to old stroke    Need for Tdap vaccination    Need for vaccination with 13-polyvalent pneumococcal conjugate vaccine    Paroxysmal atrial fibrillation    Old embolic stroke without late effect, assoc a fib     S/P coronary artery stent placement,     Amnestic MCI (mild cognitive impairment with memory loss)    H/O: stroke,     Gait instability    Family history of Charcot foot disease    Encounter for screening mammogram for malignant neoplasm of breast    Abdominal bloating    Frequent UTI    Irregular bowel habits    Anticoagulant long-term use, apixiban    Coronary artery disease involving native coronary artery of native heart    S/P angioplasty with stent,     Keratoconus of right eye    ABMD (anterior basement membrane dystrophy)    Pseudophakia of both eyes    Osteopenia of both hips    At risk of fracture due to osteoporosis    Gastroesophageal reflux disease    Diverticula of colon    Moderate dementia, unspecified dementia type, unspecified whether behavioral, psychotic, or mood disturbance or anxiety    Acute bronchitis       Objective:      Physical Exam  Constitutional:       General: She is not in acute distress.     Appearance: Normal appearance.   Eyes:      Extraocular Movements: Extraocular movements intact.      Conjunctiva/sclera: Conjunctivae normal.   Cardiovascular:      Rate and Rhythm: Normal rate and regular rhythm.      Heart sounds: Normal heart sounds. No murmur heard.  Pulmonary:      Effort: Pulmonary effort is normal. No respiratory distress.      Breath sounds: Normal breath sounds. No wheezing.   Musculoskeletal:      Right lower leg: No edema.      Left lower leg: No edema.   Skin:     General: Skin is warm and dry.      Findings: No rash.   Neurological:      General: No focal deficit present.      Mental Status: She is alert.  Mental status is at baseline.   Psychiatric:         Mood and Affect: Mood normal.         Behavior: Behavior normal.         Lab Results   Component Value Date    WBC 4.31 05/22/2023    HGB 11.4 (L) 05/22/2023    HCT 37.8 05/22/2023     05/22/2023    CHOL 209 (H) 05/22/2023    TRIG 76 05/22/2023    HDL 72 05/22/2023    ALT 16 05/22/2023    AST 21 05/22/2023     05/22/2023    K 4.2 05/22/2023     05/22/2023    CREATININE 0.8 05/22/2023    BUN 11 05/22/2023    CO2 25 05/22/2023    TSH 2.095 05/22/2023    HGBA1C 5.4 04/25/2017     The ASCVD Risk score (Rupert WILKINSON, et al., 2019) failed to calculate for the following reasons:    The 2019 ASCVD risk score is only valid for ages 40 to 79  Visit Vitals  /74 (BP Location: Left arm, Patient Position: Sitting, BP Method: Medium (Automatic))   Pulse 65   Wt 75.1 kg (165 lb 9.6 oz)   SpO2 97%   BMI 26.73 kg/m²      Assessment:       1. Encounter for medication monitoring    2. Osteopenia of both hips    3. High risk medication use    4. Paroxysmal atrial fibrillation    5. Moderate dementia, unspecified dementia type, unspecified whether behavioral, psychotic, or mood disturbance or anxiety        Plan:       1. Encounter for medication monitoring  -     Comprehensive Metabolic Panel; Future; Expected date: 12/07/2023  -     Magnesium; Future; Expected date: 12/07/2023  -     CBC Auto Differential; Future; Expected date: 12/07/2023    2. Osteopenia of both hips  Maintain physical activity  Continue Vit D/Calcium   DME/good supportive shoes, avoid area/throw rugs, avoid low lit areas   3. High risk medication use  -     Comprehensive Metabolic Panel; Future; Expected date: 12/07/2023  -     CBC Auto Differential; Future; Expected date: 12/07/2023    4. Paroxysmal atrial fibrillation  Continue current medication regimen  Monitor for s/sx of excessive bleeding/bruising   5. Moderate dementia, unspecified dementia type, unspecified whether behavioral,  psychotic, or mood disturbance or anxiety  Stable without reports of agitation/mood changes.   Continue current regimen      Follow up in about 6 months (around 6/7/2024).      Future Appointments       Date Provider Specialty Appt Notes    12/12/2023 Art Edge DPM Podiatry INGROWN TOE NAILS ON BOTH FEET  - RIGHT FOOT HURTS THE MOST, LVM DC 12/6/23 12/27/2023  Infusion Therapy prolia    6/7/2024 Josette Lino MD Family Medicine 6 Month Follow up

## 2023-12-12 ENCOUNTER — OFFICE VISIT (OUTPATIENT)
Dept: PODIATRY | Facility: CLINIC | Age: 83
End: 2023-12-12
Payer: MEDICARE

## 2023-12-12 VITALS
WEIGHT: 165 LBS | SYSTOLIC BLOOD PRESSURE: 154 MMHG | HEIGHT: 66 IN | HEART RATE: 73 BPM | BODY MASS INDEX: 26.52 KG/M2 | DIASTOLIC BLOOD PRESSURE: 76 MMHG

## 2023-12-12 DIAGNOSIS — L60.0 INGROWN NAIL: Primary | ICD-10-CM

## 2023-12-12 DIAGNOSIS — I73.9 PERIPHERAL VASCULAR DISEASE: ICD-10-CM

## 2023-12-12 PROCEDURE — 1159F MED LIST DOCD IN RCRD: CPT | Mod: CPTII,S$GLB,, | Performed by: PODIATRIST

## 2023-12-12 PROCEDURE — 99202 PR OFFICE/OUTPT VISIT, NEW, LEVL II, 15-29 MIN: ICD-10-PCS | Mod: S$GLB,,, | Performed by: PODIATRIST

## 2023-12-12 PROCEDURE — 3288F FALL RISK ASSESSMENT DOCD: CPT | Mod: CPTII,S$GLB,, | Performed by: PODIATRIST

## 2023-12-12 PROCEDURE — 1100F PTFALLS ASSESS-DOCD GE2>/YR: CPT | Mod: CPTII,S$GLB,, | Performed by: PODIATRIST

## 2023-12-12 PROCEDURE — 99999 PR PBB SHADOW E&M-EST. PATIENT-LVL III: CPT | Mod: PBBFAC,,, | Performed by: PODIATRIST

## 2023-12-12 PROCEDURE — 1126F PR PAIN SEVERITY QUANTIFIED, NO PAIN PRESENT: ICD-10-PCS | Mod: CPTII,S$GLB,, | Performed by: PODIATRIST

## 2023-12-12 PROCEDURE — 3077F PR MOST RECENT SYSTOLIC BLOOD PRESSURE >= 140 MM HG: ICD-10-PCS | Mod: CPTII,S$GLB,, | Performed by: PODIATRIST

## 2023-12-12 PROCEDURE — 1159F PR MEDICATION LIST DOCUMENTED IN MEDICAL RECORD: ICD-10-PCS | Mod: CPTII,S$GLB,, | Performed by: PODIATRIST

## 2023-12-12 PROCEDURE — 3078F DIAST BP <80 MM HG: CPT | Mod: CPTII,S$GLB,, | Performed by: PODIATRIST

## 2023-12-12 PROCEDURE — 3288F PR FALLS RISK ASSESSMENT DOCUMENTED: ICD-10-PCS | Mod: CPTII,S$GLB,, | Performed by: PODIATRIST

## 2023-12-12 PROCEDURE — 99999 PR PBB SHADOW E&M-EST. PATIENT-LVL III: ICD-10-PCS | Mod: PBBFAC,,, | Performed by: PODIATRIST

## 2023-12-12 PROCEDURE — 3078F PR MOST RECENT DIASTOLIC BLOOD PRESSURE < 80 MM HG: ICD-10-PCS | Mod: CPTII,S$GLB,, | Performed by: PODIATRIST

## 2023-12-12 PROCEDURE — 3077F SYST BP >= 140 MM HG: CPT | Mod: CPTII,S$GLB,, | Performed by: PODIATRIST

## 2023-12-12 PROCEDURE — 1160F RVW MEDS BY RX/DR IN RCRD: CPT | Mod: CPTII,S$GLB,, | Performed by: PODIATRIST

## 2023-12-12 PROCEDURE — 1160F PR REVIEW ALL MEDS BY PRESCRIBER/CLIN PHARMACIST DOCUMENTED: ICD-10-PCS | Mod: CPTII,S$GLB,, | Performed by: PODIATRIST

## 2023-12-12 PROCEDURE — 99202 OFFICE O/P NEW SF 15 MIN: CPT | Mod: S$GLB,,, | Performed by: PODIATRIST

## 2023-12-12 PROCEDURE — 1126F AMNT PAIN NOTED NONE PRSNT: CPT | Mod: CPTII,S$GLB,, | Performed by: PODIATRIST

## 2023-12-12 PROCEDURE — 1100F PR PT FALLS ASSESS DOC 2+ FALLS/FALL W/INJURY/YR: ICD-10-PCS | Mod: CPTII,S$GLB,, | Performed by: PODIATRIST

## 2023-12-13 PROBLEM — I73.9 PERIPHERAL VASCULAR DISEASE: Status: ACTIVE | Noted: 2023-12-13

## 2023-12-13 PROBLEM — L60.0 INGROWN NAIL: Status: ACTIVE | Noted: 2023-12-13

## 2023-12-13 NOTE — PROGRESS NOTES
Subjective:       Patient ID: Theresa Hook is a 83 y.o. female.    Chief Complaint: Ingrown Toenail (Right foot, great toe)    Patient presents patient's caregiver states it is likely been  more than a month because they have tried to get the patient in on several occasions and had to cancel the appointment because she was not feeling well.  Patient is concerned that her big toes may be infected as they have gotten progressively more painful.    Past Medical History:   Diagnosis Date    A-fib     Amblyopia     right eye    Breast cancer 2005    left    Diarrhea 10/30/2012    Dry eye syndrome     HTN (hypertension) 8/14/2012    Interval gout 8/14/2012    Keratoconus of right eye     Stroke 06/2016    Dr. Rocha     Past Surgical History:   Procedure Laterality Date    APPENDECTOMY      BARTHOLIN GLAND CYST EXCISION      BREAST LUMPECTOMY Left 2005    CARDIAC CATHETERIZATION      CATARACT EXTRACTION  3/30/09    right eye    CATARACT EXTRACTION  4/13/09    left eye    COLONOSCOPY      ESOPHAGOGASTRODUODENOSCOPY      HYSTERECTOMY  10/22/2015    spine cyst surgery      UPPER GASTROINTESTINAL ENDOSCOPY           Social History     Socioeconomic History    Marital status:    Tobacco Use    Smoking status: Never    Smokeless tobacco: Never   Substance and Sexual Activity    Alcohol use: Yes     Comment: social    Drug use: No    Sexual activity: Not Currently     Birth control/protection: Post-menopausal   Social History Narrative    [employer] in [place], [place]     first for 30 years      moved a lot in service     Lived in [place], [place] and [place]    dtr in [place]    Son in [place]        Moved back home to [place], [place] in 2002 with  when he retired b/o friends and family     30 years    He passed away in 2008 from [facility], cared for by Dr. 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      apixaban (ELIQUIS) 2.5 mg Tab Take 2.5 mg by mouth 2 (two) times daily.       esomeprazole (NEXIUM) 20 MG capsule Take 20 mg by mouth once daily.      nitrofurantoin, macrocrystal-monohydrate, (MACROBID) 100 MG capsule TAKE 1 CAPSULE AT BEDTIME WITH FOOD TO PREVENT URINARY TRACT INFECTIONS 30 capsule 11    pravastatin sodium (PRAVASTATIN ORAL) Take by mouth.       No current facility-administered medications for this visit.     Review of patient's allergies indicates:   Allergen Reactions    Sulfa (sulfonamide antibiotics)      Other reaction(s): Rash  Other reaction(s): Dizziness    Collagen      Other reaction(s): burning  Other reaction(s): Swelling  Other reaction(s): Rash  Other reaction(s): Hallucinations    Erythromycin      Other reaction(s): Rash    Quinolones Swelling     Other reaction(s): concern b/o chronic tendonitis    Sulfacetamide sodium Other (See Comments)     "spots in front of my eyes and dizzy"    Sulfasalazine Other (See Comments)     "spots in front of my eyes and dizzy"    Tetracycline      Other reaction(s): Rash       Review of Systems   Musculoskeletal:  Positive for arthralgias.   Skin:  Positive for color change.   All other systems reviewed and are negative.      Objective:      Vitals:    12/12/23 1053   BP: (!) 154/76   BP Location: Left arm   Patient Position: Sitting   Pulse: 73   Weight: 74.8 kg (165 lb)   Height: 5' 6" (1.676 m)     Physical Exam  Vitals and nursing note reviewed.   Constitutional:       Appearance: Normal appearance.   Cardiovascular:      Pulses:           Dorsalis pedis pulses are 1+ on the right side and 1+ on the left side.        Posterior tibial pulses are 0 on the right side and 0 on the left side.   Pulmonary:      Effort: Pulmonary effort is normal.   Musculoskeletal:         General: Swelling, tenderness and deformity present.      Right foot: Deformity present.      Left foot: Deformity " present.   Feet:      Right foot:      Protective Sensation: 3 sites tested.  3 sites sensed.      Skin integrity: Erythema and warmth present.      Toenail Condition: Right toenails are abnormally thick and ingrown. Fungal disease present.     Left foot:      Protective Sensation: 3 sites tested.  3 sites sensed.      Skin integrity: Erythema and warmth present.      Toenail Condition: Left toenails are abnormally thick and ingrown. Fungal disease present.  Skin:     Capillary Refill: Capillary refill takes more than 3 seconds.      Findings: Erythema present.   Neurological:      General: No focal deficit present.      Mental Status: She is alert.   Psychiatric:         Mood and Affect: Mood normal.         Behavior: Behavior normal.                                Assessment:       1. Ingrown nail    2. Peripheral vascular disease        Plan:       Patient presents patient's caregiver states it is likely been  more than a month because they have tried to get the patient in on several occasions and had to cancel the appointment because she was not feeling well.  Patient is concerned that her big toes may be infected as they have gotten progressively more painful.  On evaluation patient does have obvious signs of peripheral vascular disease pulses are barely and nonpalpable bilateral she has severely incurvated nail medial lateral border bilateral hallux they are significantly painful and uncomfortable upon evaluation the patient's right is worse than the left.  Patient did not need a nail avulsion there was no active drainage while the areas are inflamed they do not appear actively infected I was able to carefully trim and remove the ingrowing nail from both the medial lateral border bilateral hallux patient noted immediate relief upon removal of the nail I have advised the patient the patient's caregiver they need to monitor this closely any increased redness swelling pain discomfort or drainage they need to  contact us immediately however the patient's should be pain-free in the next 4-5 days once the area where the nail was trimmed settles down.  I did advised the patient the nail will likely try to grow back in the same position it is important that she keeps these properly trimmed and does not let them become ingrown again as they were today.  Comprehensive new patient evaluation performed.  Follow-up as needed.  Face-to-face time including discussion evaluation treatment equaled 15 minutes.This note was created using [employer] voice recognition software that occasionally misinterpreted phrases or words.

## 2023-12-15 ENCOUNTER — TELEPHONE (OUTPATIENT)
Dept: FAMILY MEDICINE | Facility: CLINIC | Age: 83
End: 2023-12-15
Payer: MEDICARE

## 2023-12-15 NOTE — TELEPHONE ENCOUNTER
----- Message from Grey Willingham sent at 12/15/2023  4:00 PM CST -----  Contact: son  Type: Needs Medical Advice  Who Called:  Son    Best Call Back Number: [phone]  Additional Information: States he would like to speak with office regarding pt Home Health Services says he would like update on referral.Please call back

## 2023-12-18 ENCOUNTER — HOSPITAL ENCOUNTER (EMERGENCY)
Facility: HOSPITAL | Age: 83
Discharge: HOME | End: 2023-12-19
Attending: EMERGENCY MEDICINE
Payer: MEDICARE

## 2023-12-18 DIAGNOSIS — S89.90XA KNEE INJURY, INITIAL ENCOUNTER: ICD-10-CM

## 2023-12-18 DIAGNOSIS — R41.82 AMS (ALTERED MENTAL STATUS): ICD-10-CM
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
## 2023-12-18 DIAGNOSIS — S83.92XA SPRAIN OF LEFT KNEE, UNSPECIFIED LIGAMENT, INITIAL ENCOUNTER: ICD-10-CM

## 2023-12-18 DIAGNOSIS — F03.A0 MILD DEMENTIA WITHOUT BEHAVIORAL DISTURBANCE, PSYCHOTIC DISTURBANCE, MOOD DISTURBANCE, OR ANXIETY, UNSPECIFIED DEMENTIA TYPE: ICD-10-CM

## 2023-12-18 DIAGNOSIS — S69.90XA WRIST INJURY: ICD-10-CM

## 2023-12-18 PROCEDURE — 99285 EMERGENCY DEPT VISIT HI MDM: CPT

## 2023-12-18 PROCEDURE — P9612 CATHETERIZE FOR URINE SPEC: HCPCS

## 2023-12-18 PROCEDURE — 51798 US URINE CAPACITY MEASURE: CPT

## 2023-12-19 VITALS
HEART RATE: 62 BPM | TEMPERATURE: 98 F | WEIGHT: 170 LBS | OXYGEN SATURATION: 97 % | HEIGHT: 66 IN | SYSTOLIC BLOOD PRESSURE: 147 MMHG | DIASTOLIC BLOOD PRESSURE: 69 MMHG | RESPIRATION RATE: 13 BRPM | BODY MASS INDEX: 27.32 KG/M2

## 2023-12-19 LAB
ALBUMIN SERPL BCP-MCNC: 3.8 G/DL (ref 3.5–5.2)
ALP SERPL-CCNC: 72 U/L (ref 55–135)
ALT SERPL W/O P-5'-P-CCNC: 17 U/L (ref 10–44)
ANION GAP SERPL CALC-SCNC: 15 MMOL/L (ref 8–16)
AST SERPL-CCNC: 23 U/L (ref 10–40)
BASOPHILS # BLD AUTO: 0.07 K/UL (ref 0–0.2)
BASOPHILS NFR BLD: 0.8 % (ref 0–1.9)
BILIRUB SERPL-MCNC: 0.6 MG/DL (ref 0.1–1)
BILIRUB UR QL STRIP: NEGATIVE
BUN SERPL-MCNC: 11 MG/DL (ref 8–23)
CALCIUM SERPL-MCNC: 9.2 MG/DL (ref 8.7–10.5)
CHLORIDE SERPL-SCNC: 105 MMOL/L (ref 95–110)
CLARITY UR: CLEAR
CO2 SERPL-SCNC: 23 MMOL/L (ref 23–29)
COLOR UR: YELLOW
CREAT SERPL-MCNC: 0.8 MG/DL (ref 0.5–1.4)
DIFFERENTIAL METHOD: ABNORMAL
EOSINOPHIL # BLD AUTO: 0 K/UL (ref 0–0.5)
EOSINOPHIL NFR BLD: 0.2 % (ref 0–8)
ERYTHROCYTE [DISTWIDTH] IN BLOOD BY AUTOMATED COUNT: 16.1 % (ref 11.5–14.5)
EST. GFR  (NO RACE VARIABLE): >60 ML/MIN/1.73 M^2
GLUCOSE SERPL-MCNC: 117 MG/DL (ref 70–110)
GLUCOSE UR QL STRIP: NEGATIVE
HCT VFR BLD AUTO: 36.7 % (ref 37–48.5)
HGB BLD-MCNC: 11 G/DL (ref 12–16)
HGB UR QL STRIP: NEGATIVE
IMM GRANULOCYTES # BLD AUTO: 0.03 K/UL (ref 0–0.04)
IMM GRANULOCYTES NFR BLD AUTO: 0.3 % (ref 0–0.5)
KETONES UR QL STRIP: ABNORMAL
LEUKOCYTE ESTERASE UR QL STRIP: NEGATIVE
LYMPHOCYTES # BLD AUTO: 1 K/UL (ref 1–4.8)
LYMPHOCYTES NFR BLD: 11.1 % (ref 18–48)
MAGNESIUM SERPL-MCNC: 1.9 MG/DL (ref 1.6–2.6)
MCH RBC QN AUTO: 24.4 PG (ref 27–31)
MCHC RBC AUTO-ENTMCNC: 30 G/DL (ref 32–36)
MCV RBC AUTO: 82 FL (ref 82–98)
MONOCYTES # BLD AUTO: 0.7 K/UL (ref 0.3–1)
MONOCYTES NFR BLD: 8 % (ref 4–15)
NEUTROPHILS # BLD AUTO: 7.4 K/UL (ref 1.8–7.7)
NEUTROPHILS NFR BLD: 79.6 % (ref 38–73)
NITRITE UR QL STRIP: NEGATIVE
NRBC BLD-RTO: 0 /100 WBC
PH UR STRIP: 7 [PH] (ref 5–8)
PLATELET # BLD AUTO: 272 K/UL (ref 150–450)
PMV BLD AUTO: 10.1 FL (ref 9.2–12.9)
POTASSIUM SERPL-SCNC: 3.8 MMOL/L (ref 3.5–5.1)
PROT SERPL-MCNC: 7.5 G/DL (ref 6–8.4)
PROT UR QL STRIP: NEGATIVE
RBC # BLD AUTO: 4.5 M/UL (ref 4–5.4)
SODIUM SERPL-SCNC: 143 MMOL/L (ref 136–145)
SP GR UR STRIP: 1.01 (ref 1–1.03)
URN SPEC COLLECT METH UR: ABNORMAL
UROBILINOGEN UR STRIP-ACNC: NEGATIVE EU/DL
WBC # BLD AUTO: 9.24 K/UL (ref 3.9–12.7)

## 2023-12-19 PROCEDURE — 83735 ASSAY OF MAGNESIUM: CPT | Performed by: EMERGENCY MEDICINE

## 2023-12-19 PROCEDURE — 93010 ELECTROCARDIOGRAM REPORT: CPT | Mod: ,,, | Performed by: INTERNAL MEDICINE

## 2023-12-19 PROCEDURE — 93010 EKG 12-LEAD: ICD-10-PCS | Mod: ,,, | Performed by: INTERNAL MEDICINE

## 2023-12-19 PROCEDURE — 80053 COMPREHEN METABOLIC PANEL: CPT | Performed by: EMERGENCY MEDICINE

## 2023-12-19 PROCEDURE — 93005 ELECTROCARDIOGRAM TRACING: CPT

## 2023-12-19 PROCEDURE — 81003 URINALYSIS AUTO W/O SCOPE: CPT | Performed by: EMERGENCY MEDICINE

## 2023-12-19 PROCEDURE — 85025 COMPLETE CBC W/AUTO DIFF WBC: CPT | Performed by: EMERGENCY MEDICINE

## 2023-12-19 NOTE — ED PROVIDER NOTES
"Encounter Date: 12/18/2023       History     Chief Complaint   Patient presents with    Fall     Non witnessed fall around 1800 in bedroom.  Per family she had slurred speech at the time  currently knows her name age birthday where she lives. Does not remember the fall.   Hx dementia.  Family wants her treated for a stroke.  On eliquist.   Family ( nephew) states she hasn't been 'herself' for months.      Pt with hx of Afib on eliquis, HTN, dementia and CVA here for eval fall today in RR that was unwitnessed. Unknown time on the floor. Last seen by grand daughter around 1400 today. Found on the floor at 1800. Son says she seems a bit more confused than normal and her speech was a little slurred. This has resolved. Pt amnestic of events and unsure why she fell. No HA. Mild pain in her left wrist and knee. Not ambulatory since fall. Family says her dementia has gotten worse the past few months.     The history is provided by the patient and a relative.     Review of patient's allergies indicates:   Allergen Reactions    Sulfa (sulfonamide antibiotics)      Other reaction(s): Rash  Other reaction(s): Dizziness    Collagen      Other reaction(s): burning  Other reaction(s): Swelling  Other reaction(s): Rash  Other reaction(s): Hallucinations    Erythromycin      Other reaction(s): Rash    Quinolones Swelling     Other reaction(s): concern b/o chronic tendonitis    Sulfacetamide sodium Other (See Comments)     "spots in front of my eyes and dizzy"    Sulfasalazine Other (See Comments)     "spots in front of my eyes and dizzy"    Tetracycline      Other reaction(s): Rash     Past Medical History:   Diagnosis Date    A-fib     Amblyopia     right eye    Breast cancer 2005    left    Diarrhea 10/30/2012    Dry eye syndrome     HTN (hypertension) 8/14/2012    Interval gout 8/14/2012    Keratoconus of right eye     Stroke 06/2016    Dr. Rocha     Past Surgical History:   Procedure Laterality Date    APPENDECTOMY      " BARTHOLIN GLAND CYST EXCISION      BREAST LUMPECTOMY Left 2005    CARDIAC CATHETERIZATION      CATARACT EXTRACTION  3/30/09    right eye    CATARACT EXTRACTION  4/13/09    left eye    COLONOSCOPY      ESOPHAGOGASTRODUODENOSCOPY      HYSTERECTOMY  10/22/2015    spine cyst surgery      UPPER GASTROINTESTINAL ENDOSCOPY       Family History   Problem Relation Age of Onset    Diabetes Mother     Diabetes Sister     Heart disease Sister     Cancer Brother     Cancer Paternal Grandmother     Cancer Cousin     Breast cancer Neg Hx     Cervical cancer Neg Hx     Vaginal cancer Neg Hx     Endometrial cancer Neg Hx     Ovarian cancer Neg Hx      Social History     Tobacco Use    Smoking status: Never    Smokeless tobacco: Never   Substance Use Topics    Alcohol use: Yes     Comment: social    Drug use: No     Review of Systems   Musculoskeletal:  Positive for joint swelling.   Neurological:  Positive for speech difficulty.   Psychiatric/Behavioral:  Positive for confusion.    All other systems reviewed and are negative.      Physical Exam     Initial Vitals [12/18/23 2304]   BP Pulse Resp Temp SpO2   122/73 66 18 98.3 °F (36.8 °C) 100 %      MAP       --         Physical Exam    Nursing note and vitals reviewed.  Constitutional: She appears well-developed and well-nourished. She is not diaphoretic. No distress.   HENT:   Head: Normocephalic and atraumatic.   Mouth/Throat: Oropharynx is clear and moist.   Eyes: Conjunctivae and EOM are normal. Pupils are equal, round, and reactive to light. No scleral icterus.   Neck: Neck supple.   Normal range of motion.  Cardiovascular:  Normal rate, regular rhythm, normal heart sounds and intact distal pulses.           Pulmonary/Chest: Breath sounds normal. She exhibits no tenderness.   Abdominal: Abdomen is soft. There is no abdominal tenderness.   Musculoskeletal:         General: Normal range of motion.      Cervical back: Normal range of motion and neck supple.      Comments: Mild  swelling and bruising to left wrist and knee. No crepitus or deformity. No CCE. Otherwise neg MSK exam.      Neurological: She is alert. She has normal strength. No cranial nerve deficit or sensory deficit.   Oriented to person and place. GCS 4 4 6   Skin: Skin is warm and dry. Capillary refill takes less than 2 seconds. No rash noted. No erythema. No pallor.   Psychiatric: She has a normal mood and affect.         ED Course   Procedures  Labs Reviewed   CBC W/ AUTO DIFFERENTIAL - Abnormal; Notable for the following components:       Result Value    Hemoglobin 11.0 (*)     Hematocrit 36.7 (*)     MCH 24.4 (*)     MCHC 30.0 (*)     RDW 16.1 (*)     Gran % 79.6 (*)     Lymph % 11.1 (*)     All other components within normal limits   COMPREHENSIVE METABOLIC PANEL - Abnormal; Notable for the following components:    Glucose 117 (*)     All other components within normal limits   URINALYSIS, REFLEX TO URINE CULTURE - Abnormal; Notable for the following components:    Ketones, UA 2+ (*)     All other components within normal limits    Narrative:     Preferred Collection Type->Urine, Clean Catch  Specimen Source->Urine   MAGNESIUM     EKG Readings: (Independently Interpreted)   Rhythm: Normal Sinus Rhythm. Heart Rate: 66. Ectopy: No Ectopy. Conduction: Normal. ST Segments: Normal ST Segments. Axis: Normal. Clinical Impression: Normal Sinus Rhythm Other Impression: nonspecific T wave abn       Imaging Results              CT Head Without Contrast (In process)                      X-Ray Knee 3 View Left (In process)                      X-Ray Wrist Complete Left (In process)                      X-Ray Chest AP Portable (In process)                      Medications - No data to display  Medical Decision Making  Pt presented after fall at home for unknown reason. Family concerned about possible stroke given her progressively worsening dementia. Pt without complaints other than mild pain in her knee and wrist. No focal deficits  on exam. CT head neg. Xrays of her chest, left wrist and knee all neg. CBC, CMP, UA and Mg unremarkable. EKG with nonspecific T wave abn. Will dc home with family and have them f/u with her PCP in the next week. Acute CVA not suspected.     Amount and/or Complexity of Data Reviewed  Labs: ordered. Decision-making details documented in ED Course.  Radiology: ordered and independent interpretation performed. Decision-making details documented in ED Course.  ECG/medicine tests: ordered and independent interpretation performed. Decision-making details documented in ED Course.               ED Course as of 12/19/23 0223   Tue Dec 19, 2023   0038 Xrays of knee, wrist and chest neg for acute process. My read [DC]   0120 CT head neg per [facility]. [DC]      ED Course User Index  [DC] Marco A Charles Jr., MD                           Clinical Impression:  Final diagnoses:  [R41.82] AMS (altered mental status)  [S69.90XA] Wrist injury  [S89.90XA] Knee injury, initial encounter  [W19.XXXA] Fall, initial encounter (Primary)  [S83.92XA] Sprain of left knee, unspecified ligament, initial encounter  [F03.A0] Mild dementia without behavioral disturbance, psychotic disturbance, mood disturbance, or anxiety, unspecified dementia type          ED Disposition Condition    Discharge Stable          ED Prescriptions    None       Follow-up Information       Follow up With Specialties Details Why Contact Suzy Castillo MD Family Medicine Schedule an appointment as soon as possible for a visit   [address] [facility] [place] [address]  [phone]               Marco A Charles Jr., MD  12/19/23 0223

## 2024-01-22 ENCOUNTER — TELEPHONE (OUTPATIENT)
Dept: FAMILY MEDICINE | Facility: CLINIC | Age: 84
End: 2024-01-22
Payer: MEDICARE

## 2024-01-22 DIAGNOSIS — F03.B0 MODERATE DEMENTIA, UNSPECIFIED DEMENTIA TYPE, UNSPECIFIED WHETHER BEHAVIORAL, PSYCHOTIC, OR MOOD DISTURBANCE OR ANXIETY: Primary | ICD-10-CM

## 2024-01-22 NOTE — TELEPHONE ENCOUNTER
Yonny Pro,  Please review message below from patient's son in regards to Home Health referral.  Call placed to pt's son due to msg left, spoke w/pt's son states have not received any msg in regards referral for Home Health services. Son states pt lives in [place] and the HH from [facility] is closed. Needs something much closer to pt.   Last office visit: 12/07/2023  Thank you.  Signed:  Getachew Gutierrez LPN      ----- Message from Justa Garcia Patient Care Assistant sent at 1/22/2024  9:33 AM CST -----  Regarding: orders  Contact: Uriel bui's son  Type: Needs Medical Advice    Who Called:  Uriel bui's son    Best Call Back Number: [phone] (work)    Additional Information: Uriel bui's son states he would like a callback regarding orders for home health services at the pt's home like cooking and cleaning. Please call to advise. Thanks!

## 2024-01-30 NOTE — TELEPHONE ENCOUNTER
Spoke to [facility]. Pts insurance does not cover their services. Will have to try another facility.

## 2024-01-30 NOTE — TELEPHONE ENCOUNTER
Spoke to pts son Uriel. Faxing  referral to [facility] and well as [facility]. Both facilities are located in [place]. Uriel will have his sister who is here in [place] with pt speak with both facilities to see which one they like better.     Looked further into both facilities. Called both several times, no answer. No website to obtain information either. Called Uriel and LVM letting him know I found a facility called [facility]. They provide the services he is looking for his mother to have. Faxed referral to [phone]. Office is aware this is being faxed.

## 2024-02-05 ENCOUNTER — TELEPHONE (OUTPATIENT)
Dept: FAMILY MEDICINE | Facility: CLINIC | Age: 84
End: 2024-02-05
Payer: MEDICARE

## 2024-02-05 NOTE — TELEPHONE ENCOUNTER
----- Message from Thu Knapp sent at 2/5/2024 11:22 AM CST -----  Contact: XIAOEAGLE  Type:  Needs Medical Advice    Who Called: XIAO, DAUGHTER  Symptoms (please be specific): REQUESTING AN ORDER FOR HER MOTHER  PROLIA INFUSION   Pharmacy name and phone #:    [facility] [place] [facility] [address]  [address]  [place] [address]  Phone: [phone] Fax: [phone]  Would the patient rather a call back or a response via [facility]? CALL   Best Call Back Number:  [phone]  Additional Information: THANK YOU

## 2024-02-22 ENCOUNTER — OFFICE VISIT (OUTPATIENT)
Dept: FAMILY MEDICINE | Facility: CLINIC | Age: 84
End: 2024-02-22
Payer: MEDICARE

## 2024-02-22 VITALS
WEIGHT: 158.88 LBS | HEART RATE: 66 BPM | BODY MASS INDEX: 25.54 KG/M2 | OXYGEN SATURATION: 95 % | DIASTOLIC BLOOD PRESSURE: 72 MMHG | HEIGHT: 66 IN | SYSTOLIC BLOOD PRESSURE: 122 MMHG | RESPIRATION RATE: 18 BRPM

## 2024-02-22 DIAGNOSIS — I48.0 PAROXYSMAL ATRIAL FIBRILLATION: ICD-10-CM

## 2024-02-22 DIAGNOSIS — E78.2 MIXED HYPERLIPIDEMIA: Primary | ICD-10-CM

## 2024-02-22 DIAGNOSIS — I25.118 CORONARY ARTERY DISEASE OF NATIVE ARTERY OF NATIVE HEART WITH STABLE ANGINA PECTORIS: ICD-10-CM

## 2024-02-22 DIAGNOSIS — F03.B0 MODERATE DEMENTIA, UNSPECIFIED DEMENTIA TYPE, UNSPECIFIED WHETHER BEHAVIORAL, PSYCHOTIC, OR MOOD DISTURBANCE OR ANXIETY: ICD-10-CM

## 2024-02-22 DIAGNOSIS — F03.A0 MILD DEMENTIA WITHOUT BEHAVIORAL DISTURBANCE, PSYCHOTIC DISTURBANCE, MOOD DISTURBANCE, OR ANXIETY, UNSPECIFIED DEMENTIA TYPE: ICD-10-CM

## 2024-02-22 DIAGNOSIS — I73.9 PERIPHERAL VASCULAR DISEASE: ICD-10-CM

## 2024-02-22 PROCEDURE — 99214 OFFICE O/P EST MOD 30 MIN: CPT | Mod: S$GLB,,, | Performed by: FAMILY MEDICINE

## 2024-02-22 PROCEDURE — 3288F FALL RISK ASSESSMENT DOCD: CPT | Mod: CPTII,S$GLB,, | Performed by: FAMILY MEDICINE

## 2024-02-22 PROCEDURE — 1159F MED LIST DOCD IN RCRD: CPT | Mod: CPTII,S$GLB,, | Performed by: FAMILY MEDICINE

## 2024-02-22 PROCEDURE — 99999 PR PBB SHADOW E&M-EST. PATIENT-LVL III: CPT | Mod: PBBFAC,,, | Performed by: FAMILY MEDICINE

## 2024-02-22 PROCEDURE — 1101F PT FALLS ASSESS-DOCD LE1/YR: CPT | Mod: CPTII,S$GLB,, | Performed by: FAMILY MEDICINE

## 2024-02-22 PROCEDURE — 3074F SYST BP LT 130 MM HG: CPT | Mod: CPTII,S$GLB,, | Performed by: FAMILY MEDICINE

## 2024-02-22 PROCEDURE — 3078F DIAST BP <80 MM HG: CPT | Mod: CPTII,S$GLB,, | Performed by: FAMILY MEDICINE

## 2024-02-22 RX ORDER — GABAPENTIN 300 MG/1
CAPSULE ORAL
COMMUNITY
Start: 2022-01-03

## 2024-02-22 RX ORDER — MULTIVIT WITH MINERALS/HERBS
1 TABLET ORAL DAILY
COMMUNITY

## 2024-02-22 RX ORDER — MEMANTINE HYDROCHLORIDE 5 MG/1
5 TABLET ORAL
COMMUNITY
Start: 2024-02-19

## 2024-02-22 RX ORDER — PRAVASTATIN SODIUM 40 MG/1
40 TABLET ORAL DAILY
Qty: 28 TABLET | Refills: 11 | Status: SHIPPED | OUTPATIENT
Start: 2024-02-22 | End: 2025-02-21

## 2024-02-22 NOTE — PROGRESS NOTES
Subjective:       Patient ID: Theresa Hook is a 83 y.o. female.    Chief Complaint: Follow-up    Theresa Hook presents to the clinic today with her caregiver for follow up   Previous patient of Dr. Santoyo   She is establishing care with me today    Upcoming appointment for Xavier  Receives every 6 months  Needs routine blood work prior to appointment  7/10/2023- DEXA  FINDINGS:  The L1 to L4 vertebral bone mineral density is equal to 1.345 g/cm squared with a T score of 1.2.  Prior BMD 1.191.  The left femoral neck bone mineral density is equal to 0.890 g/cm squared with a T score of -1.1.  Prior BMD 0.863.  The total hip bone mineral density is equal to 0.926 g/cm squared with a T score of -0.6.  Prior BMD 0.899.  There is a 12.0% risk of a major osteoporotic fracture and a 2.7% risk of hip fracture in the next 10 years (FRAX).  Impression:  Osteopenia.    Bilateral leg weakness- She is following with PT      Patient/Caregiver report no issues, concerns or complaints at today's visit     Follow-up  Pertinent negatives include no abdominal pain, fatigue, fever or rash.     Review of Systems   Constitutional:  Negative for activity change, appetite change, fatigue and fever.   Respiratory:  Negative for shortness of breath.    Gastrointestinal:  Negative for abdominal pain.   Integumentary:  Negative for rash.         Objective:      Physical Exam  Vitals and nursing note reviewed.   Constitutional:       General: She is not in acute distress.     Appearance: She is not ill-appearing.   Cardiovascular:      Rate and Rhythm: Normal rate and regular rhythm.      Heart sounds: No murmur heard.  Pulmonary:      Effort: Pulmonary effort is normal.      Breath sounds: Normal breath sounds. No wheezing.   Skin:     General: Skin is warm and dry.      Findings: No rash.   Neurological:      Mental Status: She is alert.   Psychiatric:         Mood and Affect: Mood normal.         Behavior: Behavior normal.          Assessment:       1. Moderate dementia, unspecified dementia type, unspecified whether behavioral, psychotic, or mood disturbance or anxiety    2. Paroxysmal atrial fibrillation    3. Peripheral vascular disease    4. Mild dementia without behavioral disturbance, psychotic disturbance, mood disturbance, or anxiety, unspecified dementia type    5. Coronary artery disease of native artery of native heart with stable angina pectoris        Plan:       Problem List Items Addressed This Visit          Neuro    Moderate dementia, unspecified dementia type, unspecified whether behavioral, psychotic, or mood disturbance or anxiety     Chronic. Stable. She is unabl;e to treat her sleep apnea            Cardiac/Vascular    Paroxysmal atrial fibrillation     Chronic. Normal rhythm. On amnio.          Coronary artery disease of native artery of native heart with stable angina pectoris     Following with Dr. Penny. No current pain.          Peripheral vascular disease     Chronic. Stable.

## 2024-02-27 RX ORDER — NITROFURANTOIN 25; 75 MG/1; MG/1
CAPSULE ORAL
Qty: 30 CAPSULE | Refills: 0 | Status: SHIPPED | OUTPATIENT
Start: 2024-02-27 | End: 2024-04-11

## 2024-03-19 ENCOUNTER — TELEPHONE (OUTPATIENT)
Dept: FAMILY MEDICINE | Facility: CLINIC | Age: 84
End: 2024-03-19
Payer: MEDICARE

## 2024-03-19 NOTE — TELEPHONE ENCOUNTER
----- Message from Zoila Tomlinson sent at 3/19/2024 10:43 AM CDT -----  Contact: Patient  Type:  Needs Medical Advice    Who Called: Patient's daughter       Would the patient rather a call back or a response via [facility]? Call    Best Call Back Number: [phone] (home)     Additional Information: Patient needs an auth to return cpap machine. Please call to advise

## 2024-03-19 NOTE — LETTER
2024  Regarding:   Theresa Hook  1940  [address]  [place] [address]             [facility]  [address]  [facility] [place] [address]  Phone: [phone]  Fax: [phone] To whom it may concern:    Please accept this letter to return CPAP to [facility] due to patient wishes and noncompliance.      If you have any questions or concerns, please don't hesitate to call.    Sincerely,      Josette Lino MD

## 2024-03-19 NOTE — TELEPHONE ENCOUNTER
"Returned call to patient daughter, Liz.   Liz states," mom refused to use CPAP. [employer] needs approval in order for pt return CPAP" please advise  "

## 2024-03-20 ENCOUNTER — TELEPHONE (OUTPATIENT)
Dept: FAMILY MEDICINE | Facility: CLINIC | Age: 84
End: 2024-03-20
Payer: MEDICARE

## 2024-04-11 RX ORDER — NITROFURANTOIN 25; 75 MG/1; MG/1
CAPSULE ORAL
Qty: 30 CAPSULE | Refills: 0 | Status: SHIPPED | OUTPATIENT
Start: 2024-04-11

## 2024-04-11 NOTE — TELEPHONE ENCOUNTER
Refill Routing Note   Medication(s) are not appropriate for processing by [facility] for the following reason(s):        Outside of protocol    [facility] action(s):  Route             Appointments  past 12m or future 3m with PCP    Date Provider   Last Visit   2/22/2024 Josette Lino MD   Next Visit   6/24/2024 Josette Lino MD   ED visits in past 90 days: 0        Note composed:10:44 AM 04/11/2024

## 2024-04-29 ENCOUNTER — TELEPHONE (OUTPATIENT)
Dept: FAMILY MEDICINE | Facility: CLINIC | Age: 84
End: 2024-04-29
Payer: MEDICARE

## 2024-04-29 NOTE — TELEPHONE ENCOUNTER
Spoke with pt daughter in regards to Cpap letter. Informed daughter letter faxed to [facility] 3/20/24, yet will re-fax. Daughter voiced understanding

## 2024-04-29 NOTE — TELEPHONE ENCOUNTER
----- Message from Jasmin Julian sent at 4/29/2024 12:32 PM CDT -----  Type: Needs Medical Advice  Who Called:  Liz daughter   Best Call Back Number: [phone]   Additional Information: calling about getting the cpap machine sent back

## 2024-05-22 ENCOUNTER — TELEPHONE (OUTPATIENT)
Dept: FAMILY MEDICINE | Facility: CLINIC | Age: 84
End: 2024-05-22
Payer: MEDICARE

## 2024-05-24 ENCOUNTER — HOSPITAL ENCOUNTER (EMERGENCY)
Facility: HOSPITAL | Age: 84
Discharge: HOME | End: 2024-05-24
Attending: EMERGENCY MEDICINE
Payer: MEDICARE

## 2024-05-24 VITALS
SYSTOLIC BLOOD PRESSURE: 156 MMHG | DIASTOLIC BLOOD PRESSURE: 75 MMHG | RESPIRATION RATE: 20 BRPM | TEMPERATURE: 98 F | OXYGEN SATURATION: 98 % | WEIGHT: 158 LBS | HEIGHT: 65 IN | BODY MASS INDEX: 26.33 KG/M2 | HEART RATE: 64 BPM

## 2024-05-24 DIAGNOSIS — R41.0 ACUTE DELIRIUM: ICD-10-CM

## 2024-05-24 DIAGNOSIS — N39.0 URINARY TRACT INFECTION WITHOUT HEMATURIA, SITE UNSPECIFIED: Primary | ICD-10-CM

## 2024-05-24 LAB
ALBUMIN SERPL BCP-MCNC: 3.7 G/DL (ref 3.5–5.2)
ALP SERPL-CCNC: 83 U/L (ref 55–135)
ALT SERPL W/O P-5'-P-CCNC: 16 U/L (ref 10–44)
ANION GAP SERPL CALC-SCNC: 12 MMOL/L (ref 8–16)
AST SERPL-CCNC: 22 U/L (ref 10–40)
BACTERIA #/AREA URNS HPF: ABNORMAL /HPF
BASOPHILS # BLD AUTO: 0.08 K/UL (ref 0–0.2)
BASOPHILS NFR BLD: 1.1 % (ref 0–1.9)
BILIRUB SERPL-MCNC: 0.4 MG/DL (ref 0.1–1)
BILIRUB UR QL STRIP: NEGATIVE
BUN SERPL-MCNC: 7 MG/DL (ref 8–23)
CALCIUM SERPL-MCNC: 9.5 MG/DL (ref 8.7–10.5)
CHLORIDE SERPL-SCNC: 103 MMOL/L (ref 95–110)
CLARITY UR: CLEAR
CO2 SERPL-SCNC: 28 MMOL/L (ref 23–29)
COLOR UR: YELLOW
CREAT SERPL-MCNC: 0.8 MG/DL (ref 0.5–1.4)
DIFFERENTIAL METHOD BLD: ABNORMAL
EOSINOPHIL # BLD AUTO: 0.3 K/UL (ref 0–0.5)
EOSINOPHIL NFR BLD: 3.9 % (ref 0–8)
ERYTHROCYTE [DISTWIDTH] IN BLOOD BY AUTOMATED COUNT: 14 % (ref 11.5–14.5)
EST. GFR  (NO RACE VARIABLE): >60 ML/MIN/1.73 M^2
GLUCOSE SERPL-MCNC: 94 MG/DL (ref 70–110)
GLUCOSE UR QL STRIP: NEGATIVE
HCT VFR BLD AUTO: 41.3 % (ref 37–48.5)
HGB BLD-MCNC: 13.1 G/DL (ref 12–16)
HGB UR QL STRIP: NEGATIVE
IMM GRANULOCYTES # BLD AUTO: 0.02 K/UL (ref 0–0.04)
IMM GRANULOCYTES NFR BLD AUTO: 0.3 % (ref 0–0.5)
KETONES UR QL STRIP: NEGATIVE
LEUKOCYTE ESTERASE UR QL STRIP: ABNORMAL
LYMPHOCYTES # BLD AUTO: 2.7 K/UL (ref 1–4.8)
LYMPHOCYTES NFR BLD: 36.3 % (ref 18–48)
MAGNESIUM SERPL-MCNC: 1.9 MG/DL (ref 1.6–2.6)
MCH RBC QN AUTO: 29.8 PG (ref 27–31)
MCHC RBC AUTO-ENTMCNC: 31.7 G/DL (ref 32–36)
MCV RBC AUTO: 94 FL (ref 82–98)
MICROSCOPIC COMMENT: ABNORMAL
MONOCYTES # BLD AUTO: 0.8 K/UL (ref 0.3–1)
MONOCYTES NFR BLD: 11.4 % (ref 4–15)
NEUTROPHILS # BLD AUTO: 3.5 K/UL (ref 1.8–7.7)
NEUTROPHILS NFR BLD: 47 % (ref 38–73)
NITRITE UR QL STRIP: NEGATIVE
NRBC BLD-RTO: 0 /100 WBC
PH UR STRIP: 7 [PH] (ref 5–8)
PLATELET # BLD AUTO: 254 K/UL (ref 150–450)
PMV BLD AUTO: 10.4 FL (ref 9.2–12.9)
POCT GLUCOSE: 123 MG/DL (ref 70–110)
POTASSIUM SERPL-SCNC: 3.9 MMOL/L (ref 3.5–5.1)
PROT SERPL-MCNC: 7.6 G/DL (ref 6–8.4)
PROT UR QL STRIP: NEGATIVE
RBC # BLD AUTO: 4.4 M/UL (ref 4–5.4)
SODIUM SERPL-SCNC: 143 MMOL/L (ref 136–145)
SP GR UR STRIP: 1.01 (ref 1–1.03)
SQUAMOUS #/AREA URNS HPF: 2 /HPF
TROPONIN I SERPL DL<=0.01 NG/ML-MCNC: <0.006 NG/ML (ref 0–0.03)
URN SPEC COLLECT METH UR: ABNORMAL
UROBILINOGEN UR STRIP-ACNC: 1 EU/DL
WBC # BLD AUTO: 7.35 K/UL (ref 3.9–12.7)
WBC #/AREA URNS HPF: 10 /HPF (ref 0–5)

## 2024-05-24 PROCEDURE — 83735 ASSAY OF MAGNESIUM: CPT | Performed by: EMERGENCY MEDICINE

## 2024-05-24 PROCEDURE — 25000003 PHARM REV CODE 250: Performed by: EMERGENCY MEDICINE

## 2024-05-24 PROCEDURE — 81000 URINALYSIS NONAUTO W/SCOPE: CPT | Performed by: EMERGENCY MEDICINE

## 2024-05-24 PROCEDURE — 84484 ASSAY OF TROPONIN QUANT: CPT | Performed by: EMERGENCY MEDICINE

## 2024-05-24 PROCEDURE — 85025 COMPLETE CBC W/AUTO DIFF WBC: CPT | Performed by: EMERGENCY MEDICINE

## 2024-05-24 PROCEDURE — 70450 CT HEAD/BRAIN W/O DYE: CPT | Mod: 26,,, | Performed by: RADIOLOGY

## 2024-05-24 PROCEDURE — 93005 ELECTROCARDIOGRAM TRACING: CPT

## 2024-05-24 PROCEDURE — 80053 COMPREHEN METABOLIC PANEL: CPT | Performed by: EMERGENCY MEDICINE

## 2024-05-24 PROCEDURE — 93010 ELECTROCARDIOGRAM REPORT: CPT | Mod: ,,, | Performed by: INTERNAL MEDICINE

## 2024-05-24 PROCEDURE — 70450 CT HEAD/BRAIN W/O DYE: CPT | Mod: TC

## 2024-05-24 PROCEDURE — 99284 EMERGENCY DEPT VISIT MOD MDM: CPT | Mod: 25

## 2024-05-24 RX ORDER — CEPHALEXIN 250 MG/1
250 CAPSULE ORAL
Status: COMPLETED | OUTPATIENT
Start: 2024-05-24 | End: 2024-05-24

## 2024-05-24 RX ORDER — CEPHALEXIN 250 MG/1
250 CAPSULE ORAL EVERY 8 HOURS
Qty: 21 CAPSULE | Refills: 0 | Status: SHIPPED | OUTPATIENT
Start: 2024-05-24 | End: 2024-05-31

## 2024-05-24 RX ADMIN — CEPHALEXIN 250 MG: 250 CAPSULE ORAL at 09:05

## 2024-05-24 NOTE — ED PROVIDER NOTES
"   History     Chief Complaint   Patient presents with    Shaking     HPI:  Theresa Hook is a 83 y.o. female with PMH as below who presents to the [facility] emergency department for evaluation of shaking chills/rigors and confusion, per her son Steven via phone. He is concerned about confusion and possibility of UTI. Patient has history of dementia and denies any complaints and is asking to leave. History of frequent prior episodes.     Patient is oriented x1 per family at baseline.       PCP: Josette Lino MD    Review of patient's allergies indicates:   Allergen Reactions    Sulfa (sulfonamide antibiotics)      Other reaction(s): Rash  Other reaction(s): Dizziness    Collagen      Other reaction(s): burning  Other reaction(s): Swelling  Other reaction(s): Rash  Other reaction(s): Hallucinations    Erythromycin      Other reaction(s): Rash    Quinolones Swelling     Other reaction(s): concern b/o chronic tendonitis    Sulfacetamide sodium Other (See Comments)     "spots in front of my eyes and dizzy"    Sulfasalazine Other (See Comments)     "spots in front of my eyes and dizzy"    Tetracycline      Other reaction(s): Rash      Past Medical History:   Diagnosis Date    A-fib     Amblyopia     right eye    Breast cancer 2005    left    Diarrhea 10/30/2012    Dry eye syndrome     HTN (hypertension) 8/14/2012    Interval gout 8/14/2012    Keratoconus of right eye     Stroke 06/2016    Dr. Rocha     Past Surgical History:   Procedure Laterality Date    APPENDECTOMY      BARTHOLIN GLAND CYST EXCISION      BREAST LUMPECTOMY Left 2005    CARDIAC CATHETERIZATION      CATARACT EXTRACTION  3/30/09    right eye    CATARACT EXTRACTION  4/13/09    left eye    COLONOSCOPY      ESOPHAGOGASTRODUODENOSCOPY      HYSTERECTOMY  10/22/2015    spine cyst surgery      UPPER GASTROINTESTINAL ENDOSCOPY         Family History   Problem Relation Name Age of Onset    Diabetes Mother Michelle     Diabetes Sister      Heart " disease Sister      Cancer Brother      Cancer Paternal Grandmother      Cancer Cousin      Breast cancer Neg Hx      Cervical cancer Neg Hx      Vaginal cancer Neg Hx      Endometrial cancer Neg Hx      Ovarian cancer Neg Hx       Social History     Tobacco Use    Smoking status: Never    Smokeless tobacco: Never   Substance and Sexual Activity    Alcohol use: Yes     Comment: social    Drug use: No    Sexual activity: Not Currently     Birth control/protection: Post-menopausal      Review of Systems     Review of Systems   Constitutional:  Positive for chills. Negative for fever.   HENT: Negative.     Eyes: Negative.    Respiratory: Negative.  Negative for cough.    Cardiovascular: Negative.    Gastrointestinal: Negative.    Endocrine: Negative.    Genitourinary: Negative.  Negative for dysuria.   Musculoskeletal: Negative.    Skin: Negative.    Allergic/Immunologic: Negative.    Neurological: Negative.    Hematological: Negative.    Psychiatric/Behavioral: Negative.     All other systems reviewed and are negative.       Physical Exam     Initial Vitals [05/24/24 1543]   BP Pulse Resp Temp SpO2   (!) 154/72 65 18 98 °F (36.7 °C) 97 %      MAP       --          Nursing notes and vital signs reviewed.  Constitutional: Patient is in no acute distress.   Head: Normocephalic. Atraumatic.   Eyes:  Conjunctivae are not pale. No scleral icterus.   ENT: Mucous membranes moist.   Neck: Supple.   Cardiovascular: Regular rate. Regular rhythm. No murmurs, rubs, or gallops   Pulmonary: No respiratory distress. Clear to auscultation bilaterally   Abdominal: Non-distended.   Musculoskeletal: Moves all extremities. No obvious deformities.   Skin: Warm and dry.   Neurological:  Alert, awake, and oriented x1. Normal speech. No acute lateralizing neurologic deficits appreciated. Tremor noted.   Psychiatric: Normal affect.       ED Course   Procedures  Vitals:    05/24/24 1543 05/24/24 1802 05/24/24 1812 05/24/24 2100   BP: (!) 154/72  "(!) 166/70     Pulse: 65 73 65 64   Resp: 18 (!) 25 (!) 25 20   Temp: 98 °F (36.7 °C)      TempSrc: Oral      SpO2: 97% 98% 100% 98%   Weight: 71.7 kg (158 lb)      Height: 5' 5" (1.651 m)       05/24/24 2103   BP: (!) 156/75   Pulse:    Resp:    Temp:    TempSrc:    SpO2:    Weight:    Height:      Lab Results Interpreted as Abnormal:  Labs Reviewed   CBC W/ AUTO DIFFERENTIAL - Abnormal; Notable for the following components:       Result Value    MCHC 31.7 (*)     All other components within normal limits   COMPREHENSIVE METABOLIC PANEL - Abnormal; Notable for the following components:    BUN 7 (*)     All other components within normal limits   URINALYSIS, REFLEX TO URINE CULTURE - Abnormal; Notable for the following components:    Leukocytes, UA Trace (*)     All other components within normal limits    Narrative:     Preferred Collection Type->Urine, Clean Catch  Specimen Source->Urine   URINALYSIS MICROSCOPIC - Abnormal; Notable for the following components:    WBC, UA 10 (*)     Bacteria Few (*)     All other components within normal limits    Narrative:     Preferred Collection Type->Urine, Clean Catch  Specimen Source->Urine   POCT GLUCOSE - Abnormal; Notable for the following components:    POCT Glucose 123 (*)     All other components within normal limits   MAGNESIUM   TROPONIN I      All Lab Results:  Results for orders placed or performed during the hospital encounter of 05/24/24   CBC auto differential   Result Value Ref Range    WBC 7.35 3.90 - 12.70 K/uL    RBC 4.40 4.00 - 5.40 M/uL    Hemoglobin 13.1 12.0 - 16.0 g/dL    Hematocrit 41.3 37.0 - 48.5 %    MCV 94 82 - 98 fL    MCH 29.8 27.0 - 31.0 pg    MCHC 31.7 (L) 32.0 - 36.0 g/dL    RDW 14.0 11.5 - 14.5 %    Platelets 254 150 - 450 K/uL    MPV 10.4 9.2 - 12.9 fL    Immature Granulocytes 0.3 0.0 - 0.5 %    Gran # (ANC) 3.5 1.8 - 7.7 K/uL    Immature Grans (Abs) 0.02 0.00 - 0.04 K/uL    Lymph # 2.7 1.0 - 4.8 K/uL    Mono # 0.8 0.3 - 1.0 K/uL    Eos # " 0.3 0.0 - 0.5 K/uL    Baso # 0.08 0.00 - 0.20 K/uL    nRBC 0 0 /100 WBC    Gran % 47.0 38.0 - 73.0 %    Lymph % 36.3 18.0 - 48.0 %    Mono % 11.4 4.0 - 15.0 %    Eosinophil % 3.9 0.0 - 8.0 %    Basophil % 1.1 0.0 - 1.9 %    Differential Method Automated    Comprehensive metabolic panel   Result Value Ref Range    Sodium 143 136 - 145 mmol/L    Potassium 3.9 3.5 - 5.1 mmol/L    Chloride 103 95 - 110 mmol/L    CO2 28 23 - 29 mmol/L    Glucose 94 70 - 110 mg/dL    BUN 7 (L) 8 - 23 mg/dL    Creatinine 0.8 0.5 - 1.4 mg/dL    Calcium 9.5 8.7 - 10.5 mg/dL    Total Protein 7.6 6.0 - 8.4 g/dL    Albumin 3.7 3.5 - 5.2 g/dL    Total Bilirubin 0.4 0.1 - 1.0 mg/dL    Alkaline Phosphatase 83 55 - 135 U/L    AST 22 10 - 40 U/L    ALT 16 10 - 44 U/L    eGFR >60.0 >60 mL/min/1.73 m^2    Anion Gap 12 8 - 16 mmol/L   Magnesium   Result Value Ref Range    Magnesium 1.9 1.6 - 2.6 mg/dL   Troponin I   Result Value Ref Range    Troponin I <0.006 0.000 - 0.026 ng/mL   Urinalysis, Reflex to Urine Culture Urine, Clean Catch    Specimen: Urine, Clean Catch   Result Value Ref Range    Specimen UA Urine, Unspecified     Color, UA Yellow Yellow, Straw, Loren    Appearance, UA Clear Clear    pH, UA 7.0 5.0 - 8.0    Specific Gravity, UA 1.010 1.005 - 1.030    Protein, UA Negative Negative    Glucose, UA Negative Negative    Ketones, UA Negative Negative    Bilirubin (UA) Negative Negative    Occult Blood UA Negative Negative    Nitrite, UA Negative Negative    Urobilinogen, UA 1.0 Negative EU/dL    Leukocytes, UA Trace (A) Negative   Urinalysis Microscopic   Result Value Ref Range    WBC, UA 10 (H) 0 - 5 /hpf    Bacteria Few (A) None-Occ /hpf    Squam Epithel, UA 2 /hpf    Microscopic Comment SEE COMMENT    POCT glucose   Result Value Ref Range    POCT Glucose 123 (H) 70 - 110 mg/dL     Imaging Results              CT Head Without Contrast (Final result)  Result time 05/24/24 19:22:36      Final result by Brennan Cobb MD (05/24/24  19:22:36)                   Impression:      No acute abnormality.      Electronically signed by: FlorenceVahid Jordyn  Date:    05/24/2024  Time:    19:22               Narrative:    EXAMINATION:  CT HEAD WITHOUT CONTRAST    CLINICAL HISTORY:  Mental status change, unknown cause;    TECHNIQUE:  Low dose axial CT images obtained throughout the head without intravenous contrast. Sagittal and coronal reconstructions were performed.    COMPARISON:  12/19/2023    FINDINGS:  Intracranial compartment:    Ventricles and sulci are normal in size for age without evidence of hydrocephalus. No extra-axial blood or fluid collections.    Moderate sized region of gliosis/encephalomalacia in the right frontotemporal region compatible with sequela of remote MCA infarct.  Moderate chronic microvascular ischemia.  No parenchymal mass, hemorrhage, edema or major vascular distribution infarct.    Skull/extracranial contents (limited evaluation): No fracture. Mastoid air cells and paranasal sinuses are essentially clear.                                     ED Physician's independent review of the above imaging: agree with radiologist, .    The EKG was ordered, reviewed, and independently interpreted by the ED Physician:  Rhythm: normal sinus  Rate:  bpm  No ST-T changes concerning for acute ischemia      The emergency physician reviewed the vital signs and test results, which are outlined above.     ED Discussion      Patient care assumed from  at shift change.  Patient appears to be suffering from urinary tract infection.  No other obvious cause for her confusion.  Will treat with Keflex, 1st dose given here.  I believe he is safe for discharge home.  Discussed all of this with the patient and her nephew.  She will return for any worsening signs or symptoms, otherwise follow-up with PCP.      ED Medication(s) Administered:  Medications   cephALEXin capsule 250 mg (250 mg Oral Given 5/24/24 2103)       Prescription Management:  I performed a review of the patient's current Rx medication list as input by nursing staff.    Discharge Medication List as of 5/24/2024  8:59 PM        START taking these medications    Details   cephALEXin (KEFLEX) 250 MG capsule Take 1 capsule (250 mg total) by mouth every 8 (eight) hours. for 7 days, Starting Fri 5/24/2024, Until Fri 5/31/2024, Print           CONTINUE these medications which have NOT CHANGED    Details   amiodarone (PACERONE) 200 MG Tab Take by mouth once daily., Historical Med      apixaban (ELIQUIS) 2.5 mg Tab Take 2.5 mg by mouth 2 (two) times daily. , Historical Med      b complex vitamins tablet Take 1 tablet by mouth once daily., Historical Med      esomeprazole (NEXIUM) 20 MG capsule Take 20 mg by mouth once daily., Starting Wed 5/13/2020, Historical Med      gabapentin (NEURONTIN) 300 MG capsule Historical Med      memantine (NAMENDA) 5 MG Tab Take 5 mg by mouth., Starting Mon 2/19/2024, Historical Med      nitrofurantoin, macrocrystal-monohydrate, (MACROBID) 100 MG capsule TAKE ONE (1) CAPSULE AT BEDTIME WITH FOOD TO PREVENT URINARY TRACT INFECTIONS, Normal      pravastatin (PRAVACHOL) 40 MG tablet Take 1 tablet (40 mg total) by mouth once daily., Starting Thu 2/22/2024, Until Fri 2/21/2025, Normal               Follow-up Information       Schedule an appointment as soon as possible for a visit  with Josette Lino MD.    Specialty: Family Medicine  Contact information:  [address] [place] [address]  [address]  [facility] [address] [phone]               [facility] Emergency Dept.    Specialty: Emergency Medicine  Why: As needed, If symptoms worsen  Contact information:  [address] [facility] [address] [phone]                          Clinical Impression       ICD-10-CM ICD-9-CM   1. Urinary tract infection without hematuria, site unspecified  N39.0 599.0   2. Acute delirium  R41.0 780.09      ED Disposition Condition    Discharge Stable             Joe Cameron  MD MADELINE  05/26/24 1921

## 2024-05-24 NOTE — ED TRIAGE NOTES
Patient presents to ED POV with c/o shaking for the last two days. She denies headache, chest pain, SOB. Her caregiver reports that she has had shaking before when she had an electrolyte abnormality. She is awake, alert, ambulatory unassisted with steady gait into triage.

## 2024-05-25 NOTE — DISCHARGE INSTRUCTIONS
Your lab work today showed evidence of urinary tract infection.  CT of the brain showed evidence for an old stroke, and age-related changes, but nothing new.  Take Keflex as prescribed for UTI, and follow-up with your primary care provider after the weekend.  Return here for any worsening signs or symptoms over the next few days.

## 2024-05-27 LAB
OHS QRS DURATION: 90 MS
OHS QTC CALCULATION: 471 MS

## 2024-06-05 ENCOUNTER — OFFICE VISIT (OUTPATIENT)
Dept: URGENT CARE | Facility: CLINIC | Age: 84
End: 2024-06-05
Payer: MEDICARE

## 2024-06-05 VITALS
BODY MASS INDEX: 23.72 KG/M2 | TEMPERATURE: 97 F | HEART RATE: 104 BPM | OXYGEN SATURATION: 99 % | RESPIRATION RATE: 18 BRPM | DIASTOLIC BLOOD PRESSURE: 66 MMHG | SYSTOLIC BLOOD PRESSURE: 124 MMHG | WEIGHT: 151.13 LBS | HEIGHT: 67 IN

## 2024-06-05 DIAGNOSIS — M19.072 OSTEOARTHRITIS OF LEFT FOOT, UNSPECIFIED OSTEOARTHRITIS TYPE: ICD-10-CM

## 2024-06-05 DIAGNOSIS — M79.672 LEFT FOOT PAIN: Primary | ICD-10-CM

## 2024-06-05 PROCEDURE — 99213 OFFICE O/P EST LOW 20 MIN: CPT | Mod: S$GLB,,, | Performed by: NURSE PRACTITIONER

## 2024-06-05 NOTE — PATIENT INSTRUCTIONS
You must understand that you've received an Urgent Care treatment only and that you may be released before all your medical problems are known or treated. You, the patient, will arrange for follow up care as instructed.  Follow up with your PCP or specialty clinic as directed in the next 1-2 weeks if not improved or as needed.  You can call [phone] to schedule an appointment with the appropriate provider.  If your condition worsens we recommend that you receive another evaluation at the emergency room immediately or contact your primary medical clinics after hours call service to discuss your concerns.  Please return here or go to the Emergency Department for any concerns or worsening of condition.  Please if you smoke please consider quitting. [facility] Smoke cessation hotline number is [phone], available at this number is free counseling and medications to live a healthier life!       If you were prescribed a narcotic or controlled medication, do not drive or operate heavy equipment or machinery while taking these medications.    If you were not prescribed an antibiotic and your not better please return for a recheck. Antibiotic therapy is not always indicated initially.   Please attempt over the counter medications, give it time and try Echinacea, Zinc and Vitamin C to fight common colds and virus.     Follow up with your doctor or podiatrist of pains continue    Apply a compressive ACE bandage. Rest and elevate the affected painful area.  Apply cold compresses intermittently as needed.  As pain recedes, begin normal activities slowly as tolerated.  Call if symptoms persist.

## 2024-06-05 NOTE — PROGRESS NOTES
"Subjective:       Patient ID: Theresa Hook is a 83 y.o. female.    Vitals:  height is 5' 7" (1.702 m) and weight is 68.6 kg (151 lb 2 oz). Her oral temperature is 97.1 °F (36.2 °C). Her blood pressure is 124/66 and her pulse is 104. Her respiration is 18 and oxygen saturation is 99%.     Chief Complaint: No chief complaint on file.    This is a 83 y.o. female who presents today with a chief complaint of left foot pain x 6 days, denies any injury.  States now she's starting to get pain in the right foot as well. She tried elevation with no relief.  No chief complaint on file.         ROS        Objective:      Physical Exam   Constitutional: She is oriented to person, place, and time. She appears well-developed.   HENT:   Head: Normocephalic and atraumatic.   Ears:   Right Ear: External ear normal.   Left Ear: External ear normal.   Mouth/Throat: Mucous membranes are normal.   Eyes: Conjunctivae and lids are normal.   Neck: Trachea normal. Neck supple.   Cardiovascular: Normal rate, regular rhythm and normal heart sounds.   Pulmonary/Chest: Effort normal and breath sounds normal. No respiratory distress.   Abdominal: Normal appearance and bowel sounds are normal. She exhibits no distension and no mass. Soft. There is no abdominal tenderness.   Musculoskeletal: Normal range of motion.         General: Normal range of motion.   Neurological: She is alert and oriented to person, place, and time. She has normal strength.   Skin: Skin is warm, dry, intact, not diaphoretic and not pale.   Psychiatric: Her speech is normal and behavior is normal. Judgment and thought content normal.   Nursing note and vitals reviewed.        Past medical history and current medications reviewed.     FINDINGS:  No displaced fracture.  Plantar calcaneal spurs.  Flexion deformity of several toes although exam is not weight-bearing.  Scattered mild degenerative change in the mid and forefoot.  Assessment:           1. Left foot pain  "   2. Osteoarthritis of left foot, unspecified osteoarthritis type              Plan:         Left foot pain  -     X-Ray Foot Complete 3 view Left; Future; Expected date: 06/05/2024    Osteoarthritis of left foot, unspecified osteoarthritis type             Patient Instructions     You must understand that you've received an Urgent Care treatment only and that you may be released before all your medical problems are known or treated. You, the patient, will arrange for follow up care as instructed.  Follow up with your PCP or specialty clinic as directed in the next 1-2 weeks if not improved or as needed.  You can call [phone] to schedule an appointment with the appropriate provider.  If your condition worsens we recommend that you receive another evaluation at the emergency room immediately or contact your primary medical clinics after hours call service to discuss your concerns.  Please return here or go to the Emergency Department for any concerns or worsening of condition.  Please if you smoke please consider quitting. [facility] cessation hotline number is [phone], available at this number is free counseling and medications to live a healthier life!       If you were prescribed a narcotic or controlled medication, do not drive or operate heavy equipment or machinery while taking these medications.    If you were not prescribed an antibiotic and your not better please return for a recheck. Antibiotic therapy is not always indicated initially.   Please attempt over the counter medications, give it time and try Echinacea, Zinc and Vitamin C to fight common colds and virus.     Follow up with your doctor or podiatrist of pains continue    Apply a compressive ACE bandage. Rest and elevate the affected painful area.  Apply cold compresses intermittently as needed.  As pain recedes, begin normal activities slowly as tolerated.  Call if symptoms persist.

## 2024-06-06 ENCOUNTER — TELEPHONE (OUTPATIENT)
Dept: FAMILY MEDICINE | Facility: CLINIC | Age: 84
End: 2024-06-06
Payer: MEDICARE

## 2024-06-06 NOTE — TELEPHONE ENCOUNTER
Returned call to Steven in regards to medical records. No answer. LVM    Can  medical records from medical record department.

## 2024-06-06 NOTE — TELEPHONE ENCOUNTER
----- Message from Michelle Michaela sent at 6/6/2024  8:27 AM CDT -----  Regarding: advice  Contact: Steven pena  Type: Needs Medical Advice  Who Called:  Steven pena  Symptoms (please be specific):    How long has patient had these symptoms:    Pharmacy name and phone #:    Best Call Back Number: [phone]  Additional Information: Steven states he needs to  medical records stating patient has nerve pain.  Also, patient's mind is still off the wall.  He would also like another urine sample.  Please call Steven to advise.  Thanks!

## 2024-06-06 NOTE — TELEPHONE ENCOUNTER
----- Message from Alba Fabian sent at 6/6/2024  9:49 AM CDT -----  Regarding: advise  Contact: pt  Type: Needs Medical Advice  Who Called:  patient   Symptoms (please be specific):    How long has patient had these symptoms:    Pharmacy name and phone #:    Best Call Back Number:[phone]    Additional Information: hey pt wants to know if she can go have urine sample done medication is finished but she is not feeling better MRN: [id number] NANCY BAGLEY

## 2024-06-07 ENCOUNTER — TELEPHONE (OUTPATIENT)
Dept: FAMILY MEDICINE | Facility: CLINIC | Age: 84
End: 2024-06-07
Payer: MEDICARE

## 2024-06-07 ENCOUNTER — CLINICAL SUPPORT (OUTPATIENT)
Dept: FAMILY MEDICINE | Facility: CLINIC | Age: 84
End: 2024-06-07
Payer: MEDICARE

## 2024-06-07 DIAGNOSIS — F03.A0 MILD DEMENTIA WITHOUT BEHAVIORAL DISTURBANCE, PSYCHOTIC DISTURBANCE, MOOD DISTURBANCE, OR ANXIETY, UNSPECIFIED DEMENTIA TYPE: ICD-10-CM

## 2024-06-07 DIAGNOSIS — F03.A0 MILD DEMENTIA WITHOUT BEHAVIORAL DISTURBANCE, PSYCHOTIC DISTURBANCE, MOOD DISTURBANCE, OR ANXIETY, UNSPECIFIED DEMENTIA TYPE: Primary | ICD-10-CM

## 2024-06-07 DIAGNOSIS — R30.0 DYSURIA: ICD-10-CM

## 2024-06-07 NOTE — TELEPHONE ENCOUNTER
Pt unable to collect sample in clinic, instructed for collect at home and provided necessary materials for it. Per Dr. Lino's orders pt will pick urine sample at home.

## 2024-06-07 NOTE — TELEPHONE ENCOUNTER
----- Message from Kasia Hernandez, Patient Care Assistant sent at 6/7/2024  1:41 PM CDT -----  PT's care giver came in with PT today for a urine collect per PT's newphew (Steven Yanez) and to get medical records. Caregiver (Rachel Jennings [phone]. Can someone call PT had a UTI a few weeks ago.

## 2024-06-07 NOTE — TELEPHONE ENCOUNTER
"Spoke with pt Steven in regards   Steven, [facility]. Steven states, ' 2 weeks ago I brought her to the ER, had UTI. Abx completed. Feel she has infection as she is still acting valery " requested repeat UA, pended to MD  "

## 2024-06-10 LAB
BILIRUB UR QL STRIP: NEGATIVE
CLARITY UR: ABNORMAL
COLOR UR: YELLOW
GLUCOSE UR QL STRIP: NEGATIVE
HGB UR QL STRIP: NEGATIVE
KETONES UR QL STRIP: NEGATIVE
LEUKOCYTE ESTERASE UR QL STRIP: NEGATIVE
NITRITE UR QL STRIP: NEGATIVE
PH UR STRIP: 7 [PH] (ref 5–8)
PROT UR QL STRIP: NEGATIVE
SP GR UR STRIP: 1.02 (ref 1–1.03)
URN SPEC COLLECT METH UR: ABNORMAL
UROBILINOGEN UR STRIP-ACNC: ABNORMAL EU/DL

## 2024-06-14 ENCOUNTER — TELEPHONE (OUTPATIENT)
Dept: FAMILY MEDICINE | Facility: CLINIC | Age: 84
End: 2024-06-14
Payer: MEDICARE

## 2024-06-14 NOTE — TELEPHONE ENCOUNTER
----- Message from Shirley Ramirez sent at 6/14/2024  3:28 PM CDT -----  Contact: Rachel  Type:  Patient Returning Call    Who Called:  Rachel   Who Left Message for Patient:  Arlen  Does the patient know what this is regarding?:  results   Best Call Back Number:  [phone]  Additional Information:  thanks

## 2024-06-14 NOTE — TELEPHONE ENCOUNTER
----- Message from Marco A Rowley sent at 6/14/2024 10:55 AM CDT -----  Regarding: Test Results  Contact: care giver  Type:  Test Results    Who Called: Pts caregiver Rachel    Name of Test (Lab/Mammo/Etc): labs/ urine    Date of Test: 6/10    Ordering Provider: Holland    Where the test was performed:  [facility]    Would the patient rather a call back or a response via [facility]? Call back    Best Call Back Number: [phone]    Additional Information:  Sts she had the appt time wrote down wrong and missed her appt.  Needs a call back to advise of medication and issues with labs/ urine as the next avail appt isn't until the end of the month.      Please advise -- Thank you

## 2024-06-17 ENCOUNTER — OFFICE VISIT (OUTPATIENT)
Dept: FAMILY MEDICINE | Facility: CLINIC | Age: 84
End: 2024-06-17
Payer: MEDICARE

## 2024-06-17 VITALS
SYSTOLIC BLOOD PRESSURE: 116 MMHG | RESPIRATION RATE: 16 BRPM | OXYGEN SATURATION: 97 % | DIASTOLIC BLOOD PRESSURE: 74 MMHG | BODY MASS INDEX: 23.54 KG/M2 | HEIGHT: 67 IN | HEART RATE: 54 BPM | WEIGHT: 150 LBS

## 2024-06-17 DIAGNOSIS — F03.B0 MODERATE DEMENTIA, UNSPECIFIED DEMENTIA TYPE, UNSPECIFIED WHETHER BEHAVIORAL, PSYCHOTIC, OR MOOD DISTURBANCE OR ANXIETY: ICD-10-CM

## 2024-06-17 DIAGNOSIS — I48.0 PAROXYSMAL ATRIAL FIBRILLATION: ICD-10-CM

## 2024-06-17 DIAGNOSIS — Z74.09 IMPAIRED FUNCTIONAL MOBILITY, BALANCE, GAIT, AND ENDURANCE: ICD-10-CM

## 2024-06-17 DIAGNOSIS — N39.0 CHRONIC UTI (URINARY TRACT INFECTION): Primary | ICD-10-CM

## 2024-06-17 PROCEDURE — 3074F SYST BP LT 130 MM HG: CPT | Mod: CPTII,S$GLB,, | Performed by: NURSE PRACTITIONER

## 2024-06-17 PROCEDURE — 1101F PT FALLS ASSESS-DOCD LE1/YR: CPT | Mod: CPTII,S$GLB,, | Performed by: NURSE PRACTITIONER

## 2024-06-17 PROCEDURE — 3288F FALL RISK ASSESSMENT DOCD: CPT | Mod: CPTII,S$GLB,, | Performed by: NURSE PRACTITIONER

## 2024-06-17 PROCEDURE — 1126F AMNT PAIN NOTED NONE PRSNT: CPT | Mod: CPTII,S$GLB,, | Performed by: NURSE PRACTITIONER

## 2024-06-17 PROCEDURE — 3078F DIAST BP <80 MM HG: CPT | Mod: CPTII,S$GLB,, | Performed by: NURSE PRACTITIONER

## 2024-06-17 PROCEDURE — 99214 OFFICE O/P EST MOD 30 MIN: CPT | Mod: S$GLB,,, | Performed by: NURSE PRACTITIONER

## 2024-06-17 PROCEDURE — 1159F MED LIST DOCD IN RCRD: CPT | Mod: CPTII,S$GLB,, | Performed by: NURSE PRACTITIONER

## 2024-06-17 PROCEDURE — 99999 PR PBB SHADOW E&M-EST. PATIENT-LVL III: CPT | Mod: PBBFAC,,, | Performed by: NURSE PRACTITIONER

## 2024-06-17 NOTE — PROGRESS NOTES
Subjective:       Patient ID: Theresa Hook is a 83 y.o. female.    Chief Complaint: Follow-up (4 month f/u - Results Review)    Theresa Hook presents to the clinic today with caregiver for generalized follow up     Under the care of the following:  PCP: Dr. Lino  Orthopedics: Guera Alamo PA-C- [facility]   Cardiology:Dr. Hardy Penny  Neurology: Dr. Rocha     Was seen recently at the ER (5/24/2024) for shaking, confusion. Was treated for UTI  Chief Complaint  Patient presents with  · Shaking     HPI:  Theresa Hook is a 83 y.o. female with PMH as below who presents to the [facility] emergency department for evaluation of shaking chills/rigors and confusion, per her son Steven via phone. He is concerned about confusion and possibility of UTI. Patient has history of dementia and denies any complaints and is asking to leave. History of frequent prior episodes.      Patient is oriented x1 per family at baseline.        PCP: Josette Lino MD  Patient care assumed from  at shift change.  Patient appears to be suffering from urinary tract infection.  No other obvious cause for her confusion.  Will treat with Keflex, 1st dose given here.  I believe he is safe for discharge home.  Discussed all of this with the patient and her nephew.  She will return for any worsening signs or symptoms, otherwise follow-up with PCP.      ED Medication(s) Administered:  Medications  cephALEXin capsule 250 mg (250 mg Oral Given 5/24/24 2103)  START taking these medications    Details  cephALEXin (KEFLEX) 250 MG capsule Take 1 capsule (250 mg total) by mouth every 8 (eight) hours. for 7 days, Starting Fri 5/24/2024, Until Fri 5/31/2024,   Reports completed antibiotic as prescribed. Denies any UTI symptoms.  Caregiver reports that she still has some intermittent episodes of shaking of the hands or at times the head.     Has recently been experiencing change to mobility due to foot pain.  Is  followed with Orthopedics.     Assessment and Plan  Title: Left foot pain Author: Guera Alamo Date: 6/11/24    Assessment and Plan  Decreased pedal pulses (Other specified symptoms and signs involving the circulatory and respiratory systems, R09.89) .  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  Bracing recommended, I think surgery might be difficult for this patient who is having significant memory issues.  Insufficiency of right posterior tibial tendon (Posterior tibial tendinitis, right leg, M76.821) .  I am recommending bracing, sending the patient for evaluation with [facility] orth[facility] and brace  Varicose veins of foot (Asymptomatic varicose veins of unspecified lower extremity, I83.90) .  Referring for vascular eval with [facility]  Orders:  XR Foot Complete Right (Routine), 06/11/24 11:27:00 CDT, Routine, Stop date 06/11/24 11:27:00 CDT, Reason: Swelling ankle, Weight bearing, Transport Mode: Wheelchair, Patient has IV, Posterior tibialis tendon insufficiency, 68.3 kg  The patient needs bilateral custom braces/ankle-foot orthoses.  Patient ambulates with an assistive device and is somewhat active.  Patient requires custom molded and fabricated brace due to abnormal anatomy in the affected joint recurring motion restriction in the sagittal, coronal, and transverse planes which cannot be accommodated by an off-the-shelf brace            Review of Systems   Constitutional:  Positive for activity change. Negative for appetite change, fatigue and fever.   Eyes:  Negative for pain and visual disturbance.   Respiratory:  Negative for cough, chest tightness and shortness of breath.    Cardiovascular:  Negative for chest pain, palpitations and leg swelling.   Gastrointestinal:  Negative for abdominal pain, nausea and vomiting.   Genitourinary:  Negative for difficulty urinating and pelvic pain.   Musculoskeletal:  Positive for  arthralgias and gait problem.   Skin: Negative.  Negative for wound.   Neurological:  Positive for tremors. Negative for dizziness and headaches.   Hematological: Negative.    Psychiatric/Behavioral:  Negative for agitation and confusion.        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    [place] (hard of hearing)    Atrophic vaginitis    Mixed stress and urge urinary incontinence    S/P laparoscopic assisted vaginal hysterectomy (LAVH),     Nocturia    Personal history of breast cancer, 2005    Pelvic floor weakness    Imbalance due to old stroke    Need for Tdap vaccination    Need for vaccination with 13-polyvalent pneumococcal conjugate vaccine    Paroxysmal atrial fibrillation    Old embolic stroke without late effect, assoc a fib     S/P coronary artery stent placement,     Amnestic MCI (mild cognitive impairment with memory loss)    H/O: stroke,     Gait instability    Family history of Charcot foot disease    Encounter for screening mammogram for malignant neoplasm of breast    Abdominal bloating    Frequent UTI    Irregular bowel habits    Anticoagulant long-term use, apixiban    Coronary artery disease of native artery of native heart with stable angina pectoris    S/P angioplasty with stent,     Keratoconus of right eye    ABMD (anterior basement membrane dystrophy)    Pseudophakia of both eyes    Osteopenia of both hips    At risk of fracture due to osteoporosis    Gastroesophageal reflux disease    Diverticula of colon    Moderate dementia, unspecified dementia type, unspecified whether behavioral, psychotic, or mood disturbance or anxiety    Acute bronchitis    Ingrown nail    Peripheral vascular disease       Objective:      Physical Exam  Vitals and nursing note reviewed.   Constitutional:       General: She is not in acute distress.     Appearance: Normal appearance. She is  "not ill-appearing.      Comments: Ambulates with cane    Cardiovascular:      Rate and Rhythm: Normal rate and regular rhythm.      Heart sounds: Normal heart sounds. No murmur heard.  Pulmonary:      Effort: Pulmonary effort is normal. No respiratory distress.      Breath sounds: Normal breath sounds. No wheezing.   Skin:     General: Skin is warm and dry.      Findings: No rash.   Neurological:      Mental Status: She is alert and oriented to person, place, and time.   Psychiatric:         Attention and Perception: Attention normal.         Mood and Affect: Mood normal.         Behavior: Behavior normal.         Lab Results   Component Value Date    WBC 7.35 05/24/2024    HGB 13.1 05/24/2024    HCT 41.3 05/24/2024     05/24/2024    CHOL 209 (H) 05/22/2023    TRIG 76 05/22/2023    HDL 72 05/22/2023    ALT 16 05/24/2024    AST 22 05/24/2024     05/24/2024    K 3.9 05/24/2024     05/24/2024    CREATININE 0.8 05/24/2024    BUN 7 (L) 05/24/2024    CO2 28 05/24/2024    TSH 2.095 05/22/2023    HGBA1C 5.4 04/25/2017     The ASCVD Risk score (Rupert DK, et al., 2019) failed to calculate for the following reasons:    The 2019 ASCVD risk score is only valid for ages 40 to 79    The patient has a prior MI or stroke diagnosis  Visit Vitals  /74 (BP Location: Left arm, Patient Position: Sitting, BP Method: Medium (Manual))   Pulse (!) 54   Resp 16   Ht 5' 7" (1.702 m)   Wt 68 kg (150 lb)   SpO2 97%   BMI 23.49 kg/m²      Assessment:       1. Chronic UTI (urinary tract infection)    2. Moderate dementia, unspecified dementia type, unspecified whether behavioral, psychotic, or mood disturbance or anxiety    3. Impaired functional mobility, balance, gait, and endurance    4. Paroxysmal atrial fibrillation        Plan:       1. Chronic UTI (urinary tract infection)  -     Urinalysis; Future; Expected date: 06/17/2024  -     Urine culture  Repeat UA/Culture   2. Moderate dementia, unspecified dementia type, " unspecified whether behavioral, psychotic, or mood disturbance or anxiety  Followed by Neurology  Continue current medication regimen   3. Impaired functional mobility, balance, gait, and endurance  DME   Followed by orthopedics   4. Paroxysmal atrial fibrillation  Followed by Cardiology keep all scheduled follow up appointments   Continue current medication regimen      Follow up in about 6 months (around 12/17/2024).

## 2024-06-26 ENCOUNTER — TELEPHONE (OUTPATIENT)
Dept: FAMILY MEDICINE | Facility: CLINIC | Age: 84
End: 2024-06-26
Payer: MEDICARE

## 2024-06-26 RX ORDER — NITROFURANTOIN 25; 75 MG/1; MG/1
100 CAPSULE ORAL NIGHTLY PRN
Qty: 30 CAPSULE | Refills: 0 | Status: SHIPPED | OUTPATIENT
Start: 2024-06-26

## 2024-06-26 NOTE — TELEPHONE ENCOUNTER
Returned call to Steven with [facility]  Steven requested refill on macrobid to sent into pharmacy as preventative.

## 2024-06-26 NOTE — TELEPHONE ENCOUNTER
----- Message from Jose Dorman sent at 6/26/2024 12:33 PM CDT -----  Type: Needs Medical Advice    Who Called:  Steven from [place]    Pharmacy name and phone #:    [facility] - [place], [place] - [address]  [address]  [place] [address]  Phone: [phone] Fax: [phone]    Additional Information: Steven returned call to Haritha.  Please call back to advise, Thanks!

## 2024-06-26 NOTE — TELEPHONE ENCOUNTER
----- Message from Angie Jordan sent at 6/26/2024 12:07 PM CDT -----  Contact: Steven [phone]  Type:  RX Refill Request    Who Called:  Steven garcia/ [facility]   Refill or New Rx:  refill  RX Name and Strength:  nitrofurantoin, macrocrystal-monohydrate, (MACROBID) 100 MG capsule   How is the patient currently taking it? (ex. 1XDay):  1xday PRN  Is this a 30 day or 90 day RX:  90  Preferred Pharmacy with phone number:    [facility] - [place] - [address]  [address]  [place] [address]  Phone: [phone] Fax: [phone]    Local or Mail Order:  Local   Ordering Provider:  Holland Alva Call Back Number:  [phone]    Additional Information:  Steven stated office keeps sending something back to pharmacy stating pt has has refills but nothing is on file. Pls call back if needed.

## 2024-06-26 NOTE — TELEPHONE ENCOUNTER
Returned call TO Steven at [facility]. Steven stepped out pharmacy. Left message to contact office

## 2024-07-03 ENCOUNTER — PATIENT MESSAGE (OUTPATIENT)
Dept: FAMILY MEDICINE | Facility: CLINIC | Age: 84
End: 2024-07-03
Payer: MEDICARE

## 2024-07-05 ENCOUNTER — OFFICE VISIT (OUTPATIENT)
Dept: FAMILY MEDICINE | Facility: CLINIC | Age: 84
End: 2024-07-05
Payer: MEDICARE

## 2024-07-05 DIAGNOSIS — R25.1 SHAKING: Primary | ICD-10-CM

## 2024-07-05 NOTE — PROGRESS NOTES
The patient location is: [place]  The chief complaint leading to consultation is: Shaking    Visit type: audiovisual    Face to Face time with patient: 11m 28s    12 minutes of total time spent on the encounter, which includes face to face time and non-face to face time preparing to see the patient (eg, review of tests), Obtaining and/or reviewing separately obtained history, Documenting clinical information in the electronic or other health record, Independently interpreting results (not separately reported) and communicating results to the patient/family/caregiver, or Care coordination (not separately reported).         Each patient to whom he or she provides medical services by telemedicine is:  (1) informed of the relationship between the physician and patient and the respective role of any other health care provider with respect to management of the patient; and (2) notified that he or she may decline to receive medical services by telemedicine and may withdraw from such care at any time.    Notes:  Subjective:       Patient ID: Theresa Hook is a 83 y.o. female.    Chief Complaint: No chief complaint on file.      Two distinct ED visits with shaking.     The first time a few months ago she went to the ED and she was shaking and she had a UTI.     The second time she went and they thought it would be an infection.     [facility] and they were talking about it possibly being a progression of dementia.     She tells me that she does not desire physical therapy at this time.     No more shaking since that visit but she has been a lot more tired.     She has been sleeping nearly 20 hours a day. Yesterday was a good day but she is tired again today.           Review of Systems   Constitutional:  Positive for unexpected weight change. Negative for activity change.   HENT:  Negative for hearing loss, rhinorrhea and trouble swallowing.    Eyes:  Negative for discharge and  visual disturbance.   Respiratory:  Negative for chest tightness and wheezing.    Cardiovascular:  Negative for chest pain and palpitations.   Gastrointestinal:  Negative for blood in stool, constipation, diarrhea and vomiting.   Endocrine: Negative for polydipsia and polyuria.   Genitourinary:  Negative for difficulty urinating, dysuria, hematuria and menstrual problem.   Musculoskeletal:  Negative for arthralgias, joint swelling and neck pain.   Neurological:  Negative for weakness and headaches.   Psychiatric/Behavioral:  Positive for confusion. Negative for dysphoric mood.          Objective:      Physical Exam  Constitutional:       General: She is not in acute distress.     Appearance: Normal appearance. She is not ill-appearing.      Comments: Sitting on couch looking through paper- Caregiver is present   Pulmonary:      Effort: Pulmonary effort is normal. No respiratory distress.   Neurological:      Mental Status: She is alert.   Psychiatric:         Mood and Affect: Mood normal.         Behavior: Behavior normal.         Thought Content: Thought content normal.         Judgment: Judgment normal.         Assessment:       1. Shaking        Plan:       Problem List Items Addressed This Visit    None  Visit Diagnoses       Shaking    -  Primary    resolved. No clear cause. Patient does not desire PT at this time.

## 2024-08-05 ENCOUNTER — TELEPHONE (OUTPATIENT)
Dept: FAMILY MEDICINE | Facility: CLINIC | Age: 84
End: 2024-08-05
Payer: MEDICARE

## 2024-08-05 DIAGNOSIS — Z12.31 SCREENING MAMMOGRAM FOR HIGH-RISK PATIENT: ICD-10-CM

## 2024-08-05 DIAGNOSIS — Z85.3 PERSONAL HISTORY OF BREAST CANCER: Primary | ICD-10-CM

## 2024-08-09 ENCOUNTER — TELEPHONE (OUTPATIENT)
Dept: FAMILY MEDICINE | Facility: CLINIC | Age: 84
End: 2024-08-09
Payer: MEDICARE

## 2024-08-15 ENCOUNTER — OFFICE VISIT (OUTPATIENT)
Dept: FAMILY MEDICINE | Facility: CLINIC | Age: 84
End: 2024-08-15
Payer: MEDICARE

## 2024-08-15 VITALS
OXYGEN SATURATION: 96 % | RESPIRATION RATE: 18 BRPM | HEIGHT: 67 IN | HEART RATE: 88 BPM | DIASTOLIC BLOOD PRESSURE: 70 MMHG | SYSTOLIC BLOOD PRESSURE: 130 MMHG | WEIGHT: 152.81 LBS | BODY MASS INDEX: 23.98 KG/M2

## 2024-08-15 DIAGNOSIS — L98.9 SKIN LESION: Primary | ICD-10-CM

## 2024-08-15 DIAGNOSIS — L21.9 SEBORRHEIC DERMATITIS: ICD-10-CM

## 2024-08-15 PROCEDURE — 3288F FALL RISK ASSESSMENT DOCD: CPT | Mod: CPTII,S$GLB,, | Performed by: FAMILY MEDICINE

## 2024-08-15 PROCEDURE — 99213 OFFICE O/P EST LOW 20 MIN: CPT | Mod: S$GLB,,, | Performed by: FAMILY MEDICINE

## 2024-08-15 PROCEDURE — 3078F DIAST BP <80 MM HG: CPT | Mod: CPTII,S$GLB,, | Performed by: FAMILY MEDICINE

## 2024-08-15 PROCEDURE — 1101F PT FALLS ASSESS-DOCD LE1/YR: CPT | Mod: CPTII,S$GLB,, | Performed by: FAMILY MEDICINE

## 2024-08-15 PROCEDURE — 3075F SYST BP GE 130 - 139MM HG: CPT | Mod: CPTII,S$GLB,, | Performed by: FAMILY MEDICINE

## 2024-08-15 PROCEDURE — 99999 PR PBB SHADOW E&M-EST. PATIENT-LVL IV: CPT | Mod: PBBFAC,,, | Performed by: FAMILY MEDICINE

## 2024-08-15 PROCEDURE — 1159F MED LIST DOCD IN RCRD: CPT | Mod: CPTII,S$GLB,, | Performed by: FAMILY MEDICINE

## 2024-08-15 PROCEDURE — 1160F RVW MEDS BY RX/DR IN RCRD: CPT | Mod: CPTII,S$GLB,, | Performed by: FAMILY MEDICINE

## 2024-08-15 NOTE — PATIENT INSTRUCTIONS
[facility]   Address: [address], [place] [address]  Hours: Open ? Closes 5?PM  Phone: [phone]    Thank you for allowing me to participate in your care today. It is an honor to be a part of your healthcare team at [facility]. If you had labs ordered today, you will receive notification via [facility], phone call or mailed letter regarding your results within 7 days. If you have any questions or concerns regarding your visit today, please do not hesitate to contact us.  Sincerely,   Josette Lino M.D.

## 2024-08-15 NOTE — PROGRESS NOTES
Subjective:       Patient ID: Theresa Hook is a 83 y.o. female.    Chief Complaint: Skin tag removal (And rash behind both ear going onto her neck with no pain or itching.)    Presents today for follow up.   Has a skin lesion on the right cheek that she is concerned about and a rash behind her bilateral ears.         .  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    [place] (hard of hearing)    Atrophic vaginitis    Mixed stress and urge urinary incontinence    S/P laparoscopic assisted vaginal hysterectomy (LAVH),     Nocturia    Personal history of breast cancer, 2005    Pelvic floor weakness    Imbalance due to old stroke    Need for Tdap vaccination    Need for vaccination with 13-polyvalent pneumococcal conjugate vaccine    Paroxysmal atrial fibrillation    Old embolic stroke without late effect, assoc a fib     S/P coronary artery stent placement,     Amnestic MCI (mild cognitive impairment with memory loss)    H/O: stroke,     Gait instability    Family history of Charcot foot disease    Encounter for screening mammogram for malignant neoplasm of breast    Abdominal bloating    Frequent UTI    Irregular bowel habits    Anticoagulant long-term use, apixiban    Coronary artery disease of native artery of native heart with stable angina pectoris    S/P angioplasty with stent,     Keratoconus of right eye    ABMD (anterior basement membrane dystrophy)    Pseudophakia of both eyes    Osteopenia of both hips    At risk of fracture due to osteoporosis    Gastroesophageal reflux disease    Diverticula of colon    Moderate dementia, unspecified dementia type, unspecified whether behavioral, psychotic, or mood disturbance or anxiety    Acute bronchitis    Ingrown nail    Peripheral vascular disease     Theresa has a current medication list which includes the following prescription(s):  amiodarone, apixaban, b complex vitamins, esomeprazole, gabapentin, memantine, nitrofurantoin (macrocrystal-monohydrate), and pravastatin.    Review of Systems   Constitutional:  Negative for activity change, appetite change, fatigue and fever.   Respiratory:  Negative for shortness of breath.    Gastrointestinal:  Negative for abdominal pain.   Integumentary:  Positive for mole/lesion. Negative for rash.         Health Maintenance Due   Topic Date Due    TETANUS VACCINE  Never done    RSV Vaccine (Age 60+ and Pregnant patients) (1 - 1-dose 60+ series) Never done    Shingles Vaccine (1 of 2) 01/20/2015    COVID-19 Vaccine (3 - Mixed Product risk series) 04/12/2021    Lipid Panel  05/22/2024      Health Maintenance reviewed and discussed- Nothing additional desired today.   Objective:      Physical Exam  Vitals and nursing note reviewed.   Constitutional:       General: She is not in acute distress.     Appearance: Normal appearance. She is not ill-appearing.   Pulmonary:      Effort: Pulmonary effort is normal.   Skin:     General: Skin is warm and dry.      Findings: Lesion (Small 0.3cm verrucated flesh colored lesion on right cheek) and rash (greasy scaled, yellow rash behind ears bilaterally consistent with seborrhea) present.   Neurological:      Mental Status: She is alert.   Psychiatric:         Mood and Affect: Mood normal.         Behavior: Behavior normal.         Thought Content: Thought content normal.         Judgment: Judgment normal.       Assessment:       1. Skin lesion    2. Seborrheic dermatitis        Plan:       1. Skin lesion  -     Ambulatory referral/consult to Dermatology; Future; Expected date: 08/22/2024    2. Seborrheic dermatitis  -     Ambulatory referral/consult to Dermatology; Future; Expected date: 08/22/2024     Advised OTC treatment for seborrhea.   Will refer to dermatology for both new skin lesion and rash

## 2024-08-28 ENCOUNTER — HOSPITAL ENCOUNTER (OUTPATIENT)
Dept: RADIOLOGY | Facility: HOSPITAL | Age: 84
Discharge: HOME | End: 2024-08-28
Attending: FAMILY MEDICINE
Payer: MEDICARE

## 2024-08-28 DIAGNOSIS — Z12.31 SCREENING MAMMOGRAM FOR HIGH-RISK PATIENT: ICD-10-CM

## 2024-08-28 PROCEDURE — 77063 BREAST TOMOSYNTHESIS BI: CPT | Mod: TC

## 2024-08-28 PROCEDURE — 77067 SCR MAMMO BI INCL CAD: CPT | Mod: TC

## 2024-08-28 PROCEDURE — 77067 SCR MAMMO BI INCL CAD: CPT | Mod: 26,,, | Performed by: RADIOLOGY

## 2024-08-28 PROCEDURE — 77063 BREAST TOMOSYNTHESIS BI: CPT | Mod: 26,,, | Performed by: RADIOLOGY

## 2024-08-30 ENCOUNTER — TELEPHONE (OUTPATIENT)
Dept: DERMATOLOGY | Facility: CLINIC | Age: 84
End: 2024-08-30
Payer: MEDICARE

## 2024-08-30 NOTE — TELEPHONE ENCOUNTER
Writer spoke with pt's daughter and notified her that the referral was for Dr Wharton's clinic.  Phone number provided.

## 2024-08-30 NOTE — TELEPHONE ENCOUNTER
----- Message from Grey Willingham sent at 8/30/2024 10:02 AM CDT -----  Contact: DAughter  Type:  Sooner Appointment Request    Caller is requesting a sooner appointment.  Caller declined first available appointment listed below.  Caller will not accept being placed on the waitlist and is requesting a message be sent to doctor.    Name of Caller:  Liz/Daughter  When is the first available appointment?  N/a  Symptoms:  Skin lesion  Would the patient rather a call back or a response via [facility]? Call back   Best Call Back Number:  [phone]      Additional Information:  States pt would like to flora an appt and has a referral.Please call back

## 2024-10-16 ENCOUNTER — HOSPITAL ENCOUNTER (OUTPATIENT)
Dept: RADIOLOGY | Facility: HOSPITAL | Age: 84
Discharge: HOME | End: 2024-10-16
Attending: STUDENT IN AN ORGANIZED HEALTH CARE EDUCATION/TRAINING PROGRAM
Payer: MEDICARE

## 2024-10-16 ENCOUNTER — OFFICE VISIT (OUTPATIENT)
Dept: FAMILY MEDICINE | Facility: CLINIC | Age: 84
End: 2024-10-16
Payer: MEDICARE

## 2024-10-16 VITALS
HEIGHT: 67 IN | BODY MASS INDEX: 24.09 KG/M2 | DIASTOLIC BLOOD PRESSURE: 74 MMHG | OXYGEN SATURATION: 95 % | WEIGHT: 153.5 LBS | RESPIRATION RATE: 16 BRPM | SYSTOLIC BLOOD PRESSURE: 124 MMHG | HEART RATE: 66 BPM

## 2024-10-16 DIAGNOSIS — M25.511 ACUTE PAIN OF RIGHT SHOULDER: ICD-10-CM

## 2024-10-16 DIAGNOSIS — M25.511 ACUTE PAIN OF RIGHT SHOULDER: Primary | ICD-10-CM

## 2024-10-16 PROCEDURE — 3074F SYST BP LT 130 MM HG: CPT | Mod: CPTII,S$GLB,, | Performed by: STUDENT IN AN ORGANIZED HEALTH CARE EDUCATION/TRAINING PROGRAM

## 2024-10-16 PROCEDURE — 1159F MED LIST DOCD IN RCRD: CPT | Mod: CPTII,S$GLB,, | Performed by: STUDENT IN AN ORGANIZED HEALTH CARE EDUCATION/TRAINING PROGRAM

## 2024-10-16 PROCEDURE — 3288F FALL RISK ASSESSMENT DOCD: CPT | Mod: CPTII,S$GLB,, | Performed by: STUDENT IN AN ORGANIZED HEALTH CARE EDUCATION/TRAINING PROGRAM

## 2024-10-16 PROCEDURE — 73030 X-RAY EXAM OF SHOULDER: CPT | Mod: 26,RT,S$GLB, | Performed by: RADIOLOGY

## 2024-10-16 PROCEDURE — 3078F DIAST BP <80 MM HG: CPT | Mod: CPTII,S$GLB,, | Performed by: STUDENT IN AN ORGANIZED HEALTH CARE EDUCATION/TRAINING PROGRAM

## 2024-10-16 PROCEDURE — 99213 OFFICE O/P EST LOW 20 MIN: CPT | Mod: S$GLB,,, | Performed by: STUDENT IN AN ORGANIZED HEALTH CARE EDUCATION/TRAINING PROGRAM

## 2024-10-16 PROCEDURE — 73030 X-RAY EXAM OF SHOULDER: CPT | Mod: TC,RT

## 2024-10-16 PROCEDURE — 1101F PT FALLS ASSESS-DOCD LE1/YR: CPT | Mod: CPTII,S$GLB,, | Performed by: STUDENT IN AN ORGANIZED HEALTH CARE EDUCATION/TRAINING PROGRAM

## 2024-10-16 PROCEDURE — 1125F AMNT PAIN NOTED PAIN PRSNT: CPT | Mod: CPTII,S$GLB,, | Performed by: STUDENT IN AN ORGANIZED HEALTH CARE EDUCATION/TRAINING PROGRAM

## 2024-10-16 PROCEDURE — 99999 PR PBB SHADOW E&M-EST. PATIENT-LVL IV: CPT | Mod: PBBFAC,,, | Performed by: STUDENT IN AN ORGANIZED HEALTH CARE EDUCATION/TRAINING PROGRAM

## 2024-10-16 RX ORDER — FUROSEMIDE 20 MG/1
20 TABLET ORAL DAILY
COMMUNITY

## 2024-10-16 RX ORDER — TRAMADOL HYDROCHLORIDE 50 MG/1
50 TABLET ORAL DAILY PRN
Qty: 30 TABLET | Refills: 0 | Status: SHIPPED | OUTPATIENT
Start: 2024-10-16

## 2024-10-16 NOTE — PROGRESS NOTES
Subjective:       Patient ID: Theresa Hook is a 84 y.o. female.    Chief Complaint: Shoulder Pain    Right shoulder pain  Two weeks  Pain with abduction.  Limited range of motion  No falls.  Pain  through the whole shoulder  Using otc medication. Tried nsaids. Tried tylenol.  Tried voltaren gel    She is with her caregiver          .  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    [place] (hard of hearing)    Atrophic vaginitis    Mixed stress and urge urinary incontinence    S/P laparoscopic assisted vaginal hysterectomy (LAVH),     Nocturia    Personal history of breast cancer, 2005    Pelvic floor weakness    Imbalance due to old stroke    Need for Tdap vaccination    Need for vaccination with 13-polyvalent pneumococcal conjugate vaccine    Paroxysmal atrial fibrillation    Old embolic stroke without late effect, assoc a fib     S/P coronary artery stent placement,     Amnestic MCI (mild cognitive impairment with memory loss)    H/O: stroke,     Gait instability    Family history of Charcot foot disease    Encounter for screening mammogram for malignant neoplasm of breast    Abdominal bloating    Frequent UTI    Irregular bowel habits    Anticoagulant long-term use, apixiban    Coronary artery disease of native artery of native heart with stable angina pectoris    S/P angioplasty with stent,     Keratoconus of right eye    ABMD (anterior basement membrane dystrophy)    Pseudophakia of both eyes    Osteopenia of both hips    At risk of fracture due to osteoporosis    Gastroesophageal reflux disease    Diverticula of colon    Moderate dementia, unspecified dementia type, unspecified whether behavioral, psychotic, or mood disturbance or anxiety    Acute bronchitis    Ingrown nail    Peripheral vascular disease     Theresa has a current medication list which includes the following  prescription(s): amiodarone, apixaban, b complex vitamins, esomeprazole, furosemide, gabapentin, memantine, nitrofurantoin (macrocrystal-monohydrate), pravastatin, and tramadol.    Review of Systems   Constitutional:  Negative for activity change and appetite change.   Respiratory:  Negative for shortness of breath.    Cardiovascular:  Negative for chest pain.   Gastrointestinal:  Negative for abdominal pain.   Genitourinary:  Negative for dysuria.   Musculoskeletal:  Positive for arthralgias.   Integumentary:  Negative for rash.   Psychiatric/Behavioral:  Negative for sleep disturbance.          Health Maintenance Due   Topic Date Due    TETANUS VACCINE  Never done    RSV Vaccine (Age 60+ and Pregnant patients) (1 - 1-dose 75+ series) Never done    Shingles Vaccine (1 of 2) 01/20/2015    COVID-19 Vaccine (3 - Mixed Product risk series) 04/12/2021    Lipid Panel  05/22/2024    Influenza Vaccine (1) Never done        Objective:      Physical Exam  Constitutional:       General: She is not in acute distress.     Appearance: Normal appearance. She is not ill-appearing.   Eyes:      Conjunctiva/sclera: Conjunctivae normal.   Cardiovascular:      Rate and Rhythm: Normal rate and regular rhythm.      Heart sounds: Normal heart sounds. No murmur heard.  Pulmonary:      Effort: Pulmonary effort is normal. No respiratory distress.      Breath sounds: Normal breath sounds. No wheezing or rales.   Musculoskeletal:      Right shoulder: No swelling, deformity, tenderness or crepitus. Decreased range of motion.      Comments: Limited in abduction   Cannot reach her back.     Skin:     General: Skin is warm and dry.   Neurological:      Mental Status: She is alert. Mental status is at baseline.      Gait: Gait normal.   Psychiatric:         Mood and Affect: Mood normal.         Behavior: Behavior normal.         Thought Content: Thought content normal.         Judgment: Judgment normal.         Assessment:       1. Acute pain of  right shoulder        Plan:       1. Acute pain of right shoulder  Comments:  xray, pt, consider ortho. start temp script of ultram for bad days. continue her prn therapy.  Orders:  -     X-ray Shoulder 2 or More Views Right; Future; Expected date: 10/16/2024  -     Ambulatory referral/consult to Physical/Occupational Therapy; Future; Expected date: 10/23/2024  -     traMADoL (ULTRAM) 50 mg tablet; Take 1 tablet (50 mg total) by mouth daily as needed for Pain.  Dispense: 30 tablet; Refill: 0

## 2024-10-18 ENCOUNTER — TELEPHONE (OUTPATIENT)
Dept: FAMILY MEDICINE | Facility: CLINIC | Age: 84
End: 2024-10-18
Payer: MEDICARE

## 2024-10-18 NOTE — TELEPHONE ENCOUNTER
----- Message from Mireya sent at 10/18/2024  2:06 PM CDT -----  Contact: Steven at [facility]  Type:  Patient Returning Call    Who Called:Steven from [facility]  Who Left Message for Patient:gertrudis  Does the patient know what this is regarding?:yes  Would the patient rather a call back or a response via [facility]? call  Best Call Back Number:   Additional Information: please advise and thank you.

## 2024-10-18 NOTE — TELEPHONE ENCOUNTER
----- Message from Gary sent at 10/18/2024  1:38 PM CDT -----  Regarding: advice  Contact: becky: Steven  Type:  Patient Returning Call    Who Called:nephew: Steven  Who Left Message for Patient:nurse  Does the patient know what this is regarding?:results/ patient should is still hurting bad  Would the patient rather a call back or a response via [facility]? Please advise  Best Call Back Number:[phone]  Additional Information:

## 2024-10-23 ENCOUNTER — TELEPHONE (OUTPATIENT)
Dept: ORTHOPEDICS | Facility: CLINIC | Age: 84
End: 2024-10-23
Payer: MEDICARE

## 2024-10-23 NOTE — TELEPHONE ENCOUNTER
----- Message from Gama Celeste sent at 10/23/2024  3:46 PM CDT -----  Contact: scott pena  Calling to schedule referral,   Call back  [phone]

## 2024-10-24 ENCOUNTER — OFFICE VISIT (OUTPATIENT)
Dept: ORTHOPEDICS | Facility: CLINIC | Age: 84
End: 2024-10-24
Payer: MEDICARE

## 2024-10-24 VITALS — WEIGHT: 153 LBS | HEIGHT: 67 IN | BODY MASS INDEX: 24.01 KG/M2

## 2024-10-24 DIAGNOSIS — M25.511 ACUTE PAIN OF RIGHT SHOULDER: ICD-10-CM

## 2024-10-24 PROCEDURE — 99999 PR PBB SHADOW E&M-EST. PATIENT-LVL III: CPT | Mod: PBBFAC,,, | Performed by: ORTHOPAEDIC SURGERY

## 2024-10-24 RX ORDER — TRIAMCINOLONE ACETONIDE 40 MG/ML
40 INJECTION, SUSPENSION INTRA-ARTICULAR; INTRAMUSCULAR
Status: DISCONTINUED | OUTPATIENT
Start: 2024-10-24 | End: 2024-10-24 | Stop reason: HOSPADM

## 2024-10-24 RX ADMIN — TRIAMCINOLONE ACETONIDE 40 MG: 40 INJECTION, SUSPENSION INTRA-ARTICULAR; INTRAMUSCULAR at 01:10

## 2024-10-24 NOTE — PROCEDURES
Large Joint Aspiration/Injection: R subacromial bursa    Date/Time: 10/24/2024 1:15 PM    Performed by: Guille Franco MD  Authorized by: Guille Franco MD    Consent Done?:  Yes (Verbal)  Indications:  Pain  Site marked: the procedure site was marked    Timeout: prior to procedure the correct patient, procedure, and site was verified    Local anesthetic: Ropivicaine.  Anesthetic total (ml):  3      Details:  Needle Size:  20 G  Ultrasonic Guidance for needle placement?: No    Approach:  Posterior  Location:  Shoulder  Site:  R subacromial bursa  Medications:  40 mg triamcinolone acetonide 40 mg/mL  Patient tolerance:  Patient tolerated the procedure well with no immediate complications

## 2024-10-24 NOTE — PROGRESS NOTES
Subjective:      Patient ID: Theresa Hook is a 84 y.o. female.    Chief Complaint: Pain of the Right Shoulder (Increasing over the past few weeks)    HPI  Patient presents with a several weeks of somewhat progressive right shoulder pain.  Denies any trauma.  Having pain with the dressing in doing overhead activities or any lifting  ROS      Objective:    Ortho Exam     Constitutional:   Patient is alert  and oriented in no acute distress  HEENT:  normocephalic atraumatic; PERRL EOMI  Neck:  Supple without adenopathy  Cardiovascular:  Normal rate and rhythm  Pulmonary:  Normal respiratory effort normal chest wall expansion  Abdominal:  Nonprotuberant nondistended  Musculoskeletal:  Patient has a steady nonantalgic gait.  Neck is supple with adequate range of motion  No Lhermitte's or Spurling sign.  Mild limitation of range of motion of the right shoulder with discomfort with full forward flexion and internal rotation.  There is a mildly positive impingement sign.  Negative Yergason's and Speed's testing.  No gross weakness however pain with strength testing against resistance there is no swelling mass or atrophy noted.  There is a normal distal neurologic and vascular examination.  Neurological:  No focal defect; cranial nerves 2-12 grossly intact  Psychiatric/behavioral:  Mood and behavior normal    X-ray Shoulder 2 or More Views Right  Narrative: EXAMINATION:  XR SHOULDER COMPLETE 2 OR MORE VIEWS RIGHT    CLINICAL HISTORY:  Pain in right shoulder    TECHNIQUE:  Two or three views of the right shoulder were performed.    COMPARISON:  None    FINDINGS:  No acute fracture or dislocation.  No significant soft tissue swelling.    Humeral head normally positioned with the glenoid cavity.  Mild glenohumeral degenerative osteoarthrosis with mild joint space narrowing and small osteophyte formation.  AC joint intact with mild degenerative osteoarthrosis.  Mild narrowing of the acromiohumeral joint space by a  downsloping acromion can be seen with chronic rotator cuff disease.  Impression: Mild chronic changes of degenerative osteoarthrosis.    Electronically signed by: Isaac Posada  Date:    10/16/2024  Time:    15:56       My Radiographs Findings:    I have personally reviewed radiographs and concur with above findings    Assessment:       Encounter Diagnosis   Name Primary?    Acute pain of right shoulder          Plan:       I have discussed medical condition treatment options with her at length.  After a verbal consent and sterile prep I injected her right shoulder today.  I have discussed home health/PT for her.  Generalized activity restrictions and follow up with me in approximately 6 weeks I will see her sooner if any questions or problems        Past Medical History:   Diagnosis Date    A-fib     Amblyopia     right eye    Breast cancer 2005    left    Diarrhea 10/30/2012    Dry eye syndrome     HTN (hypertension) 8/14/2012    Interval gout 8/14/2012    Keratoconus of right eye     Stroke 06/2016    Dr. Rocha     Past Surgical History:   Procedure Laterality Date    APPENDECTOMY      BARTHOLIN GLAND CYST EXCISION      BREAST LUMPECTOMY Left 2005    CARDIAC CATHETERIZATION      CATARACT EXTRACTION  03/30/2009    right eye    CATARACT EXTRACTION  04/13/2009    left eye    COLONOSCOPY      ESOPHAGOGASTRODUODENOSCOPY      EYE SURGERY      HYSTERECTOMY  10/22/2015    spine cyst surgery      UPPER GASTROINTESTINAL ENDOSCOPY           Current Outpatient Medications:     amiodarone (PACERONE) 200 MG Tab, Take by mouth once daily., Disp: , Rfl:     apixaban (ELIQUIS) 2.5 mg Tab, Take 2.5 mg by mouth 2 (two) times daily., Disp: , Rfl:     b complex vitamins tablet, Take 1 tablet by mouth once daily., Disp: , Rfl:     esomeprazole (NEXIUM) 20 MG capsule, Take 20 mg by mouth once daily., Disp: , Rfl:     furosemide (LASIX) 20 MG tablet, Take 20 mg by mouth once daily., Disp: , Rfl:     gabapentin (NEURONTIN) 300 MG  "capsule, , Disp: , Rfl:     memantine (NAMENDA) 5 MG Tab, Take 5 mg by mouth., Disp: , Rfl:     pravastatin (PRAVACHOL) 40 MG tablet, Take 1 tablet (40 mg total) by mouth once daily., Disp: 28 tablet, Rfl: 11    traMADoL (ULTRAM) 50 mg tablet, Take 1 tablet (50 mg total) by mouth daily as needed for Pain., Disp: 30 tablet, Rfl: 0    walker (ULTRA-LIGHT ROLLATOR MISC), Rollator, See Instructions, use for ambulation-69., # 1 EA, 0 Refill(s), Disp: , Rfl:     Review of patient's allergies indicates:   Allergen Reactions    Sulfa (sulfonamide antibiotics)      Other reaction(s): Rash  Other reaction(s): Dizziness    Collagen      Other reaction(s): burning  Other reaction(s): Swelling  Other reaction(s): Rash  Other reaction(s): Hallucinations    Erythromycin      Other reaction(s): Rash    Quinolones Swelling     Other reaction(s): concern b/o chronic tendonitis    Sulfacetamide sodium Other (See Comments)     "spots in front of my eyes and dizzy"    Sulfasalazine Other (See Comments)     "spots in front of my eyes and dizzy"    Tetracycline      Other reaction(s): Rash       Family History   Problem Relation Name Age of Onset    Diabetes Mother Michelle     Diabetes Sister Judith Hook     Heart disease Sister Judith Hook     Cancer Brother      Cancer Paternal Grandmother      Cancer Cousin      Cancer Maternal Grandmother Carmen Hook     Breast cancer Neg Hx      Cervical cancer Neg Hx      Vaginal cancer Neg Hx      Endometrial cancer Neg Hx      Ovarian cancer Neg Hx       Social History     Occupational History    Not on file   Tobacco Use    Smoking status: Never     Passive exposure: Current    Smokeless tobacco: Never   Substance and Sexual Activity    Alcohol use: Yes     Alcohol/week: 4.0 standard drinks of alcohol     Types: 1 Glasses of wine, 3 Shots of liquor per week     Comment: social    Drug use: No    Sexual activity: Not Currently     Birth control/protection: Post-menopausal       "

## 2024-10-25 ENCOUNTER — TELEPHONE (OUTPATIENT)
Dept: FAMILY MEDICINE | Facility: CLINIC | Age: 84
End: 2024-10-25
Payer: MEDICARE

## 2024-10-25 NOTE — TELEPHONE ENCOUNTER
"----- Message from Rox sent at 10/25/2024  2:03 PM CDT -----  Regarding: NP PHYSICAL THERAPY JHONATAN Ledbetter Morning/Afternoon,    The physical therapy department received your order to get the attached patient scheduled for an evaluation. We have reached out to the patient to schedule them for an evaluation; however, we have been unsuccessful in reaching the patient.      We wanted you to be aware that your patient has not started therapy. If you speak with them again about starting therapy please have them reach out to us at [phone] to get scheduled?.      Sincerely,  Rox "Ms. Deleon" Moiz  Access Navigator  [phone] FAX: [phone]  [email or web address]  "

## 2024-10-30 ENCOUNTER — OFFICE VISIT (OUTPATIENT)
Dept: FAMILY MEDICINE | Facility: CLINIC | Age: 84
End: 2024-10-30
Payer: MEDICARE

## 2024-10-30 VITALS
DIASTOLIC BLOOD PRESSURE: 76 MMHG | HEIGHT: 67 IN | SYSTOLIC BLOOD PRESSURE: 138 MMHG | OXYGEN SATURATION: 100 % | HEART RATE: 74 BPM | RESPIRATION RATE: 18 BRPM | BODY MASS INDEX: 23.72 KG/M2 | WEIGHT: 151.13 LBS

## 2024-10-30 DIAGNOSIS — M25.511 ACUTE PAIN OF RIGHT SHOULDER: ICD-10-CM

## 2024-10-30 PROCEDURE — 1125F AMNT PAIN NOTED PAIN PRSNT: CPT | Mod: CPTII,S$GLB,, | Performed by: FAMILY MEDICINE

## 2024-10-30 PROCEDURE — 99999 PR PBB SHADOW E&M-EST. PATIENT-LVL III: CPT | Mod: PBBFAC,,, | Performed by: FAMILY MEDICINE

## 2024-10-30 PROCEDURE — 3075F SYST BP GE 130 - 139MM HG: CPT | Mod: CPTII,S$GLB,, | Performed by: FAMILY MEDICINE

## 2024-10-30 PROCEDURE — 3288F FALL RISK ASSESSMENT DOCD: CPT | Mod: CPTII,S$GLB,, | Performed by: FAMILY MEDICINE

## 2024-10-30 PROCEDURE — 99213 OFFICE O/P EST LOW 20 MIN: CPT | Mod: S$GLB,,, | Performed by: FAMILY MEDICINE

## 2024-10-30 PROCEDURE — 1101F PT FALLS ASSESS-DOCD LE1/YR: CPT | Mod: CPTII,S$GLB,, | Performed by: FAMILY MEDICINE

## 2024-10-30 PROCEDURE — 3078F DIAST BP <80 MM HG: CPT | Mod: CPTII,S$GLB,, | Performed by: FAMILY MEDICINE

## 2024-10-30 RX ORDER — TRAMADOL HYDROCHLORIDE 50 MG/1
50 TABLET ORAL EVERY 12 HOURS PRN
Start: 2024-10-30 | End: 2024-10-30 | Stop reason: SDUPTHER

## 2024-10-30 RX ORDER — TRAMADOL HYDROCHLORIDE 50 MG/1
50 TABLET ORAL EVERY 12 HOURS PRN
Qty: 60 TABLET | Refills: 0 | Status: ON HOLD | OUTPATIENT
Start: 2024-11-01 | End: 2024-11-08 | Stop reason: HOSPADM

## 2024-10-30 NOTE — PROGRESS NOTES
Subjective:       Patient ID: Theresa Hook is a 84 y.o. female.    Chief Complaint: Follow-up    Ms. Hook presents today for follow up.   She reports no relief with the steroid shot.   She felt bad and flustered. Gave her diarrhea.   She has not started PT yet but has upcoming appointment.  Her shoulder is not hurting all the time, but when she has to move it - it is painful.   She tells me that she is sleeping okay with this.          Follow-up        .  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    [place] (hard of hearing)    Atrophic vaginitis    Mixed stress and urge urinary incontinence    S/P laparoscopic assisted vaginal hysterectomy (LAVH),     Nocturia    Personal history of breast cancer, 2005    Pelvic floor weakness    Imbalance due to old stroke    Need for Tdap vaccination    Need for vaccination with 13-polyvalent pneumococcal conjugate vaccine    Paroxysmal atrial fibrillation    Old embolic stroke without late effect, assoc a fib     S/P coronary artery stent placement,     Amnestic MCI (mild cognitive impairment with memory loss)    H/O: stroke,     Gait instability    Family history of Charcot foot disease    Encounter for screening mammogram for malignant neoplasm of breast    Abdominal bloating    Frequent UTI    Irregular bowel habits    Anticoagulant long-term use, apixiban    Coronary artery disease of native artery of native heart with stable angina pectoris    S/P angioplasty with stent,     Keratoconus of right eye    ABMD (anterior basement membrane dystrophy)    Pseudophakia of both eyes    Osteopenia of both hips    At risk of fracture due to osteoporosis    Gastroesophageal reflux disease    Diverticula of colon    Moderate dementia, unspecified dementia type, unspecified whether behavioral, psychotic, or mood disturbance or anxiety    Acute bronchitis     Ingrown nail    Peripheral vascular disease    Stroke-like symptoms     Theresa has a current medication list which includes the following prescription(s): amiodarone, apixaban, b complex vitamins, esomeprazole, furosemide, gabapentin, memantine, pravastatin, walker, and tramadol, and the following Facility-Administered Medications: acetaminophen, amiodarone, apixaban, atorvastatin, bisacodyl, labetalol, memantine, ondansetron, pantoprazole, sodium chloride 0.9%, and sodium chloride 0.9%.    Review of Systems      Health Maintenance Due   Topic Date Due    TETANUS VACCINE  Never done    RSV Vaccine (Age 60+ and Pregnant patients) (1 - 1-dose 75+ series) Never done    Shingles Vaccine (1 of 2) 01/20/2015    COVID-19 Vaccine (3 - Mixed Product risk series) 04/12/2021      Health Maintenance reviewed and discussed- nothing additional desired today.   Objective:      Physical Exam  Vitals and nursing note reviewed.   Constitutional:       General: She is not in acute distress.     Appearance: She is not ill-appearing.   Cardiovascular:      Rate and Rhythm: Normal rate and regular rhythm.      Heart sounds: No murmur heard.  Pulmonary:      Effort: Pulmonary effort is normal.      Breath sounds: Normal breath sounds. No wheezing.   Skin:     General: Skin is warm and dry.      Findings: No rash.   Neurological:      Mental Status: She is alert.   Psychiatric:         Mood and Affect: Mood normal.         Behavior: Behavior normal.         Assessment:       1. Acute pain of right shoulder        Plan:       1. Acute pain of right shoulder  Comments:  xray, pt, consider ortho. start temp script of ultram for bad days. continue her prn therapy.  Orders:  -     Discontinue: traMADoL (ULTRAM) 50 mg tablet; Take 1 tablet (50 mg total) by mouth every 12 (twelve) hours as needed for Pain.  -     traMADoL (ULTRAM) 50 mg tablet; Take 1 tablet (50 mg total) by mouth every 12 (twelve) hours as needed for Pain.  Dispense: 60  tablet; Refill: 0

## 2024-11-04 ENCOUNTER — HOSPITAL ENCOUNTER (OUTPATIENT)
Facility: HOSPITAL | Age: 84
Discharge: HOME | End: 2024-11-08
Attending: EMERGENCY MEDICINE | Admitting: STUDENT IN AN ORGANIZED HEALTH CARE EDUCATION/TRAINING PROGRAM
Payer: MEDICARE

## 2024-11-04 DIAGNOSIS — G93.40 ACUTE ENCEPHALOPATHY: Primary | ICD-10-CM

## 2024-11-04 DIAGNOSIS — R53.81 PHYSICAL DECONDITIONING: ICD-10-CM

## 2024-11-04 DIAGNOSIS — M25.561 RIGHT KNEE PAIN: ICD-10-CM

## 2024-11-04 DIAGNOSIS — R29.90 STROKE-LIKE SYMPTOM: ICD-10-CM

## 2024-11-04 LAB
ALBUMIN SERPL BCP-MCNC: 3.5 G/DL (ref 3.5–5.2)
ALP SERPL-CCNC: 69 U/L (ref 40–150)
ALT SERPL W/O P-5'-P-CCNC: 14 U/L (ref 10–44)
ANION GAP SERPL CALC-SCNC: 12 MMOL/L (ref 8–16)
AST SERPL-CCNC: 26 U/L (ref 10–40)
BASOPHILS # BLD AUTO: 0.1 K/UL (ref 0–0.2)
BASOPHILS NFR BLD: 1.1 % (ref 0–1.9)
BILIRUB SERPL-MCNC: 0.7 MG/DL (ref 0.1–1)
BILIRUB UR QL STRIP: NEGATIVE
BUN SERPL-MCNC: 16 MG/DL (ref 8–23)
CALCIUM SERPL-MCNC: 9.5 MG/DL (ref 8.7–10.5)
CHLORIDE SERPL-SCNC: 104 MMOL/L (ref 95–110)
CLARITY UR: CLEAR
CO2 SERPL-SCNC: 25 MMOL/L (ref 23–29)
COLOR UR: YELLOW
CREAT SERPL-MCNC: 0.8 MG/DL (ref 0.5–1.4)
DIFFERENTIAL METHOD BLD: ABNORMAL
EOSINOPHIL # BLD AUTO: 0.2 K/UL (ref 0–0.5)
EOSINOPHIL NFR BLD: 1.7 % (ref 0–8)
ERYTHROCYTE [DISTWIDTH] IN BLOOD BY AUTOMATED COUNT: 14.1 % (ref 11.5–14.5)
EST. GFR  (NO RACE VARIABLE): >60 ML/MIN/1.73 M^2
GLUCOSE SERPL-MCNC: 91 MG/DL (ref 70–110)
GLUCOSE UR QL STRIP: NEGATIVE
HCT VFR BLD AUTO: 41.4 % (ref 37–48.5)
HCV AB SERPL QL IA: NORMAL
HGB BLD-MCNC: 13.2 G/DL (ref 12–16)
HGB UR QL STRIP: NEGATIVE
HIV 1+2 AB+HIV1 P24 AG SERPL QL IA: NORMAL
IMM GRANULOCYTES # BLD AUTO: 0.04 K/UL (ref 0–0.04)
IMM GRANULOCYTES NFR BLD AUTO: 0.4 % (ref 0–0.5)
KETONES UR QL STRIP: NEGATIVE
LEUKOCYTE ESTERASE UR QL STRIP: NEGATIVE
LYMPHOCYTES # BLD AUTO: 1.9 K/UL (ref 1–4.8)
LYMPHOCYTES NFR BLD: 21.1 % (ref 18–48)
MCH RBC QN AUTO: 29.9 PG (ref 27–31)
MCHC RBC AUTO-ENTMCNC: 31.9 G/DL (ref 32–36)
MCV RBC AUTO: 94 FL (ref 82–98)
MONOCYTES # BLD AUTO: 1.1 K/UL (ref 0.3–1)
MONOCYTES NFR BLD: 11.6 % (ref 4–15)
NEUTROPHILS # BLD AUTO: 5.9 K/UL (ref 1.8–7.7)
NEUTROPHILS NFR BLD: 64.1 % (ref 38–73)
NITRITE UR QL STRIP: NEGATIVE
NRBC BLD-RTO: 0 /100 WBC
PH UR STRIP: 6 [PH] (ref 5–8)
PLATELET # BLD AUTO: 251 K/UL (ref 150–450)
PMV BLD AUTO: 11 FL (ref 9.2–12.9)
POTASSIUM SERPL-SCNC: 4.2 MMOL/L (ref 3.5–5.1)
PROT SERPL-MCNC: 7.1 G/DL (ref 6–8.4)
PROT UR QL STRIP: NEGATIVE
RBC # BLD AUTO: 4.41 M/UL (ref 4–5.4)
SODIUM SERPL-SCNC: 141 MMOL/L (ref 136–145)
SP GR UR STRIP: 1.02 (ref 1–1.03)
URN SPEC COLLECT METH UR: NORMAL
UROBILINOGEN UR STRIP-ACNC: 1 EU/DL
WBC # BLD AUTO: 9.2 K/UL (ref 3.9–12.7)

## 2024-11-04 PROCEDURE — 99285 EMERGENCY DEPT VISIT HI MDM: CPT | Mod: 25

## 2024-11-04 PROCEDURE — 73562 X-RAY EXAM OF KNEE 3: CPT | Mod: 26,RT,, | Performed by: RADIOLOGY

## 2024-11-04 PROCEDURE — G0378 HOSPITAL OBSERVATION PER HR: HCPCS

## 2024-11-04 PROCEDURE — 73562 X-RAY EXAM OF KNEE 3: CPT | Mod: TC,RT

## 2024-11-04 PROCEDURE — 99223 1ST HOSP IP/OBS HIGH 75: CPT | Mod: AI,,, | Performed by: STUDENT IN AN ORGANIZED HEALTH CARE EDUCATION/TRAINING PROGRAM

## 2024-11-04 PROCEDURE — 96372 THER/PROPH/DIAG INJ SC/IM: CPT | Performed by: NURSE PRACTITIONER

## 2024-11-04 PROCEDURE — 85025 COMPLETE CBC W/AUTO DIFF WBC: CPT | Performed by: EMERGENCY MEDICINE

## 2024-11-04 PROCEDURE — 81003 URINALYSIS AUTO W/O SCOPE: CPT | Performed by: EMERGENCY MEDICINE

## 2024-11-04 PROCEDURE — 86803 HEPATITIS C AB TEST: CPT | Performed by: EMERGENCY MEDICINE

## 2024-11-04 PROCEDURE — 94761 N-INVAS EAR/PLS OXIMETRY MLT: CPT

## 2024-11-04 PROCEDURE — 80053 COMPREHEN METABOLIC PANEL: CPT | Performed by: EMERGENCY MEDICINE

## 2024-11-04 PROCEDURE — 63600175 PHARM REV CODE 636 W HCPCS: Mod: JZ,JG | Performed by: NURSE PRACTITIONER

## 2024-11-04 PROCEDURE — 70450 CT HEAD/BRAIN W/O DYE: CPT | Mod: 26,,, | Performed by: RADIOLOGY

## 2024-11-04 PROCEDURE — 87389 HIV-1 AG W/HIV-1&-2 AB AG IA: CPT | Performed by: EMERGENCY MEDICINE

## 2024-11-04 PROCEDURE — 70450 CT HEAD/BRAIN W/O DYE: CPT | Mod: TC

## 2024-11-04 RX ORDER — SODIUM CHLORIDE 0.9 % (FLUSH) 0.9 %
10 SYRINGE (ML) INJECTION
Status: DISCONTINUED | OUTPATIENT
Start: 2024-11-04 | End: 2024-11-08 | Stop reason: HOSPADM

## 2024-11-04 RX ORDER — LABETALOL HCL 20 MG/4 ML
10 SYRINGE (ML) INTRAVENOUS
Status: DISCONTINUED | OUTPATIENT
Start: 2024-11-04 | End: 2024-11-08 | Stop reason: HOSPADM

## 2024-11-04 RX ORDER — AMIODARONE HYDROCHLORIDE 100 MG/1
200 TABLET ORAL DAILY
Status: DISCONTINUED | OUTPATIENT
Start: 2024-11-05 | End: 2024-11-08 | Stop reason: HOSPADM

## 2024-11-04 RX ORDER — ONDANSETRON HYDROCHLORIDE 2 MG/ML
4 INJECTION, SOLUTION INTRAVENOUS EVERY 12 HOURS PRN
Status: DISCONTINUED | OUTPATIENT
Start: 2024-11-04 | End: 2024-11-08 | Stop reason: HOSPADM

## 2024-11-04 RX ORDER — ASPIRIN 81 MG/1
81 TABLET ORAL DAILY
Status: DISCONTINUED | OUTPATIENT
Start: 2024-11-05 | End: 2024-11-05

## 2024-11-04 RX ORDER — BISACODYL 10 MG/1
10 SUPPOSITORY RECTAL DAILY PRN
Status: DISCONTINUED | OUTPATIENT
Start: 2024-11-04 | End: 2024-11-08 | Stop reason: HOSPADM

## 2024-11-04 RX ORDER — ATORVASTATIN CALCIUM 40 MG/1
40 TABLET, FILM COATED ORAL DAILY
Status: DISCONTINUED | OUTPATIENT
Start: 2024-11-05 | End: 2024-11-08 | Stop reason: HOSPADM

## 2024-11-04 RX ORDER — OLANZAPINE 10 MG/2ML
5 INJECTION, POWDER, FOR SOLUTION INTRAMUSCULAR ONCE AS NEEDED
Status: COMPLETED | OUTPATIENT
Start: 2024-11-04 | End: 2024-11-04

## 2024-11-04 RX ORDER — OLANZAPINE 10 MG/2ML
2.5 INJECTION, POWDER, FOR SOLUTION INTRAMUSCULAR ONCE AS NEEDED
Status: DISCONTINUED | OUTPATIENT
Start: 2024-11-04 | End: 2024-11-04

## 2024-11-04 RX ORDER — ACETAMINOPHEN 325 MG/1
650 TABLET ORAL EVERY 6 HOURS PRN
Status: DISCONTINUED | OUTPATIENT
Start: 2024-11-04 | End: 2024-11-08 | Stop reason: HOSPADM

## 2024-11-04 RX ORDER — PANTOPRAZOLE SODIUM 40 MG/1
40 TABLET, DELAYED RELEASE ORAL DAILY
Status: DISCONTINUED | OUTPATIENT
Start: 2024-11-05 | End: 2024-11-08 | Stop reason: HOSPADM

## 2024-11-04 RX ORDER — MEMANTINE HYDROCHLORIDE 5 MG/1
5 TABLET ORAL DAILY
Status: DISCONTINUED | OUTPATIENT
Start: 2024-11-05 | End: 2024-11-08 | Stop reason: HOSPADM

## 2024-11-04 RX ORDER — QUETIAPINE FUMARATE 25 MG/1
25 TABLET, FILM COATED ORAL NIGHTLY
Status: DISCONTINUED | OUTPATIENT
Start: 2024-11-04 | End: 2024-11-04

## 2024-11-04 RX ADMIN — OLANZAPINE 5 MG: 10 INJECTION, POWDER, FOR SOLUTION INTRAMUSCULAR at 10:11

## 2024-11-04 NOTE — ED PROVIDER NOTES
"Encounter Date: 11/4/2024       History     Chief Complaint   Patient presents with    Dysuria     Foul smelling urine x 3-4 days    Dizziness     Caregiver reports pt has been having trouble walking and increased confusion over the last 2-3 days. Caregiver concerned pt may have a UTI.     84-year-old female, history of dementia, brought by caregiver for evaluation and treatment of possible urinary tract infection.  Caregiver states urine has been strong for the last few days.  Patient has been more confused than normal as well.  Patient was also complaining of right knee pain over the past few days.  She has a history of chronic right knee pain, but it seems to be worse as of late.  No fevers or chills, no falls, no head trauma etc. caregiver tried to take the patient's urgent care tomorrow, but the patient would not go in.  Patient denies any pain or discomfort at this time.  She does indicate that she was having trouble urinating.        Review of patient's allergies indicates:   Allergen Reactions    Sulfa (sulfonamide antibiotics)      Other reaction(s): Rash  Other reaction(s): Dizziness    Collagen      Other reaction(s): burning  Other reaction(s): Swelling  Other reaction(s): Rash  Other reaction(s): Hallucinations    Erythromycin      Other reaction(s): Rash    Quinolones Swelling     Other reaction(s): concern b/o chronic tendonitis    Sulfacetamide sodium Other (See Comments)     "spots in front of my eyes and dizzy"    Sulfasalazine Other (See Comments)     "spots in front of my eyes and dizzy"    Tetracycline      Other reaction(s): Rash     Past Medical History:   Diagnosis Date    A-fib     Amblyopia     right eye    Breast cancer 2005    left    Dementia     Diarrhea 10/30/2012    Dry eye syndrome     HTN (hypertension) 08/14/2012    Interval gout 08/14/2012    Keratoconus of right eye     Stroke 06/2016    Dr. Rocha     Past Surgical History:   Procedure Laterality Date    APPENDECTOMY      " BARTHOLIN GLAND CYST EXCISION      BREAST LUMPECTOMY Left 2005    CARDIAC CATHETERIZATION      CATARACT EXTRACTION  03/30/2009    right eye    CATARACT EXTRACTION  04/13/2009    left eye    COLONOSCOPY      ESOPHAGOGASTRODUODENOSCOPY      EYE SURGERY      HYSTERECTOMY  10/22/2015    spine cyst surgery      UPPER GASTROINTESTINAL ENDOSCOPY       Family History   Problem Relation Name Age of Onset    Diabetes Mother Michelle     Diabetes Sister Judith Hook     Heart disease Sister Judith Hook     Cancer Brother      Cancer Paternal Grandmother      Cancer Cousin      Cancer Maternal Grandmother Carmen Hook     Breast cancer Neg Hx      Cervical cancer Neg Hx      Vaginal cancer Neg Hx      Endometrial cancer Neg Hx      Ovarian cancer Neg Hx       Social History     Tobacco Use    Smoking status: Never     Passive exposure: Current    Smokeless tobacco: Never   Substance Use Topics    Alcohol use: Yes     Alcohol/week: 4.0 standard drinks of alcohol     Types: 1 Glasses of wine, 3 Shots of liquor per week     Comment: social    Drug use: No     Review of Systems   Genitourinary:  Positive for dysuria.   Musculoskeletal:  Positive for arthralgias (Right knee pain).   Psychiatric/Behavioral:  Positive for confusion and decreased concentration.    All other systems reviewed and are negative.      Physical Exam     Initial Vitals   BP Pulse Resp Temp SpO2   11/04/24 1516 11/04/24 1514 11/04/24 1514 11/04/24 1514 11/04/24 1514   (!) 162/80 65 20 97.9 °F (36.6 °C) 95 %      MAP       --                Physical Exam    Vitals reviewed.  Constitutional: She appears well-developed and well-nourished. She is not diaphoretic. No distress.   HENT:   Head: Normocephalic and atraumatic.   Nose: Nose normal. Mouth/Throat: Oropharynx is clear and moist.   Eyes: Conjunctivae and EOM are normal. Pupils are equal, round, and reactive to light. No scleral icterus.   Neck: Neck supple. No JVD present.   Normal range of  motion.  Cardiovascular:  Normal rate, regular rhythm, normal heart sounds and intact distal pulses.           No murmur heard.  Pulmonary/Chest: Breath sounds normal. No stridor. No respiratory distress. She has no wheezes. She has no rhonchi. She has no rales.   Abdominal: Abdomen is soft. Bowel sounds are normal. She exhibits no distension. There is no abdominal tenderness.   Musculoskeletal:         General: Tenderness present. No edema. Normal range of motion.      Cervical back: Normal range of motion and neck supple.      Comments: Right knee is normal to visual inspection.  No appreciable edema, no obvious bruising.  No erythema or excess warmth.  No crepitus with passive range of motion, no joint laxity noted.  Distal sensory and motor normal, compartments are soft.  Normal capillary refill.     Neurological: She is alert and oriented to person, place, and time. She has normal strength. No cranial nerve deficit or sensory deficit. GCS score is 15. GCS eye subscore is 4. GCS verbal subscore is 5. GCS motor subscore is 6.   Skin: Skin is warm and dry. Capillary refill takes less than 2 seconds. No rash noted. No erythema.   Psychiatric: She has a normal mood and affect. Her behavior is normal.         ED Course   Procedures  Labs Reviewed   CBC W/ AUTO DIFFERENTIAL - Abnormal       Result Value    WBC 9.20      RBC 4.41      Hemoglobin 13.2      Hematocrit 41.4      MCV 94      MCH 29.9      MCHC 31.9 (*)     RDW 14.1      Platelets 251      MPV 11.0      Immature Granulocytes 0.4      Gran # (ANC) 5.9      Immature Grans (Abs) 0.04      Lymph # 1.9      Mono # 1.1 (*)     Eos # 0.2      Baso # 0.10      nRBC 0      Gran % 64.1      Lymph % 21.1      Mono % 11.6      Eosinophil % 1.7      Basophil % 1.1      Differential Method Automated     COMPREHENSIVE METABOLIC PANEL    Sodium 141      Potassium 4.2      Chloride 104      CO2 25      Glucose 91      BUN 16      Creatinine 0.8      Calcium 9.5      Total  Protein 7.1      Albumin 3.5      Total Bilirubin 0.7      Alkaline Phosphatase 69      AST 26      ALT 14      eGFR >60.0      Anion Gap 12     URINALYSIS, REFLEX TO URINE CULTURE    Specimen UA Urine, Catheterized      Color, UA Yellow      Appearance, UA Clear      pH, UA 6.0      Specific Gravity, UA 1.020      Protein, UA Negative      Glucose, UA Negative      Ketones, UA Negative      Bilirubin (UA) Negative      Occult Blood UA Negative      Nitrite, UA Negative      Urobilinogen, UA 1.0      Leukocytes, UA Negative      Narrative:     Preferred Collection Type->Urine, Clean Catch  Specimen Source->Urine     EKG Readings: (Independently Interpreted)   EKG personally reviewed by me shows sinus bradycardia left axis, pulmonary disease pattern, possible LVH, 59 beats per minute, TX interval 148, .  No obvious ischemic changes.       Imaging Results              CT Head Without Contrast (Final result)  Result time 11/04/24 18:18:59      Final result by Moris Yang MD (11/04/24 18:18:59)                   Impression:      No CT evidence of acute intracranial abnormality. Clinical correlation and further evaluation as warranted.    Chronic senescent and microvascular ischemic changes.      Electronically signed by: Moris Yang MD  Date:    11/04/2024  Time:    18:18               Narrative:    EXAMINATION:  CT HEAD WITHOUT CONTRAST    CLINICAL HISTORY:  Dizziness, persistent/recurrent, cardiac or vascular cause suspected;Mental status change, unknown cause;    TECHNIQUE:  Low dose axial images were obtained through the head.  Coronal and sagittal reformations were also performed. Contrast was not administered.    COMPARISON:  05/24/2024    FINDINGS:  There is generalized cerebral volume loss with compensatory sulcal widening and ventricular enlargement.  There is patchy periventricular white matter hypoattenuation suggesting sequelae of chronic microvascular ischemic change.  There is  encephalomalacia within the right frontotemporal region suggesting remote right MCA territory infarct.  No CT evidence of acute intracranial hemorrhage.  There is no midline shift or hydrocephalus.  Incidental note is made of empty sella configuration.  The basal cisterns are patent. The mastoid air cells and paranasal sinuses are clear of acute process. No acute displaced calvarial fracture identified.                                       X-Ray Knee 3 View Right (Final result)  Result time 11/04/24 16:32:09      Final result by Isaac Posada MD (11/04/24 16:32:09)                   Narrative:    EXAMINATION:  XR KNEE 3 VIEW RIGHT    CLINICAL HISTORY:  Pain in right knee    TECHNIQUE:  AP and lateral views of the right knee.    COMPARISON:  None    FINDINGS:  Mild tricompartmental degenerative osteoarthrosis with small osteophyte formation.  Joint spaces are preserved.  Normal alignment.  No significant suprapatellar effusion.      Electronically signed by: Isaac Posada  Date:    11/04/2024  Time:    16:32                                     Medications   sodium chloride 0.9% flush 10 mL (has no administration in time range)   sodium chloride 0.9% bolus 500 mL 500 mL (has no administration in time range)   labetalol 20 mg/4 mL (5 mg/mL) IV syring (has no administration in time range)   bisacodyL suppository 10 mg (has no administration in time range)   aspirin EC tablet 81 mg (has no administration in time range)   atorvastatin tablet 40 mg (has no administration in time range)   ondansetron injection 4 mg (has no administration in time range)   acetaminophen tablet 650 mg (has no administration in time range)   memantine tablet 5 mg (has no administration in time range)   pantoprazole EC tablet 40 mg (has no administration in time range)   amiodarone tablet 200 mg (has no administration in time range)   apixaban tablet 2.5 mg (has no administration in time range)   diphenhydrAMINE injection 50 mg (50 mg  Intravenous Given 11/5/24 0159)   OLANZapine injection 5 mg (5 mg Intramuscular Given 11/4/24 1013)   OLANZapine injection 5 mg (5 mg Intramuscular Given 11/5/24 2361)     Medical Decision Making                                __________________________________________________________    Signed out to Raul Manzanares at shift change    Patient with acute encephalopathy worse than her baseline dementia, physical deconditioning.  Discussed plan of care with Dr. Cameron who had intended to admit patient to night shift hospitalist for completion of stroke workup with MRI.      I discussed with hospitalist service will admit to them.    Clinical Impression:  Final diagnoses:  [M25.561] Right knee pain  [G93.40] Acute encephalopathy (Primary)  [R53.81] Physical deconditioning          ED Disposition Condition    Observation Stable                Raul Manzanares,   11/05/24 0549

## 2024-11-05 LAB
ALBUMIN SERPL BCP-MCNC: 3.3 G/DL (ref 3.5–5.2)
ALP SERPL-CCNC: 70 U/L (ref 40–150)
ALT SERPL W/O P-5'-P-CCNC: 21 U/L (ref 10–44)
ANION GAP SERPL CALC-SCNC: 11 MMOL/L (ref 8–16)
AORTIC ROOT ANNULUS: 3.06 CM
AORTIC VALVE CUSP SEPERATION: 1.84 CM
APTT PPP: 37.2 SEC (ref 21–32)
ASCENDING AORTA: 2.48 CM
AST SERPL-CCNC: 27 U/L (ref 10–40)
AV INDEX (PROSTH): 0.65
AV MEAN GRADIENT: 2.1 MMHG
AV PEAK GRADIENT: 4 MMHG
AV REGURGITATION PRESSURE HALF TIME: 689.05 MS
AV VALVE AREA BY VELOCITY RATIO: 1.4 CM²
AV VALVE AREA: 1.8 CM²
AV VELOCITY RATIO: 0.5
BASOPHILS # BLD AUTO: 0.08 K/UL (ref 0–0.2)
BASOPHILS NFR BLD: 1 % (ref 0–1.9)
BILIRUB SERPL-MCNC: 0.7 MG/DL (ref 0.1–1)
BILIRUB UR QL STRIP: NEGATIVE
BSA FOR ECHO PROCEDURE: 1.8 M2
BUN SERPL-MCNC: 11 MG/DL (ref 8–23)
CALCIUM SERPL-MCNC: 9.2 MG/DL (ref 8.7–10.5)
CHLORIDE SERPL-SCNC: 105 MMOL/L (ref 95–110)
CHOLEST SERPL-MCNC: 186 MG/DL (ref 120–199)
CHOLEST/HDLC SERPL: 2.6 {RATIO} (ref 2–5)
CLARITY UR: CLEAR
CO2 SERPL-SCNC: 27 MMOL/L (ref 23–29)
COLOR UR: YELLOW
CREAT SERPL-MCNC: 0.7 MG/DL (ref 0.5–1.4)
CV ECHO LV RWT: 0.53 CM
DIFFERENTIAL METHOD BLD: NORMAL
DOP CALC AO PEAK VEL: 1 M/S
DOP CALC AO VTI: 20.5 CM
DOP CALC LVOT AREA: 2.8 CM2
DOP CALC LVOT DIAMETER: 1.9 CM
DOP CALC LVOT PEAK VEL: 0.5 M/S
DOP CALC LVOT STROKE VOLUME: 37.7 CM3
DOP CALC MV VTI: 30.4 CM
DOP CALCLVOT PEAK VEL VTI: 13.3 CM
E WAVE DECELERATION TIME: 229.65 MSEC
E/A RATIO: 0.91
E/E' RATIO: 11.8 M/S
ECHO LV POSTERIOR WALL: 1 CM (ref 0.6–1.1)
EJECTION FRACTION: 67 %
EOSINOPHIL # BLD AUTO: 0.2 K/UL (ref 0–0.5)
EOSINOPHIL NFR BLD: 2.3 % (ref 0–8)
ERYTHROCYTE [DISTWIDTH] IN BLOOD BY AUTOMATED COUNT: 13.6 % (ref 11.5–14.5)
EST. GFR  (NO RACE VARIABLE): >60 ML/MIN/1.73 M^2
ESTIMATED AVG GLUCOSE: 103 MG/DL (ref 68–131)
FRACTIONAL SHORTENING: 36.8 % (ref 28–44)
GLUCOSE SERPL-MCNC: 86 MG/DL (ref 70–110)
GLUCOSE UR QL STRIP: NEGATIVE
HBA1C MFR BLD: 5.2 % (ref 4–5.6)
HCT VFR BLD AUTO: 41.6 % (ref 37–48.5)
HDLC SERPL-MCNC: 71 MG/DL (ref 40–75)
HDLC SERPL: 38.2 % (ref 20–50)
HGB BLD-MCNC: 13.3 G/DL (ref 12–16)
HGB UR QL STRIP: NEGATIVE
IMM GRANULOCYTES # BLD AUTO: 0.02 K/UL (ref 0–0.04)
IMM GRANULOCYTES NFR BLD AUTO: 0.3 % (ref 0–0.5)
INR PPP: 1.1 (ref 0.8–1.2)
INTERVENTRICULAR SEPTUM: 1 CM (ref 0.6–1.1)
KETONES UR QL STRIP: ABNORMAL
LA MAJOR: 3.69 CM
LA MINOR: 2.53 CM
LDLC SERPL CALC-MCNC: 98 MG/DL (ref 63–159)
LEFT ATRIUM AREA SYSTOLIC (APICAL 2 CHAMBER): 8.98 CM2
LEFT ATRIUM AREA SYSTOLIC (APICAL 4 CHAMBER): 13.17 CM2
LEFT ATRIUM SIZE: 3.47 CM
LEFT ATRIUM VOLUME INDEX MOD: 13.7 ML/M2
LEFT ATRIUM VOLUME MOD: 24.61 ML
LEFT INTERNAL DIMENSION IN SYSTOLE: 2.4 CM (ref 2.1–4)
LEFT VENTRICLE DIASTOLIC VOLUME INDEX: 35.54 ML/M2
LEFT VENTRICLE DIASTOLIC VOLUME: 63.61 ML
LEFT VENTRICLE END SYSTOLIC VOLUME APICAL 2 CHAMBER: 16.51 ML
LEFT VENTRICLE END SYSTOLIC VOLUME APICAL 4 CHAMBER: 35.8 ML
LEFT VENTRICLE MASS INDEX: 65.5 G/M2
LEFT VENTRICLE SYSTOLIC VOLUME INDEX: 11.6 ML/M2
LEFT VENTRICLE SYSTOLIC VOLUME: 20.81 ML
LEFT VENTRICULAR INTERNAL DIMENSION IN DIASTOLE: 3.8 CM (ref 3.5–6)
LEFT VENTRICULAR MASS: 117.3 G
LEUKOCYTE ESTERASE UR QL STRIP: NEGATIVE
LV LATERAL E/E' RATIO: 11.8 M/S
LV SEPTAL E/E' RATIO: 11.8 M/S
LVED V (TEICH): 63.61 ML
LVES V (TEICH): 20.81 ML
LVOT MG: 0.62 MMHG
LVOT MV: 0.37 CM/S
LYMPHOCYTES # BLD AUTO: 1.5 K/UL (ref 1–4.8)
LYMPHOCYTES NFR BLD: 19.2 % (ref 18–48)
MAGNESIUM SERPL-MCNC: 1.8 MG/DL (ref 1.6–2.6)
MCH RBC QN AUTO: 30 PG (ref 27–31)
MCHC RBC AUTO-ENTMCNC: 32 G/DL (ref 32–36)
MCV RBC AUTO: 94 FL (ref 82–98)
MONOCYTES # BLD AUTO: 0.9 K/UL (ref 0.3–1)
MONOCYTES NFR BLD: 11.1 % (ref 4–15)
MV MEAN GRADIENT: 1 MMHG
MV PEAK A VEL: 0.65 M/S
MV PEAK E VEL: 0.59 M/S
MV PEAK GRADIENT: 3 MMHG
MV STENOSIS PRESSURE HALF TIME: 57.67 MS
MV VALVE AREA BY CONTINUITY EQUATION: 1.24 CM2
MV VALVE AREA P 1/2 METHOD: 3.81 CM2
NEUTROPHILS # BLD AUTO: 5.1 K/UL (ref 1.8–7.7)
NEUTROPHILS NFR BLD: 66.1 % (ref 38–73)
NITRITE UR QL STRIP: NEGATIVE
NONHDLC SERPL-MCNC: 115 MG/DL
NRBC BLD-RTO: 0 /100 WBC
OHS CV RV/LV RATIO: 0.63 CM
OHS QRS DURATION: 84 MS
OHS QTC CALCULATION: 447 MS
PH UR STRIP: 7 [PH] (ref 5–8)
PHOSPHATE SERPL-MCNC: 3.2 MG/DL (ref 2.7–4.5)
PISA AR MAX VEL: 2.48 M/S
PISA MRMAX VEL: 3.01 M/S
PISA TR MAX VEL: 2.55 M/S
PLATELET # BLD AUTO: 254 K/UL (ref 150–450)
PMV BLD AUTO: 10.7 FL (ref 9.2–12.9)
POTASSIUM SERPL-SCNC: 3.3 MMOL/L (ref 3.5–5.1)
PROT SERPL-MCNC: 6.7 G/DL (ref 6–8.4)
PROT UR QL STRIP: NEGATIVE
PROTHROMBIN TIME: 11.9 SEC (ref 9–12.5)
PV MV: 0.47 M/S
PV PEAK GRADIENT: 2 MMHG
PV PEAK VELOCITY: 0.76 M/S
RA MAJOR: 3.65 CM
RA PRESSURE ESTIMATED: 3 MMHG
RA WIDTH: 3.24 CM
RBC # BLD AUTO: 4.43 M/UL (ref 4–5.4)
RIGHT VENTRICLE DIASTOLIC BASEL DIMENSION: 2.4 CM
RIGHT VENTRICLE DIASTOLIC LENGTH: 4 CM
RIGHT VENTRICLE DIASTOLIC MID DIMENSION: 1.7 CM
RIGHT VENTRICULAR END-DIASTOLIC DIMENSION: 2.97 CM
RIGHT VENTRICULAR LENGTH IN DIASTOLE (APICAL 4-CHAMBER VIEW): 4.01 CM
RV MID DIAMA: 1.67 CM
RV TB RVSP: 6 MMHG
SINUS: 3.1 CM
SODIUM SERPL-SCNC: 143 MMOL/L (ref 136–145)
SP GR UR STRIP: 1.01 (ref 1–1.03)
STJ: 3.03 CM
TDI LATERAL: 0.05 M/S
TDI SEPTAL: 0.05 M/S
TDI: 0.05 M/S
TR MAX PG: 26 MMHG
TRICUSPID ANNULAR PLANE SYSTOLIC EXCURSION: 1.5 CM
TRIGL SERPL-MCNC: 85 MG/DL (ref 30–150)
TROPONIN I SERPL DL<=0.01 NG/ML-MCNC: <0.006 NG/ML (ref 0–0.03)
TSH SERPL DL<=0.005 MIU/L-ACNC: 1.46 UIU/ML (ref 0.4–4)
TV REST PULMONARY ARTERY PRESSURE: 29 MMHG
URN SPEC COLLECT METH UR: ABNORMAL
UROBILINOGEN UR STRIP-ACNC: 1 EU/DL
WBC # BLD AUTO: 7.77 K/UL (ref 3.9–12.7)
Z-SCORE OF LEFT VENTRICULAR DIMENSION IN END DIASTOLE: -2.64
Z-SCORE OF LEFT VENTRICULAR DIMENSION IN END SYSTOLE: -1.92

## 2024-11-05 PROCEDURE — 96374 THER/PROPH/DIAG INJ IV PUSH: CPT

## 2024-11-05 PROCEDURE — 63600175 PHARM REV CODE 636 W HCPCS: Mod: JZ,JG | Performed by: NURSE PRACTITIONER

## 2024-11-05 PROCEDURE — 92610 EVALUATE SWALLOWING FUNCTION: CPT

## 2024-11-05 PROCEDURE — 51701 INSERT BLADDER CATHETER: CPT

## 2024-11-05 PROCEDURE — 99233 SBSQ HOSP IP/OBS HIGH 50: CPT | Mod: ,,, | Performed by: STUDENT IN AN ORGANIZED HEALTH CARE EDUCATION/TRAINING PROGRAM

## 2024-11-05 PROCEDURE — 96372 THER/PROPH/DIAG INJ SC/IM: CPT | Performed by: STUDENT IN AN ORGANIZED HEALTH CARE EDUCATION/TRAINING PROGRAM

## 2024-11-05 PROCEDURE — G0378 HOSPITAL OBSERVATION PER HR: HCPCS

## 2024-11-05 PROCEDURE — 81003 URINALYSIS AUTO W/O SCOPE: CPT | Performed by: NURSE PRACTITIONER

## 2024-11-05 PROCEDURE — 96372 THER/PROPH/DIAG INJ SC/IM: CPT | Performed by: NURSE PRACTITIONER

## 2024-11-05 PROCEDURE — 84484 ASSAY OF TROPONIN QUANT: CPT | Performed by: NURSE PRACTITIONER

## 2024-11-05 PROCEDURE — 63600175 PHARM REV CODE 636 W HCPCS: Mod: JZ,JG | Performed by: STUDENT IN AN ORGANIZED HEALTH CARE EDUCATION/TRAINING PROGRAM

## 2024-11-05 PROCEDURE — 94760 N-INVAS EAR/PLS OXIMETRY 1: CPT

## 2024-11-05 PROCEDURE — 85610 PROTHROMBIN TIME: CPT | Performed by: NURSE PRACTITIONER

## 2024-11-05 PROCEDURE — 25000003 PHARM REV CODE 250: Performed by: NURSE PRACTITIONER

## 2024-11-05 PROCEDURE — 84100 ASSAY OF PHOSPHORUS: CPT | Performed by: NURSE PRACTITIONER

## 2024-11-05 PROCEDURE — 51798 US URINE CAPACITY MEASURE: CPT

## 2024-11-05 PROCEDURE — 85025 COMPLETE CBC W/AUTO DIFF WBC: CPT | Performed by: NURSE PRACTITIONER

## 2024-11-05 PROCEDURE — 80053 COMPREHEN METABOLIC PANEL: CPT | Performed by: NURSE PRACTITIONER

## 2024-11-05 PROCEDURE — 80061 LIPID PANEL: CPT | Performed by: NURSE PRACTITIONER

## 2024-11-05 PROCEDURE — 97163 PT EVAL HIGH COMPLEX 45 MIN: CPT

## 2024-11-05 PROCEDURE — 94761 N-INVAS EAR/PLS OXIMETRY MLT: CPT

## 2024-11-05 PROCEDURE — 51702 INSERT TEMP BLADDER CATH: CPT

## 2024-11-05 PROCEDURE — 25500020 PHARM REV CODE 255: Performed by: STUDENT IN AN ORGANIZED HEALTH CARE EDUCATION/TRAINING PROGRAM

## 2024-11-05 PROCEDURE — 63600175 PHARM REV CODE 636 W HCPCS: Performed by: NURSE PRACTITIONER

## 2024-11-05 PROCEDURE — 83036 HEMOGLOBIN GLYCOSYLATED A1C: CPT | Performed by: NURSE PRACTITIONER

## 2024-11-05 PROCEDURE — 85730 THROMBOPLASTIN TIME PARTIAL: CPT | Performed by: NURSE PRACTITIONER

## 2024-11-05 PROCEDURE — 84443 ASSAY THYROID STIM HORMONE: CPT | Performed by: NURSE PRACTITIONER

## 2024-11-05 PROCEDURE — 83735 ASSAY OF MAGNESIUM: CPT | Performed by: NURSE PRACTITIONER

## 2024-11-05 RX ORDER — OLANZAPINE 10 MG/2ML
5 INJECTION, POWDER, FOR SOLUTION INTRAMUSCULAR EVERY 8 HOURS PRN
Status: DISCONTINUED | OUTPATIENT
Start: 2024-11-05 | End: 2024-11-08 | Stop reason: HOSPADM

## 2024-11-05 RX ORDER — HALOPERIDOL 5 MG/ML
2 INJECTION INTRAMUSCULAR ONCE
Status: COMPLETED | OUTPATIENT
Start: 2024-11-05 | End: 2024-11-05

## 2024-11-05 RX ORDER — DIPHENHYDRAMINE HYDROCHLORIDE 50 MG/ML
50 INJECTION INTRAMUSCULAR; INTRAVENOUS EVERY 6 HOURS PRN
Status: DISCONTINUED | OUTPATIENT
Start: 2024-11-05 | End: 2024-11-05

## 2024-11-05 RX ORDER — OLANZAPINE 10 MG/2ML
5 INJECTION, POWDER, FOR SOLUTION INTRAMUSCULAR ONCE AS NEEDED
Status: COMPLETED | OUTPATIENT
Start: 2024-11-05 | End: 2024-11-05

## 2024-11-05 RX ORDER — QUETIAPINE FUMARATE 25 MG/1
50 TABLET, FILM COATED ORAL NIGHTLY
Status: DISCONTINUED | OUTPATIENT
Start: 2024-11-05 | End: 2024-11-06

## 2024-11-05 RX ADMIN — OLANZAPINE 5 MG: 10 INJECTION, POWDER, FOR SOLUTION INTRAMUSCULAR at 11:11

## 2024-11-05 RX ADMIN — OLANZAPINE 5 MG: 10 INJECTION, POWDER, FOR SOLUTION INTRAMUSCULAR at 08:11

## 2024-11-05 RX ADMIN — OLANZAPINE 5 MG: 10 INJECTION, POWDER, FOR SOLUTION INTRAMUSCULAR at 04:11

## 2024-11-05 RX ADMIN — DIPHENHYDRAMINE HYDROCHLORIDE 50 MG: 50 INJECTION INTRAMUSCULAR; INTRAVENOUS at 01:11

## 2024-11-05 RX ADMIN — IOHEXOL 75 ML: 350 INJECTION, SOLUTION INTRAVENOUS at 01:11

## 2024-11-05 RX ADMIN — MEMANTINE HYDROCHLORIDE 5 MG: 5 TABLET ORAL at 08:11

## 2024-11-05 RX ADMIN — APIXABAN 2.5 MG: 2.5 TABLET, FILM COATED ORAL at 08:11

## 2024-11-05 RX ADMIN — PANTOPRAZOLE SODIUM 40 MG: 40 TABLET, DELAYED RELEASE ORAL at 08:11

## 2024-11-05 RX ADMIN — HALOPERIDOL LACTATE 2 MG: 5 INJECTION, SOLUTION INTRAMUSCULAR at 10:11

## 2024-11-05 RX ADMIN — ATORVASTATIN CALCIUM 40 MG: 40 TABLET, FILM COATED ORAL at 08:11

## 2024-11-05 RX ADMIN — AMIODARONE HYDROCHLORIDE 200 MG: 100 TABLET ORAL at 08:11

## 2024-11-05 NOTE — ASSESSMENT & PLAN NOTE
Chronic, Latest blood pressure and vitals reviewed-   Temp:  [97.9 °F (36.6 °C)]   Pulse:  [60-65]   Resp:  [16-20]   BP: (134-172)/(64-80)   SpO2:  [95 %-97 %] .   Home meds for hypertension were reviewed and noted below.   Hypertension Medications               furosemide (LASIX) 20 MG tablet Take 20 mg by mouth once daily.     -Per family-takes lasix as needed for swelling  -Monitor and trend vital signs q4hr  -Will utilize p.r.n. blood pressure medication only if patient's blood pressure greater than  180/110 and she develops symptoms such as worsening chest pain or shortness of breath.

## 2024-11-05 NOTE — HPI
[place] is [place] 84-year[place]old female with a PMHx of HTN, gout, [place]. Dementia, PAF on eliquis, stroke, frequent UTI who presented to the ED for the evaluation of possible UTI. Family at bedside report about Thursday or Friday was last normal self. Has dementia but nothing out of the ordinary. Says Saturday is when she was walking around without clothes on, wanting to go places. Says her urine has a foul odor and can smell it in the other room. Positive for pain on urination. Last few days they have been breaking up pills due to her trouble swallowing. Says she has a rolling walker and lately she has not been able to ambulate and looks fatigue. Says he has trouble lifting up the right leg and cannot lifting right arm. Says she been having trouble with her right shoulder and arm for the past 2 months. Says she has always had issues with her foot and went to a specialist and they said the ligament is almost about. Says they give her water constantly, she drinks the body armor drinks and sugar free root bear, ensure. She will only really eat breakfast. Says yesterday they attempted to take her to urgent care but she refused to get out of the car. Says she recently was placed on tramadol once a day for pain about 3 weeks ago. They report she's been hallucinating seeing  family member. Denies any fevers, chills, abdominal pain, changes in bowel or bladder frequency.    In the ED, labs pretty unremarkable. CTH: No CT evidence of acute intracranial abnormality. Clinical correlation and further evaluation as warranted. Chronic senescent and microvascular ischemic changes. Right knee XRY: Mild tricompartmental degenerative osteoarthrosis with small osteophyte formation. Joint spaces are preserved. Normal alignment. No significant suprapatellar effusion. Per ED provider, patient needing admission for stroke workup and deconditioning and MRI. Will admit for further monitoring and treatment.  
29-Sep-2020 10:34

## 2024-11-05 NOTE — PROGRESS NOTES
[facility] Medicine  Progress Note    Patient Name: Theresa Hook  MRN: [id number]  Patient Class: OP- Observation   Admission Date: 2024  Length of Stay: 0 days  Attending Physician: Tomy Cage MD  Primary Care Provider: Josette Lino MD        Subjective:     Principal Problem:Stroke-like symptoms        HPI:  Theresa Hook is an 84-year-old female with a PMHx of HTN, gout, [place]. Dementia, PAF on eliquis, stroke, frequent UTI who presented to the ED for the evaluation of possible UTI. Family at bedside report about Thursday or Friday was last normal self. Has dementia but nothing out of the ordinary. Says Saturday is when she was walking around without clothes on, wanting to go places. Says her urine has a foul odor and can smell it in the other room. Positive for pain on urination. Last few days they have been breaking up pills due to her trouble swallowing. Says she has a rolling walker and lately she has not been able to ambulate and looks fatigue. Says he has trouble lifting up the right leg and cannot lifting right arm. Says she been having trouble with her right shoulder and arm for the past 2 months. Says she has always had issues with her foot and went to a specialist and they said the ligament is almost about. Says they give her water constantly, she drinks the body armor drinks and sugar free root bear, ensure. She will only really eat breakfast. Says yesterday they attempted to take her to urgent care but she refused to get out of the car. Says she recently was placed on tramadol once a day for pain about 3 weeks ago. They report she's been hallucinating seeing  family member. Denies any fevers, chills, abdominal pain, changes in bowel or bladder frequency.    In the ED, labs pretty unremarkable. CTH: No CT evidence of acute intracranial abnormality. Clinical correlation and further evaluation as warranted. Chronic senescent and microvascular  ischemic changes. Right knee XRY: Mild tricompartmental degenerative osteoarthrosis with small osteophyte formation. Joint spaces are preserved. Normal alignment. No significant suprapatellar effusion. Per ED provider, patient needing admission for stroke workup and deconditioning and MRI. Will admit for further monitoring and treatment.    Overview/Hospital Course:  No notes on file    Interval History: patient required multiple PRN doses of medications for agitation overnight. Somnolent on exam this morning. Arouses easily and following commands. MRI this am. Will do CTA and consult neurology if positive for CVA. Hold on ASA for now given patient already on eliquis.    Review of Systems   All other systems reviewed and are negative.    Objective:     Vital Signs (Most Recent):  Temp: 97.7 °F (36.5 °C) (11/05/24 0710)  Pulse: (!) 53 (11/05/24 0800)  Resp: 20 (11/05/24 0710)  BP: (!) 142/70 (11/05/24 0710)  SpO2: 98 % (11/05/24 0710) Vital Signs (24h Range):  Temp:  [96.6 °F (35.9 °C)-98.2 °F (36.8 °C)] 97.7 °F (36.5 °C)  Pulse:  [52-65] 53  Resp:  [15-20] 20  SpO2:  [95 %-98 %] 98 %  BP: (134-181)/(63-84) 142/70     Weight: 68.8 kg (151 lb 10.8 oz)  Body mass index is 23.75 kg/m².    Intake/Output Summary (Last 24 hours) at 11/5/2024 1100  Last data filed at 11/5/2024 0000  Gross per 24 hour   Intake --   Output 750 ml   Net -750 ml         Physical Exam      Vitals reviewed  General: NAD, elderly, frail appearing  Head: NC/AT  Eyes: EOMI  Cardiovascular: sinus bradycardia  Pulmonary: Normal Respiratory Rate, No respiratory distress  Gi: Soft, Non-tender  Extremities: Warm, No edema present  Skin: Warm, dry  Neuro: Alert, Oriented x1, No focal Deficit appreciated but poor effort on exam.   Psych: Appropriate mood and affect      Significant Labs: All pertinent labs within the past 24 hours have been reviewed.    Significant Imaging: I have reviewed all pertinent imaging results/findings within the past 24  hours.    Assessment/Plan:      * Stroke-like symptoms  - History of prior stroke; Right-sided weakness increased  - start Atorvastatin 40mg daily  - Resume home eliquis  - Labs: A1c, TSH, B12, Folate, Lipid Panel-pending  - Imaging: MRI Brain w.o contrast-pending  - Cardiac imaging: TTE   - PT/OT eval  - Allow for permissive HTN to 220 over next 24 hrs, then can resume home medications  - -180 for now    Moderate dementia, unspecified dementia type, unspecified whether behavioral, psychotic, or mood disturbance or anxiety  -Patient currently AAOx1; worsening dementia?,  -Resume home meds  -Continue non-pharmacologic interventions to prevent delirium (No VS between 11PM-5AM, activity during day, opening blinds, providing glasses/hearing aids, and up in chair during daytime).   -Will use PRN anti-psychotics to prevent behavior of self harm during sundowning, and avoid narcotics and benzos unless absolutely necessary.   -Will provide PRN anti-psychotics to avoid self harm behaviors if necessary     Gastroesophageal reflux disease  -Home omeprazole not on hospital formulary-ordered protonix    Coronary artery disease of native artery of native heart with stable angina pectoris  S/P angioplasty with stent,    -Patient with known CAD s/p stent placement, which is controlled   -Will continue  eliquis and Statin and monitor for S/Sx of angina/ACS.   -Continue to monitor on telemetry.       Gait instability  Right knee Xray: Mild tricompartmental degenerative osteoarthrosis with small osteophyte formation.  Joint spaces are preserved.  Normal alignment.  No significant suprapatellar effusion.  -fall precautions  -PT/OT evaluation    Paroxysmal atrial fibrillation  Patient has paroxysmal (<7 days) atrial fibrillation. Patient is currently in sinus rhythm. [id number] Score: 4. The patients heart rate in the last 24 hours is as follows:  Pulse  Min: 60  Max: 65     Antiarrhythmics  amiodarone tablet 200 mg, Daily,  Oral    Anticoagulants  apixaban tablet 2.5 mg, 2 times daily, Oral    Plan  - Replete lytes with a goal of K>4, Mg >2  - Patient is anticoagulated, see medications listed above.  - Patient's afib is currently controlled    HTN (hypertension)  Chronic, Latest blood pressure and vitals reviewed-   Temp:  [97.9 °F (36.6 °C)]   Pulse:  [60-65]   Resp:  [16-20]   BP: (134-172)/(64-80)   SpO2:  [95 %-97 %] .   Home meds for hypertension were reviewed and noted below.   Hypertension Medications               furosemide (LASIX) 20 MG tablet Take 20 mg by mouth once daily.     -Per family-takes lasix as needed for swelling  -Monitor and trend vital signs q4hr  -Will utilize p.r.n. blood pressure medication only if patient's blood pressure greater than  180/110 and she develops symptoms such as worsening chest pain or shortness of breath.        VTE Risk Mitigation (From admission, onward)           Ordered     apixaban tablet 2.5 mg  2 times daily         11/04/24 2019     Reason for No Pharmacological VTE Prophylaxis  Once        Question:  Reasons:  Answer:  Already adequately anticoagulated on oral Anticoagulants    11/04/24 2015     IP VTE HIGH RISK PATIENT  Once         11/04/24 2015     Place sequential compression device  Until discontinued         11/04/24 2015                    Discharge Planning   AMY: 11/7/2024     Code Status: Full Code   Is the patient medically ready for discharge?:     Reason for patient still in hospital (select all that apply): Treatment                     Tomy Cage MD  Department of Hospital Medicine   [facility]

## 2024-11-05 NOTE — PLAN OF CARE
11/05/24 1357   REESE Message   Medicare Outpatient and Observation Notification regarding financial responsibility Explained to patient/caregiver   Date REESE was signed 11/05/24   Time REESE was signed [id number]

## 2024-11-05 NOTE — PLAN OF CARE
Problem: Stroke, Ischemic (Includes Transient Ischemic Attack)  Goal: Optimal Coping  Outcome: Progressing  Goal: Optimal Functional Ability  Outcome: Progressing  Goal: Optimal Nutrition Intake  Outcome: Progressing  Goal: Effective Oxygenation and Ventilation  Outcome: Progressing  Goal: Improved Sensorimotor Function  Outcome: Progressing     Problem: Adult Inpatient Plan of Care  Goal: Plan of Care Review  Outcome: Progressing  Goal: Patient-Specific Goal (Individualized)  Outcome: Progressing  Goal: Absence of Hospital-Acquired Illness or Injury  Outcome: Progressing  Goal: Optimal Comfort and Wellbeing  Outcome: Progressing   No acute events overnight. Pt VSS. Purposeful rounding complete. All questions answered. Call light within reach. Bed in locked and low position.

## 2024-11-05 NOTE — CONSULTS
[place] [facility]  Adult Nutrition   Consult Note (Nutrition Education)     SUMMARY     Recommendations  Heart Healthy education materials provided for family review.     Lipid panel WNL. Pt with moderate dementia. Will try to provide education to family prior to discharge.    Labs: Pertinent Labs Reviewed  Clinical Chemistry:    Lipid Panel:  Recent Labs   Lab 11/05/24  0633   CHOL 186   HDL 71   LDLCALC 98.0   TRIG 85   CHOLHDL 38.2

## 2024-11-05 NOTE — ASSESSMENT & PLAN NOTE
- History of prior stroke; Right-sided weakness increased  - begin ASA 81 mg daily  - start Atorvastatin 40mg daily  - Resume home eliquis  - Labs: A1c, TSH, B12, Folate, Lipid Panel-pending  - Imaging: MRI Brain w.o contrast-pending  - Cardiac imaging: TTE with bubble study  - PT/OT eval  - Allow for permissive HTN to 220 over next 24 hrs, then can resume home medications  - -180 for now

## 2024-11-05 NOTE — ED NOTES
Pt report received from JIMENA Pineda.  Pt resting in bed at this time.  Family at bedside.  Pt awake, alert, and oriented to self.  Unable to answer questions appropriately at this time.  Pt denies any concerns at this time.  Speaking with pt family at this time regarding plan of care.  Pt and family agreeable to plan at this time.  No concerns brought forth.  All questions answered to family's liking.  No other needs voiced at this time.  NAD noted.
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
## 2024-11-05 NOTE — ASSESSMENT & PLAN NOTE
Right knee Xray: Mild tricompartmental degenerative osteoarthrosis with small osteophyte formation.  Joint spaces are preserved.  Normal alignment.  No significant suprapatellar effusion.  -fall precautions  -PT/OT evaluation

## 2024-11-05 NOTE — PLAN OF CARE
11/05/24 1357   REESE Message   Medicare Outpatient and Observation Notification regarding financial responsibility Explained to patient/caregiver   Date REESE was signed 11/05/24   Time REESE was signed 1345     Information via phone to [facility] at [phone]

## 2024-11-05 NOTE — ASSESSMENT & PLAN NOTE
- History of prior stroke; Right-sided weakness increased  - start Atorvastatin 40mg daily  - Resume home eliquis  - Labs: A1c, TSH, B12, Folate, Lipid Panel-pending  - Imaging: MRI Brain w.o contrast-pending  - Cardiac imaging: TTE   - PT/OT eval  - Allow for permissive HTN to 220 over next 24 hrs, then can resume home medications  - -180 for now

## 2024-11-05 NOTE — PLAN OF CARE
met at bedside of patient with her sitter and caregiver. To discuss discharge plan and observe patients needs for case management. Patient presented not orientated to what was going on. Socail Worker spoke with her caregiver Vanita. She reported that there patient two days ago was totally normal cognitively and does not know what is going on with her. Patient has three care givers and has someone with her at all times in her home to care for her. Vanita gave her daughter number to her [phone] and asked SW to call for more information on needs.Vanita asked for Hh for the Patient to receive PT.   Sw contacted daughter Liz she lives in [place] and stated her mother has been having care for a year now around the clock and soon will need funds.Patient has many family members locally and [place] SIDNEY explained Longterm Medicaid for future services for the family. A list of local nursing homes were provided over the phone. Patient will be provided with preferences for Hh when adequet timing is provided. Patient will return homefor discharge.

## 2024-11-05 NOTE — SUBJECTIVE & OBJECTIVE
"Past Medical History:   Diagnosis Date    A-fib     Amblyopia     right eye    Breast cancer 2005    left    Dementia     Diarrhea 10/30/2012    Dry eye syndrome     HTN (hypertension) 08/14/2012    Interval gout 08/14/2012    Keratoconus of right eye     Stroke 06/2016    Dr. Rocha       Past Surgical History:   Procedure Laterality Date    APPENDECTOMY      BARTHOLIN GLAND CYST EXCISION      BREAST LUMPECTOMY Left 2005    CARDIAC CATHETERIZATION      CATARACT EXTRACTION  03/30/2009    right eye    CATARACT EXTRACTION  04/13/2009    left eye    COLONOSCOPY      ESOPHAGOGASTRODUODENOSCOPY      EYE SURGERY      HYSTERECTOMY  10/22/2015    spine cyst surgery      UPPER GASTROINTESTINAL ENDOSCOPY         Review of patient's allergies indicates:   Allergen Reactions    Sulfa (sulfonamide antibiotics)      Other reaction(s): Rash  Other reaction(s): Dizziness    Collagen      Other reaction(s): burning  Other reaction(s): Swelling  Other reaction(s): Rash  Other reaction(s): Hallucinations    Erythromycin      Other reaction(s): Rash    Quinolones Swelling     Other reaction(s): concern b/o chronic tendonitis    Sulfacetamide sodium Other (See Comments)     "spots in front of my eyes and dizzy"    Sulfasalazine Other (See Comments)     "spots in front of my eyes and dizzy"    Tetracycline      Other reaction(s): Rash       No current facility-administered medications on file prior to encounter.     Current Outpatient Medications on File Prior to Encounter   Medication Sig    amiodarone (PACERONE) 200 MG Tab Take by mouth once daily.    apixaban (ELIQUIS) 2.5 mg Tab Take 2.5 mg by mouth 2 (two) times daily.    b complex vitamins tablet Take 1 tablet by mouth once daily.    esomeprazole (NEXIUM) 20 MG capsule Take 20 mg by mouth once daily.    furosemide (LASIX) 20 MG tablet Take 20 mg by mouth once daily.    gabapentin (NEURONTIN) 300 MG capsule     memantine (NAMENDA) 5 MG Tab Take 5 mg by mouth.    pravastatin " (PRAVACHOL) 40 MG tablet Take 1 tablet (40 mg total) by mouth once daily.    traMADoL (ULTRAM) 50 mg tablet Take 1 tablet (50 mg total) by mouth every 12 (twelve) hours as needed for Pain.    walker (ULTRA-LIGHT ROLLATOR MISC) Rollator, See Instructions, use for ambulation-69., # 1 EA, 0 Refill(s)     Family History       Problem Relation (Age of Onset)    Cancer Brother, Paternal Grandmother, Cousin, Maternal Grandmother    Diabetes Mother, Sister    Heart disease Sister          Tobacco Use    Smoking status: Never     Passive exposure: Current    Smokeless tobacco: Never   Substance and Sexual Activity    Alcohol use: Yes     Alcohol/week: 4.0 standard drinks of alcohol     Types: 1 Glasses of wine, 3 Shots of liquor per week     Comment: social    Drug use: No    Sexual activity: Not Currently     Birth control/protection: Post-menopausal     Review of Systems   Unable to perform ROS: Dementia     Objective:     Vital Signs (Most Recent):  Temp: 97.9 °F (36.6 °C) (11/04/24 1514)  Pulse: 61 (11/04/24 1815)  Resp: 16 (11/04/24 1748)  BP: 134/64 (11/04/24 1815)  SpO2: 97 % (11/04/24 1815) Vital Signs (24h Range):  Temp:  [97.9 °F (36.6 °C)] 97.9 °F (36.6 °C)  Pulse:  [60-65] 61  Resp:  [16-20] 16  SpO2:  [95 %-97 %] 97 %  BP: (134-172)/(64-80) 134/64     Weight: 68.5 kg (151 lb)  Body mass index is 23.65 kg/m².     Physical Exam  Vitals and nursing note reviewed. Exam conducted with a chaperone present.   Constitutional:       General: She is not in acute distress.     Appearance: She is not ill-appearing or toxic-appearing.   HENT:      Head: Normocephalic and atraumatic.   Eyes:      Extraocular Movements: Extraocular movements intact.   Pulmonary:      Effort: Pulmonary effort is normal.   Musculoskeletal:      Cervical back: Normal range of motion.   Neurological:      Mental Status: She is alert. She is disoriented.   Psychiatric:         Behavior: Behavior normal.                Significant Labs: All  pertinent labs within the past 24 hours have been reviewed.    Significant Imaging: I have reviewed all pertinent imaging results/findings within the past 24 hours.

## 2024-11-05 NOTE — NURSING
Pt had no output on file since 1514. Bladder scan showed 550 mL urine. Straight cath produced 750 mL urine.Patient tolerated well. Doctor notified and urine sample sent to lab.

## 2024-11-05 NOTE — PLAN OF CARE
Problem: Stroke, Ischemic (Includes Transient Ischemic Attack)  Goal: Optimal Coping  Outcome: Progressing  Goal: Effective Bowel Elimination  Outcome: Progressing  Goal: Optimal Cerebral Tissue Perfusion  Outcome: Progressing  Goal: Optimal Cognitive Function  Outcome: Not Progressing  Goal: Improved Communication Skills  Outcome: Not Progressing  Goal: Optimal Functional Ability  Outcome: Not Progressing  Goal: Optimal Nutrition Intake  Outcome: Not Progressing  Goal: Effective Oxygenation and Ventilation  Outcome: Progressing  Goal: Improved Sensorimotor Function  Outcome: Progressing  Goal: Safe and Effective Swallow  Outcome: Progressing  Goal: Effective Urinary Elimination  Outcome: Not Progressing     Problem: Fall Injury Risk  Goal: Absence of Fall and Fall-Related Injury  Outcome: Progressing     Problem: Adult Inpatient Plan of Care  Goal: Plan of Care Review  Outcome: Progressing  Goal: Patient-Specific Goal (Individualized)  Outcome: Progressing  Goal: Absence of Hospital-Acquired Illness or Injury  Outcome: Progressing  Goal: Optimal Comfort and Wellbeing  Outcome: Progressing  Goal: Readiness for Transition of Care  Outcome: Progressing     Problem: Skin Injury Risk Increased  Goal: Skin Health and Integrity  Outcome: Progressing

## 2024-11-05 NOTE — ASSESSMENT & PLAN NOTE
-Patient currently AAOx1; worsening dementia?,  -Resume home meds  -Continue non-pharmacologic interventions to prevent delirium (No VS between 11PM-5AM, activity during day, opening blinds, providing glasses/hearing aids, and up in chair during daytime).   -Will use PRN anti-psychotics to prevent behavior of self harm during sundowning, and avoid narcotics and benzos unless absolutely necessary.   -Will provide PRN anti-psychotics to avoid self harm behaviors if necessary

## 2024-11-05 NOTE — SUBJECTIVE & OBJECTIVE
Interval History: patient required multiple PRN doses of medications for agitation overnight. Somnolent on exam this morning. Arouses easily and following commands. MRI this am. Will do CTA and consult neurology if positive for CVA. Hold on ASA for now given patient already on eliquis.    Review of Systems   All other systems reviewed and are negative.    Objective:     Vital Signs (Most Recent):  Temp: 97.7 °F (36.5 °C) (11/05/24 0710)  Pulse: (!) 53 (11/05/24 0800)  Resp: 20 (11/05/24 0710)  BP: (!) 142/70 (11/05/24 0710)  SpO2: 98 % (11/05/24 0710) Vital Signs (24h Range):  Temp:  [96.6 °F (35.9 °C)-98.2 °F (36.8 °C)] 97.7 °F (36.5 °C)  Pulse:  [52-65] 53  Resp:  [15-20] 20  SpO2:  [95 %-98 %] 98 %  BP: (134-181)/(63-84) 142/70     Weight: 68.8 kg (151 lb 10.8 oz)  Body mass index is 23.75 kg/m².    Intake/Output Summary (Last 24 hours) at 11/5/2024 1100  Last data filed at 11/5/2024 0000  Gross per 24 hour   Intake --   Output 750 ml   Net -750 ml         Physical Exam      Vitals reviewed  General: NAD, elderly, frail appearing  Head: NC/AT  Eyes: EOMI  Cardiovascular: sinus bradycardia  Pulmonary: Normal Respiratory Rate, No respiratory distress  Gi: Soft, Non-tender  Extremities: Warm, No edema present  Skin: Warm, dry  Neuro: Alert, Oriented x1, No focal Deficit appreciated but poor effort on exam.   Psych: Appropriate mood and affect      Significant Labs: All pertinent labs within the past 24 hours have been reviewed.    Significant Imaging: I have reviewed all pertinent imaging results/findings within the past 24 hours.   Heterosexual

## 2024-11-05 NOTE — H&P
[facility] Medicine  History & Physical    Patient Name: Theresa Hook  MRN: [id number]  Admission Date: 11/4/2024  Attending Physician: Tomy Cage MD   Primary Care Provider: Josette Lino MD         Patient information was obtained from relative(s), past medical records, and ER records.       Subjective:     Principal Problem:Stroke-like symptoms    Chief Complaint:   Chief Complaint   Patient presents with    Dysuria     Foul smelling urine x 3-4 days    Dizziness     Caregiver reports pt has been having trouble walking and increased confusion over the last 2-3 days. Caregiver concerned pt may have a UTI.        HPI: Theresa Hook is an 84-year-old female with a PMHx of HTN, gout, [place]. Dementia, PAF on eliquis, stroke, frequent UTI who presented to the ED for the evaluation of possible UTI. Family at bedside report about Thursday or Friday was last normal self. Has dementia but nothing out of the ordinary. Says Saturday is when she was walking around without clothes on, wanting to go places. Says her urine has a foul odor and can smell it in the other room. Positive for pain on urination. Last few days they have been breaking up pills due to her trouble swallowing. Says she has a rolling walker and lately she has not been able to ambulate and looks fatigue. Says he has trouble lifting up the right leg and cannot lifting right arm. Says she been having trouble with her right shoulder and arm for the past 2 months. Says she has always had issues with her foot and went to a specialist and they said the ligament is almost about. Says they give her water constantly, she drinks the body armor drinks and sugar free root bear, ensure. She will only really eat breakfast. Says yesterday they attempted to take her to urgent care but she refused to get out of the car. Says she recently was placed on tramadol once a day for pain about 3 weeks ago. They report she's been  "hallucinating seeing  family member. Denies any fevers, chills, abdominal pain, changes in bowel or bladder frequency.    In the ED, labs pretty unremarkable. CTH: No CT evidence of acute intracranial abnormality. Clinical correlation and further evaluation as warranted. Chronic senescent and microvascular ischemic changes. Right knee XRY: Mild tricompartmental degenerative osteoarthrosis with small osteophyte formation. Joint spaces are preserved. Normal alignment. No significant suprapatellar effusion. Per ED provider, patient needing admission for stroke workup and deconditioning and MRI. Will admit for further monitoring and treatment.    Past Medical History:   Diagnosis Date    A-fib     Amblyopia     right eye    Breast cancer 2005    left    Dementia     Diarrhea 10/30/2012    Dry eye syndrome     HTN (hypertension) 2012    Interval gout 2012    Keratoconus of right eye     Stroke 2016    Dr. Rocha       Past Surgical History:   Procedure Laterality Date    APPENDECTOMY      BARTHOLIN GLAND CYST EXCISION      BREAST LUMPECTOMY Left 2005    CARDIAC CATHETERIZATION      CATARACT EXTRACTION  2009    right eye    CATARACT EXTRACTION  2009    left eye    COLONOSCOPY      ESOPHAGOGASTRODUODENOSCOPY      EYE SURGERY      HYSTERECTOMY  10/22/2015    spine cyst surgery      UPPER GASTROINTESTINAL ENDOSCOPY         Review of patient's allergies indicates:   Allergen Reactions    Sulfa (sulfonamide antibiotics)      Other reaction(s): Rash  Other reaction(s): Dizziness    Collagen      Other reaction(s): burning  Other reaction(s): Swelling  Other reaction(s): Rash  Other reaction(s): Hallucinations    Erythromycin      Other reaction(s): Rash    Quinolones Swelling     Other reaction(s): concern b/o chronic tendonitis    Sulfacetamide sodium Other (See Comments)     "spots in front of my eyes and dizzy"    Sulfasalazine Other (See Comments)     "spots in front of my eyes and dizzy" "    Tetracycline      Other reaction(s): Rash       No current facility-administered medications on file prior to encounter.     Current Outpatient Medications on File Prior to Encounter   Medication Sig    amiodarone (PACERONE) 200 MG Tab Take by mouth once daily.    apixaban (ELIQUIS) 2.5 mg Tab Take 2.5 mg by mouth 2 (two) times daily.    b complex vitamins tablet Take 1 tablet by mouth once daily.    esomeprazole (NEXIUM) 20 MG capsule Take 20 mg by mouth once daily.    furosemide (LASIX) 20 MG tablet Take 20 mg by mouth once daily.    gabapentin (NEURONTIN) 300 MG capsule     memantine (NAMENDA) 5 MG Tab Take 5 mg by mouth.    pravastatin (PRAVACHOL) 40 MG tablet Take 1 tablet (40 mg total) by mouth once daily.    traMADoL (ULTRAM) 50 mg tablet Take 1 tablet (50 mg total) by mouth every 12 (twelve) hours as needed for Pain.    walker (ULTRA-LIGHT ROLLATOR MISC) Rollator, See Instructions, use for ambulation-69., # 1 EA, 0 Refill(s)     Family History       Problem Relation (Age of Onset)    Cancer Brother, Paternal Grandmother, Cousin, Maternal Grandmother    Diabetes Mother, Sister    Heart disease Sister          Tobacco Use    Smoking status: Never     Passive exposure: Current    Smokeless tobacco: Never   Substance and Sexual Activity    Alcohol use: Yes     Alcohol/week: 4.0 standard drinks of alcohol     Types: 1 Glasses of wine, 3 Shots of liquor per week     Comment: social    Drug use: No    Sexual activity: Not Currently     Birth control/protection: Post-menopausal     Review of Systems   Unable to perform ROS: Dementia     Objective:     Vital Signs (Most Recent):  Temp: 97.9 °F (36.6 °C) (11/04/24 1514)  Pulse: 61 (11/04/24 1815)  Resp: 16 (11/04/24 1748)  BP: 134/64 (11/04/24 1815)  SpO2: 97 % (11/04/24 1815) Vital Signs (24h Range):  Temp:  [97.9 °F (36.6 °C)] 97.9 °F (36.6 °C)  Pulse:  [60-65] 61  Resp:  [16-20] 16  SpO2:  [95 %-97 %] 97 %  BP: (134-172)/(64-80) 134/64     Weight: 68.5 kg (151  lb)  Body mass index is 23.65 kg/m².     Physical Exam  Vitals and nursing note reviewed. Exam conducted with a chaperone present.   Constitutional:       General: She is not in acute distress.     Appearance: She is not ill-appearing or toxic-appearing.   HENT:      Head: Normocephalic and atraumatic.   Eyes:      Extraocular Movements: Extraocular movements intact.   Pulmonary:      Effort: Pulmonary effort is normal.   Musculoskeletal:      Cervical back: Normal range of motion.   Neurological:      Mental Status: She is alert. She is disoriented.   Psychiatric:         Behavior: Behavior normal.                Significant Labs: All pertinent labs within the past 24 hours have been reviewed.    Significant Imaging: I have reviewed all pertinent imaging results/findings within the past 24 hours.  Assessment/Plan:     * Stroke-like symptoms  - History of prior stroke; Right-sided weakness increased  - begin ASA 81 mg daily  - start Atorvastatin 40mg daily  - Resume home eliquis  - Labs: A1c, TSH, B12, Folate, Lipid Panel-pending  - Imaging: MRI Brain w.o contrast-pending  - Cardiac imaging: TTE with bubble study  - PT/OT eval  - Allow for permissive HTN to 220 over next 24 hrs, then can resume home medications  - -180 for now    Moderate dementia, unspecified dementia type, unspecified whether behavioral, psychotic, or mood disturbance or anxiety  -Patient currently AAOx1; worsening dementia?,  -Resume home meds  -Continue non-pharmacologic interventions to prevent delirium (No VS between 11PM-5AM, activity during day, opening blinds, providing glasses/hearing aids, and up in chair during daytime).   -Will use PRN anti-psychotics to prevent behavior of self harm during sundowning, and avoid narcotics and benzos unless absolutely necessary.   -Will provide PRN anti-psychotics to avoid self harm behaviors if necessary     Gastroesophageal reflux disease  -Home omeprazole not on hospital formulary-ordered  protonix    Coronary artery disease of native artery of native heart with stable angina pectoris  S/P angioplasty with stent,    -Patient with known CAD s/p stent placement, which is controlled   -Will continue ASA and Statin and monitor for S/Sx of angina/ACS.   -Continue to monitor on telemetry.       Gait instability  Right knee Xray: Mild tricompartmental degenerative osteoarthrosis with small osteophyte formation.  Joint spaces are preserved.  Normal alignment.  No significant suprapatellar effusion.  -fall precautions  -PT/OT evaluation    Paroxysmal atrial fibrillation  Patient has paroxysmal (<7 days) atrial fibrillation. Patient is currently in sinus rhythm. [id number] Score: 4. The patients heart rate in the last 24 hours is as follows:  Pulse  Min: 60  Max: 65     Antiarrhythmics  amiodarone tablet 200 mg, Daily, Oral    Anticoagulants  apixaban tablet 2.5 mg, 2 times daily, Oral    Plan  - Replete lytes with a goal of K>4, Mg >2  - Patient is anticoagulated, see medications listed above.  - Patient's afib is currently controlled    HTN (hypertension)  Chronic, Latest blood pressure and vitals reviewed-   Temp:  [97.9 °F (36.6 °C)]   Pulse:  [60-65]   Resp:  [16-20]   BP: (134-172)/(64-80)   SpO2:  [95 %-97 %] .   Home meds for hypertension were reviewed and noted below.   Hypertension Medications               furosemide (LASIX) 20 MG tablet Take 20 mg by mouth once daily.     -Per family-takes lasix as needed for swelling  -Monitor and trend vital signs q4hr  -Will utilize p.r.n. blood pressure medication only if patient's blood pressure greater than  180/110 and she develops symptoms such as worsening chest pain or shortness of breath.        VTE Risk Mitigation (From admission, onward)           Ordered     apixaban tablet 2.5 mg  2 times daily         11/04/24 2019     Reason for No Pharmacological VTE Prophylaxis  Once        Question:  Reasons:  Answer:  Already adequately anticoagulated on  oral Anticoagulants    11/04/24 2015     IP VTE HIGH RISK PATIENT  Once         11/04/24 2015     Place sequential compression device  Until discontinued         11/04/24 2015                         The attending portion of this evaluation, treatment, and documentation was performed per Katerin Scott NP via Telemedicine AudioVisual using the secure [employer] software platform with 2 way audio/video. The provider was located off-site and the patient is located in the hospital. The aforementioned video software was utilized to document the relevant history and physical exam      On 11/04/2024, patient should be placed in hospital observation services under my care in collaboration with Tomy Cage MD.      Katerin Scott NP  Department of Hospital Medicine   [facility]

## 2024-11-05 NOTE — ASSESSMENT & PLAN NOTE
S/P angioplasty with stent,    -Patient with known CAD s/p stent placement, which is controlled   -Will continue  eliquis and Statin and monitor for S/Sx of angina/ACS.   -Continue to monitor on telemetry.

## 2024-11-05 NOTE — ASSESSMENT & PLAN NOTE
Patient has paroxysmal (<7 days) atrial fibrillation. Patient is currently in sinus rhythm. [id number] Score: 4. The patients heart rate in the last 24 hours is as follows:  Pulse  Min: 60  Max: 65     Antiarrhythmics  amiodarone tablet 200 mg, Daily, Oral    Anticoagulants  apixaban tablet 2.5 mg, 2 times daily, Oral    Plan  - Replete lytes with a goal of K>4, Mg >2  - Patient is anticoagulated, see medications listed above.  - Patient's afib is currently controlled

## 2024-11-05 NOTE — PT/OT/SLP EVAL
Physical Therapy Evaluation     Patient Name: Theresa Hook   MRN: [id number]  Recent Surgery: * No surgery found *      Recommendations:     Discharge Recommendations: Moderate Intensity Therapy (SNF)   Discharge Equipment Recommendations: wheelchair   Barriers to discharge: Increased level of assist and Ongoing medical treatment    Assessment:     Theresa Hook is a 84 y.o. female admitted with a medical diagnosis of Stroke-like symptoms. She presents with the following impairments/functional limitations: weakness, impaired functional mobility, gait instability, impaired balance, impaired cognition, decreased safety awareness.    The patient presented to the ED on 11/4/25 with complaints of right sided weakness, right knee pain, foul smelling urine, trouble walking, and increased confusion.  The patient is alert and oriented to self but unable to provide any history due to cognitive impairment.  One of her caregivers, Pia, is present and answered questions for the patient.    Rehab Prognosis: Fair; patient would benefit from acute PT services to address these deficits and reach maximum level of function.    Plan:     During this hospitalization, patient to be seen 5 x/week to address the above listed problems via gait training, therapeutic activities, therapeutic exercises    Plan of Care Expires:  (Upon hospital discharge)    Subjective     Chief Complaint: The patient does not have any complaints.  She report she is doing fine.  The patient's caregiver reports the patient's confusion is significantly worse over the last few days.      Patient Comments/Goals: None stated.  Pain/Comfort:  Pain Rating 1: 0/10    Social History:  Living Environment: Patient lives alone, but has 24 hour / 7 day per week caregivers, in  a condo  with  no steps to enter the residence.    Prior Level of Function: Prior to admission, patient was modified independent with ambulation sometimes using a rollator.  She required  assistance for bathing, grooming, and meal prep.    Equipment Used at Home: shower chair, rollator, grab bar    DME owned (not currently used): none    Assistance Upon Discharge:  paid caregivers.    Objective:     Communicated with nurse prior to session. Patient found supine with telemetry, peripheral IV (sitter) upon PT entry to room.    General Precautions: Standard, fall   Orthopedic Precautions: N/A   Braces: N/A    Respiratory Status: Room air    Exams:  Cognition: Patient is oriented to Person, Not oriented to place, Not oriented to time, and Not oriented to situation  RLE ROM: WNL  RLE Strength:  MMT was very difficult due to the patient having difficulty understanding and following commands.  However, she did move her right lower extremity in all planes against gravity.  LLE ROM: WNL  LLE Strength: MMT was very difficult due to the patient having difficulty understanding and following commands.  However, she did move her left lower extremity in all planes against gravity.  No significant differences noted in strength right lower extremity compared to left lower extremity in functional mobility.    Functional Mobility:  Gait belt applied - Yes  Bed Mobility  Rolling Left: minimum assistance  Rolling Right: minimum assistance  Supine to Sit: moderate assistance for LE management and trunk management  Sit to Supine: moderate assistance for LE management and trunk management  Transfers  Sit to Stand: moderate assistance with no AD  Gait  Patient ambulated 50 feet with rolling walker and  minimal assistance while walking straight and moderate assistance for turns . Patient demonstrates unsteady gait, flexed posture, and decreased aminata.   Balance  Sitting: contact guard assistance  Standing: minimum assistance    Therapeutic Activities and Exercises:   Patient educated on role of acute care PT and PT POC, safety while in hospital including calling nurse for mobility, and call light usage    AM-PAC 6 CLICK  MOBILITY  Total Score:13    Patient left HOB elevated with all lines intact, call button in reach, RN notified, and hospital sitter and personal caregiver present.    GOALS:   Multidisciplinary Problems       Physical Therapy Goals          Problem: Physical Therapy    Goal Priority Disciplines Outcome Interventions   Physical Therapy Goal     PT, PT/OT     Description: Goals    1.  Patient to be independent with bed mobility.  2.  Patient to transfer bed <> chair with supervision.  3.  Patient to ambulate with a rolling walker and CGA approximately 100 feet.                       History:     Past Medical History:   Diagnosis Date    A-fib     Amblyopia     right eye    Breast cancer 2005    left    Dementia     Diarrhea 10/30/2012    Dry eye syndrome     HTN (hypertension) 08/14/2012    Interval gout 08/14/2012    Keratoconus of right eye     Stroke 06/2016    Dr. Rocha       Past Surgical History:   Procedure Laterality Date    APPENDECTOMY      BARTHOLIN GLAND CYST EXCISION      BREAST LUMPECTOMY Left 2005    CARDIAC CATHETERIZATION      CATARACT EXTRACTION  03/30/2009    right eye    CATARACT EXTRACTION  04/13/2009    left eye    COLONOSCOPY      ESOPHAGOGASTRODUODENOSCOPY      EYE SURGERY      HYSTERECTOMY  10/22/2015    spine cyst surgery      UPPER GASTROINTESTINAL ENDOSCOPY         Time Tracking:     PT Received On: 11/05/24  PT Start Time: 1000  PT Stop Time: 1015  PT Total Time (min): 15 min     Billable Minutes: Evaluation 15    11/5/2024

## 2024-11-05 NOTE — PT/OT/SLP EVAL
Speech Language Pathology Evaluation  Cognitive/Bedside Swallow    Patient Name:  Theresa Hook   MRN:  [id number]  Admitting Diagnosis: Stroke-like symptoms    Recommendations:                  General Recommendations:  Dysphagia therapy  Diet recommendations:  Puree, Thin   Aspiration Precautions: 1 bite/sip at a time, Assistance with meals, Check for pocketing/oral residue, Eliminate distractions, Feed only when awake/alert, and Meds crushed in puree   General Precautions: Standard, pureed diet      Assessment:     Theresa Hook is a 84 y.o. female who was admitted to the ED for stroke like symptoms. Family and caregiver reported that swallowing difficulties began about a week ago in addition to change in cognitive status and R sided weakness. Prior to stroke like symptoms, it was noted that patient ate and drank independently w/o s/s of aspiration. Caregiver reported some coughing with thin liquids while taking medications at home in the past week. It was reported that patient has exhibited decreased interest in eating and drinking. Caregiver reported that patient primarily eats sweet foods at home.     During initial assessment, patient exhibited poor participation in PO trials. Patient would ingest trace amounts and refuse further PO intake. No overt s/s of aspiration noted during initial PO trials. However, due to decreased mentation and weakness, ST placed orders for puree diet and thin liquids.     ST revisited patient room when puree tray was brought with breakfast. Patient was provided with supervision and touch assistance from caregivers during meal. Patient tolerated all PO intake void s/s of aspiration.     ST recommends puree diet with thin liquids at this time (crush medication in pudding). Will continue to follow.     History:     Past Medical History:   Diagnosis Date    A-fib     Amblyopia     right eye    Breast cancer 2005    left    Dementia     Diarrhea 10/30/2012    Dry eye  syndrome     HTN (hypertension) 08/14/2012    Interval gout 08/14/2012    Keratoconus of right eye     Stroke 06/2016    Dr. Rocha       Past Surgical History:   Procedure Laterality Date    APPENDECTOMY      BARTHOLIN GLAND CYST EXCISION      BREAST LUMPECTOMY Left 2005    CARDIAC CATHETERIZATION      CATARACT EXTRACTION  03/30/2009    right eye    CATARACT EXTRACTION  04/13/2009    left eye    COLONOSCOPY      ESOPHAGOGASTRODUODENOSCOPY      EYE SURGERY      HYSTERECTOMY  10/22/2015    spine cyst surgery      UPPER GASTROINTESTINAL ENDOSCOPY         Social History: Patient lives with caregivers that come to her home in [place].    Prior diet: Regular diet, thin liquids.      Subjective   During initial assessment, patient was at [facility] in upright 90 degree position. Patient presented with decreased mentation, global weakness, and was easily agitated. Participation in initial PO trials was noted as poor. Patient refused all presentations after taking either one sip or bite. RN was present and administered crushed medication in pudding. Two caregivers and patient's nephew were present during initial assessment. ST stated that she would come back when patient was eating a meal to better observe swallow functioning.     ST returned when patient was given ordered puree diet with thin liquids. Patient was upright in bed feeding herself with supervision from caregivers. Patient presented as alert with decreased agitation.      Respiratory Status: Room air    Objective:     Cognitive Status:    Arousal/Alertness Inconsistent responses   Attention Selective attention deficit  Orientation Unable to assess       Receptive Language:   Comprehension:      Questions Simple yes/no 60% accuracy  Open ended questions 40% accuracy  Commands  One step 50% accuracy     Pragmatics:    maintenance 20 % accuracy  and topic maintenance 10 % accuracy     Expressive Language:  Verbal:    Conversational speech 20% accuracy       Motor  Speech:  WFL    Voice:   WFL    Visual-Spatial:  WFL    Oral Musculature Evaluation  Oral Musculature: unable to assess due to poor participation/comprehension  Structural Abnormalities: None observed  Dentition: present and adequate    Bedside Swallow Eval:   Consistencies Assessed:  Thin liquids water, apple juice by straw and spoon 1 oz  Puree pudding w/ crushed medication, eggs, sausage 4 oz      Oral Phase:   Thin liquid: Slow oral transit time, bolus holding 1x   Puree: WFL    Pharyngeal Phase:   no overt clinical signs/symptoms of aspiration  Poor hyolaryngeal lift and excursion      Goals:   Multidisciplinary Problems       SLP Goals          Problem: SLP    Goal Priority Disciplines Outcome   SLP Goal     SLP Progressing   Description: Patients will maintain adequate hydration/nutrition with optimum safety and efficiency of swallowing function on P.O. intake without overt signs and symptoms of aspiration for the highest appropriate diet level.                        Plan:     Patient to be seen:  3 x/week   Plan of Care reviewed with:  caregiver, other (see comments) (Nephew)   SLP Follow-Up:  Yes       Discharge recommendations:  Therapy Intensity Recommendations at Discharge: Moderate Intensity Therapy   Due to limits of clinical bedside swallow study and sudden change in status, ST recommends an outpatient Modified Barium Swallow Study to further assess the patient's current swallowing abilities. ST will discuss scheduling this with Case Management.     Barriers to Discharge:  None    Time Tracking:     SLP Treatment Date:   11/05/24  Speech Start Time:  0911  Speech Stop Time:  0930     Speech Total Time (min):  19 min    Billable Minutes: Eval Swallow and Oral Function 19 minutes    11/05/2024

## 2024-11-05 NOTE — ASSESSMENT & PLAN NOTE
S/P angioplasty with stent,    -Patient with known CAD s/p stent placement, which is controlled   -Will continue ASA and Statin and monitor for S/Sx of angina/ACS.   -Continue to monitor on telemetry.

## 2024-11-05 NOTE — PLAN OF CARE
[facility] - Med Surg  Initial Discharge Assessment       Primary Care Provider: Josette Lino MD    Admission Diagnosis: Physical deconditioning [R53.81]  Right knee pain [M25.561]  Acute encephalopathy [G93.40]  Stroke-like symptom [R29.90]    Admission Date: 11/4/2024  Expected Discharge Date: 11/7/2024    DC assessment completed with caregiver at bedside and daughter Liz via phone. . Verified information on facesheet as correct. Pt lives at listed address with 24/7 caregivers. Reports she has help if needed from caregivers. Reports no POA is daughter Liz is NOK.   Dr. Lino is her PCP last apt was last week. Pharmacy is [facility]. Denies hh/hd/outpt services. DME request Wheelchair.  Reports being dependent with activities, family hires 24/7 caregivers to stay with patient. They drive her to apts.and will provide transportation home upon DC.   Reports taking home medications as prescribed and can currently afford them uses [facility], nephew is pharmacist there.      Verified insurance on file. Denies recent inpt stay in last 30 days.  DC plan is home with caretaker and Home Health. Daughter Liz gave permission to speak with nephew Steven Yanez about HH preferences.   daughter states pt went to [facility] rehab last year and didn't comply with her treatment plan.       Transition of Care Barriers: None    Payor: [employer] MANAGED MEDICARE / Plan: [employer] MEDICARE PPO / Product Type: Medicare Advantage /     Extended Emergency Contact Information  Primary Emergency Contact: Chelsie Yanezy  Address: [address] [place] [place] [address] [place]  Mobile Phone: [phone]  Relation: Relative  Secondary Emergency Contact: LIZ FREEMAN  Mobile Phone: [phone]  Relation: Daughter   needed? No    Discharge Plan A: Home Health  Discharge Plan B: Home with family      [facility] - [place], [place] - [address]  [address]  [place] [address]  Phone: [phone] Fax: [phone]    [facility] [place] - [address]  [address]  [place] [address]  Phone: [phone] Fax: [phone]      Initial Assessment (most recent)       Adult Discharge Assessment - 11/05/24 1500          Discharge Assessment    Assessment Type Discharge Planning Assessment     Confirmed/corrected address, phone number and insurance Yes     Confirmed Demographics Correct on Facesheet     Source of Information family     If unable to respond/provide information was family/caregiver contacted? Yes     Contact Name/Number [phone] Liz(Daughter)     When was your last doctors appointment? --   last week    Does patient/caregiver understand observation status Yes     Communicated AMY with patient/caregiver Yes     Reason For Admission stroke like symptoms     People in Home other relative(s)   24/7 caregiver team    Do you expect to return to your current living situation? Yes     Do you have help at home or someone to help you manage your care at home? Yes     Who are your caregiver(s) and their phone number(s)? private care givers hired by family.     Prior to hospitilization cognitive status: Alert/Oriented     Current cognitive status: Unable to Assess;Coma/Sedated/Intubated     Walking or Climbing Stairs Difficulty yes     Walking or Climbing Stairs ambulation difficulty, requires equipment     Mobility Management Pt has walker she uses at home     Dressing/Bathing Difficulty yes     Dressing/Bathing bathing difficulty, assistance 1 person     Dressing/Bathing Management Care giver assit with bathing, dressing, meals     Do you have any problems with: Needs other help     Specify other help 24/7 caregivers to assist with all ADLs     Home Accessibility wheelchair accessible     Home Layout Able to live on 1st floor     Equipment Currently Used at Home rollator     Readmission within 30 days? No     Patient  currently being followed by outpatient case management? No     Do you currently have service(s) that help you manage your care at home? Yes     How Many hours does patient receive services 24     Name and Contact number of agency family hires private individual sitters.     Is the pt/caregiver preference to resume services with current agency Yes     Do you take prescription medications? Yes     Do you have prescription coverage? Yes     Coverage [employer]     Do you have any problems affording any of your prescribed medications? No     Is the patient taking medications as prescribed? yes     Who is going to help you get home at discharge? Caretaker     How do you get to doctors appointments? family or friend will provide     Are you on dialysis? No     Do you take coumadin? No     Discharge Plan A Home Health     Discharge Plan B Home with family     DME Needed Upon Discharge  other (see comments)   TBD    Discharge Plan discussed with: Adult children     Name(s) and Number(s) Liz [phone]     Transition of Care Barriers None

## 2024-11-06 LAB
ALBUMIN SERPL BCP-MCNC: 3.8 G/DL (ref 3.5–5.2)
ALLENS TEST: ABNORMAL
ALP SERPL-CCNC: 85 U/L (ref 40–150)
ALT SERPL W/O P-5'-P-CCNC: 21 U/L (ref 10–44)
ANION GAP SERPL CALC-SCNC: 18 MMOL/L (ref 8–16)
AST SERPL-CCNC: 32 U/L (ref 10–40)
BASOPHILS # BLD AUTO: 0.07 K/UL (ref 0–0.2)
BASOPHILS NFR BLD: 0.6 % (ref 0–1.9)
BILIRUB SERPL-MCNC: 0.7 MG/DL (ref 0.1–1)
BUN SERPL-MCNC: 13 MG/DL (ref 8–23)
CALCIUM SERPL-MCNC: 9.7 MG/DL (ref 8.7–10.5)
CHLORIDE SERPL-SCNC: 105 MMOL/L (ref 95–110)
CO2 SERPL-SCNC: 18 MMOL/L (ref 23–29)
CREAT SERPL-MCNC: 0.7 MG/DL (ref 0.5–1.4)
DELSYS: ABNORMAL
DIFFERENTIAL METHOD BLD: ABNORMAL
EOSINOPHIL # BLD AUTO: 0.1 K/UL (ref 0–0.5)
EOSINOPHIL NFR BLD: 0.5 % (ref 0–8)
ERYTHROCYTE [DISTWIDTH] IN BLOOD BY AUTOMATED COUNT: 13.7 % (ref 11.5–14.5)
EST. GFR  (NO RACE VARIABLE): >60 ML/MIN/1.73 M^2
FIO2: 21
GLUCOSE SERPL-MCNC: 92 MG/DL (ref 70–110)
HCO3 UR-SCNC: 22.9 MMOL/L (ref 24–28)
HCT VFR BLD AUTO: 46.3 % (ref 37–48.5)
HGB BLD-MCNC: 14.9 G/DL (ref 12–16)
IMM GRANULOCYTES # BLD AUTO: 0.03 K/UL (ref 0–0.04)
IMM GRANULOCYTES NFR BLD AUTO: 0.2 % (ref 0–0.5)
INFLUENZA A, MOLECULAR: NEGATIVE
INFLUENZA B, MOLECULAR: NEGATIVE
LYMPHOCYTES # BLD AUTO: 1.5 K/UL (ref 1–4.8)
LYMPHOCYTES NFR BLD: 12.2 % (ref 18–48)
MCH RBC QN AUTO: 30.4 PG (ref 27–31)
MCHC RBC AUTO-ENTMCNC: 32.2 G/DL (ref 32–36)
MCV RBC AUTO: 95 FL (ref 82–98)
MODE: ABNORMAL
MONOCYTES # BLD AUTO: 1.2 K/UL (ref 0.3–1)
MONOCYTES NFR BLD: 9.5 % (ref 4–15)
NEUTROPHILS # BLD AUTO: 9.4 K/UL (ref 1.8–7.7)
NEUTROPHILS NFR BLD: 77 % (ref 38–73)
NRBC BLD-RTO: 0 /100 WBC
PCO2 BLDA: 33.8 MMHG (ref 35–45)
PH SMN: 7.44 [PH] (ref 7.35–7.45)
PLATELET # BLD AUTO: 237 K/UL (ref 150–450)
PMV BLD AUTO: 10.7 FL (ref 9.2–12.9)
PO2 BLDA: 71 MMHG (ref 80–100)
POC BE: -1 MMOL/L
POC SATURATED O2: 95 % (ref 95–100)
POC TCO2: 24 MMOL/L (ref 23–27)
POTASSIUM SERPL-SCNC: 4 MMOL/L (ref 3.5–5.1)
PROT SERPL-MCNC: 7.7 G/DL (ref 6–8.4)
RBC # BLD AUTO: 4.9 M/UL (ref 4–5.4)
SAMPLE: ABNORMAL
SARS-COV-2 RDRP RESP QL NAA+PROBE: NEGATIVE
SITE: ABNORMAL
SODIUM SERPL-SCNC: 141 MMOL/L (ref 136–145)
SPECIMEN SOURCE: NORMAL
WBC # BLD AUTO: 12.26 K/UL (ref 3.9–12.7)

## 2024-11-06 PROCEDURE — 80053 COMPREHEN METABOLIC PANEL: CPT | Performed by: NURSE PRACTITIONER

## 2024-11-06 PROCEDURE — 96372 THER/PROPH/DIAG INJ SC/IM: CPT | Performed by: STUDENT IN AN ORGANIZED HEALTH CARE EDUCATION/TRAINING PROGRAM

## 2024-11-06 PROCEDURE — 85025 COMPLETE CBC W/AUTO DIFF WBC: CPT | Performed by: NURSE PRACTITIONER

## 2024-11-06 PROCEDURE — 99900035 HC TECH TIME PER 15 MIN (STAT)

## 2024-11-06 PROCEDURE — 25000003 PHARM REV CODE 250: Performed by: STUDENT IN AN ORGANIZED HEALTH CARE EDUCATION/TRAINING PROGRAM

## 2024-11-06 PROCEDURE — 87635 SARS-COV-2 COVID-19 AMP PRB: CPT | Performed by: STUDENT IN AN ORGANIZED HEALTH CARE EDUCATION/TRAINING PROGRAM

## 2024-11-06 PROCEDURE — 97165 OT EVAL LOW COMPLEX 30 MIN: CPT

## 2024-11-06 PROCEDURE — G0378 HOSPITAL OBSERVATION PER HR: HCPCS

## 2024-11-06 PROCEDURE — 87502 INFLUENZA DNA AMP PROBE: CPT | Performed by: STUDENT IN AN ORGANIZED HEALTH CARE EDUCATION/TRAINING PROGRAM

## 2024-11-06 PROCEDURE — 36600 WITHDRAWAL OF ARTERIAL BLOOD: CPT

## 2024-11-06 PROCEDURE — 36415 COLL VENOUS BLD VENIPUNCTURE: CPT | Performed by: NURSE PRACTITIONER

## 2024-11-06 PROCEDURE — 99233 SBSQ HOSP IP/OBS HIGH 50: CPT | Mod: ,,, | Performed by: STUDENT IN AN ORGANIZED HEALTH CARE EDUCATION/TRAINING PROGRAM

## 2024-11-06 PROCEDURE — 63600175 PHARM REV CODE 636 W HCPCS: Mod: JZ,JG | Performed by: STUDENT IN AN ORGANIZED HEALTH CARE EDUCATION/TRAINING PROGRAM

## 2024-11-06 PROCEDURE — 94761 N-INVAS EAR/PLS OXIMETRY MLT: CPT

## 2024-11-06 PROCEDURE — 82803 BLOOD GASES ANY COMBINATION: CPT

## 2024-11-06 PROCEDURE — 97116 GAIT TRAINING THERAPY: CPT

## 2024-11-06 PROCEDURE — 25000003 PHARM REV CODE 250: Performed by: NURSE PRACTITIONER

## 2024-11-06 RX ORDER — CEFTRIAXONE 1 G/1
1 INJECTION, POWDER, FOR SOLUTION INTRAMUSCULAR; INTRAVENOUS
Status: DISCONTINUED | OUTPATIENT
Start: 2024-11-06 | End: 2024-11-06

## 2024-11-06 RX ORDER — QUETIAPINE FUMARATE 25 MG/1
25 TABLET, FILM COATED ORAL NIGHTLY
Status: DISCONTINUED | OUTPATIENT
Start: 2024-11-06 | End: 2024-11-07

## 2024-11-06 RX ADMIN — ATORVASTATIN CALCIUM 40 MG: 40 TABLET, FILM COATED ORAL at 10:11

## 2024-11-06 RX ADMIN — PANTOPRAZOLE SODIUM 40 MG: 40 TABLET, DELAYED RELEASE ORAL at 10:11

## 2024-11-06 RX ADMIN — APIXABAN 2.5 MG: 2.5 TABLET, FILM COATED ORAL at 10:11

## 2024-11-06 RX ADMIN — AMIODARONE HYDROCHLORIDE 200 MG: 100 TABLET ORAL at 10:11

## 2024-11-06 RX ADMIN — OLANZAPINE 5 MG: 10 INJECTION, POWDER, FOR SOLUTION INTRAMUSCULAR at 06:11

## 2024-11-06 RX ADMIN — MEMANTINE HYDROCHLORIDE 5 MG: 5 TABLET ORAL at 10:11

## 2024-11-06 RX ADMIN — QUETIAPINE FUMARATE 25 MG: 25 TABLET ORAL at 08:11

## 2024-11-06 RX ADMIN — APIXABAN 2.5 MG: 2.5 TABLET, FILM COATED ORAL at 08:11

## 2024-11-06 NOTE — PROGRESS NOTES
[facility] Medicine  Progress Note    Patient Name: Theresa Hook  MRN: [id number]  Patient Class: OP- Observation   Admission Date: 2024  Length of Stay: 0 days  Attending Physician: Tomy Cage MD  Primary Care Provider: Jsoette Lino MD        Subjective:     Principal Problem:Moderate dementia, unspecified dementia type, unspecified whether behavioral, psychotic, or mood disturbance or anxiety        HPI:  Theresa Hook is an 84-year-old female with a PMHx of HTN, gout, [place]. Dementia, PAF on eliquis, stroke, frequent UTI who presented to the ED for the evaluation of possible UTI. Family at bedside report about Thursday or Friday was last normal self. Has dementia but nothing out of the ordinary. Says Saturday is when she was walking around without clothes on, wanting to go places. Says her urine has a foul odor and can smell it in the other room. Positive for pain on urination. Last few days they have been breaking up pills due to her trouble swallowing. Says she has a rolling walker and lately she has not been able to ambulate and looks fatigue. Says he has trouble lifting up the right leg and cannot lifting right arm. Says she been having trouble with her right shoulder and arm for the past 2 months. Says she has always had issues with her foot and went to a specialist and they said the ligament is almost about. Says they give her water constantly, she drinks the body armor drinks and sugar free root bear, ensure. She will only really eat breakfast. Says yesterday they attempted to take her to urgent care but she refused to get out of the car. Says she recently was placed on tramadol once a day for pain about 3 weeks ago. They report she's been hallucinating seeing  family member. Denies any fevers, chills, abdominal pain, changes in bowel or bladder frequency.    In the ED, labs pretty unremarkable. CTH: No CT evidence of acute intracranial  abnormality. Clinical correlation and further evaluation as warranted. Chronic senescent and microvascular ischemic changes. Right knee XRY: Mild tricompartmental degenerative osteoarthrosis with small osteophyte formation. Joint spaces are preserved. Normal alignment. No significant suprapatellar effusion. Per ED provider, patient needing admission for stroke workup and deconditioning and MRI. Will admit for further monitoring and treatment.    Overview/Hospital Course:  No notes on file    Interval History: MRI negative for CVA. 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 Nothing acute. COVID and flu negative. ABG with CO2 wnl. UA not consistent with UTI. CXR with RLL atelectasis. No signs of infection. Symptoms consistent with advanced dementia with behavior disturbances.     Spoke with daughter today who states that patient has precipitously declined over past 3 months but over the last 2 weeks patient has been progressively confused and having issues with sundowning at night.      I explained to daughter this might be progression of her dementia since we are not finding any reversible causes such as infection, stroke, hypercapnia. She is understanding of this and is interested in home with hospice.    Review of Systems   All other systems reviewed and are negative.    Objective:     Vital Signs (Most Recent):  Temp: 97.7 °F (36.5 °C) (11/06/24 1541)  Pulse: 76 (11/06/24 1541)  Resp: 18 (11/06/24 1541)  BP: 139/88 (11/06/24 1541)  SpO2: 95 % (11/06/24 1541) Vital Signs (24h Range):  Temp:  [97.7 °F (36.5 °C)-98.7 °F (37.1 °C)] 97.7 °F (36.5 °C)  Pulse:  [65-76] 76  Resp:  [18-19] 18  SpO2:  [95 %-98 %] 95 %  BP: (139-189)/(82-92) 139/88     Weight: 68.5 kg (151 lb)  Body mass index is 23.65 kg/m².    Intake/Output Summary (Last 24 hours) at 11/6/2024 1633  Last data filed at 11/6/2024 0500  Gross per 24 hour   Intake --   Output 1050 ml   Net -1050 ml         Physical Exam      Vitals reviewed  General:  NAD, elderly, frail appearing  Head: NC/AT  Eyes: EOMI  Cardiovascular: RRR  Pulmonary: Normal Respiratory Rate, No respiratory distress  Gi: Soft, Non-tender  Extremities: Warm, No edema present  Skin: Warm, dry  Neuro: somnolent but arouses easily, Oriented x1, No focal Deficit appreciated but poor effort on exam.   Psych: Appropriate mood and affect      Significant Labs: All pertinent labs within the past 24 hours have been reviewed.    Significant Imaging: I have reviewed all pertinent imaging results/findings within the past 24 hours.    Assessment/Plan:      * Moderate dementia, unspecified dementia type, unspecified whether behavioral, psychotic, or mood disturbance or anxiety  -Patient currently AAOx1; I think her current symptoms very likely represent a worsening of her known dementia.   -have primarily ruled out reversible causes of her current condition.   -Resume home meds  -Continue non-pharmacologic interventions to prevent delirium , activity during day, opening blinds, providing glasses/hearing aids, and up in chair during daytime).   -Will use PRN anti-psychotics to prevent behavior of self harm during sundowning, and avoid narcotics and benzos unless absolutely necessary.   -Will provide PRN anti-psychotics to avoid self harm behaviors if necessary     Stroke-like symptoms  - History of prior stroke; Right-sided weakness increased  - start Atorvastatin 40mg daily  - Resume home eliquis  - Labs: A1c, TSH, B12, Folate, Lipid Panel-completed  - MRI negative  - Cardiac imaging: TTE   - PT/OT eval  - restart meds as needed  - -180 for now    Gastroesophageal reflux disease  -Home omeprazole not on hospital formulary-ordered protonix    Coronary artery disease of native artery of native heart with stable angina pectoris  S/P angioplasty with stent,    -Patient with known CAD s/p stent placement, which is controlled   -Will continue  eliquis and Statin and monitor for S/Sx of angina/ACS.    -Continue to monitor on telemetry.       Gait instability  Right knee Xray: Mild tricompartmental degenerative osteoarthrosis with small osteophyte formation.  Joint spaces are preserved.  Normal alignment.  No significant suprapatellar effusion.  -fall precautions  -PT/OT evaluation    Paroxysmal atrial fibrillation  Patient has paroxysmal (<7 days) atrial fibrillation. Patient is currently in sinus rhythm. [id number] Score: 4. The patients heart rate in the last 24 hours is as follows:  Pulse  Min: 60  Max: 65     Antiarrhythmics  amiodarone tablet 200 mg, Daily, Oral    Anticoagulants  apixaban tablet 2.5 mg, 2 times daily, Oral    Plan  - Replete lytes with a goal of K>4, Mg >2  - Patient is anticoagulated, see medications listed above.  - Patient's afib is currently controlled    HTN (hypertension)  Chronic, Latest blood pressure and vitals reviewed-   Temp:  [97.9 °F (36.6 °C)]   Pulse:  [60-65]   Resp:  [16-20]   BP: (134-172)/(64-80)   SpO2:  [95 %-97 %] .   Home meds for hypertension were reviewed and noted below.   Hypertension Medications               furosemide (LASIX) 20 MG tablet Take 20 mg by mouth once daily.     -Per family-takes lasix as needed for swelling  -Monitor and trend vital signs q4hr  -Will utilize p.r.n. blood pressure medication only if patient's blood pressure greater than  180/110 and she develops symptoms such as worsening chest pain or shortness of breath.        VTE Risk Mitigation (From admission, onward)           Ordered     apixaban tablet 2.5 mg  2 times daily         11/04/24 2019     Reason for No Pharmacological VTE Prophylaxis  Once        Question:  Reasons:  Answer:  Already adequately anticoagulated on oral Anticoagulants    11/04/24 2015     IP VTE HIGH RISK PATIENT  Once         11/04/24 2015     Place sequential compression device  Until discontinued         11/04/24 2015                    Discharge Planning   AMY: 11/7/2024     Code Status: Full Code   Is the  patient medically ready for discharge?:     Reason for patient still in hospital (select all that apply): Treatment  Discharge Plan A: Home Health                  Tomy Cage MD  Department of Hospital Medicine   [facility]

## 2024-11-06 NOTE — PT/OT/SLP EVAL
Occupational Therapy Evaluation     Name: Theresa Hook  MRN: [id number]  Admitting Diagnosis: Stroke-like symptoms  Recent Surgery: * No surgery found *      Recommendations:     Discharge Recommendations: Moderate Intensity Therapy  Level of Assistance Recommended: 24 hours significant assistance  Discharge Equipment Recommendations: wheelchair  Barriers to discharge: None    Assessment:     Theresa Hook is a 84 y.o. female with a medical diagnosis of Stroke-like symptoms. She presents with performance deficits affecting function including weakness, impaired endurance, impaired functional mobility, impaired self care skills, impaired cognition.     Rehab Prognosis: Fair; patient would benefit from acute OT services to address these deficits and reach maximum level of function.    Plan:     Patient to be seen 2 x/week to address the above listed problems via self-care/home management, therapeutic exercises, therapeutic activities, neuromuscular re-education  Plan of Care Expires: 12/04/24  Plan of Care Reviewed with: patient, caregiver    Subjective     Chief Complaint: Patient difficult to arouse after sedation meds from last night.   Patient Comments/Goals: none stated from patient  Pain/Comfort:  Pain Rating 1: 0/10    Patients cultural, spiritual, Islam conflicts given the current situation: no    Social History: Patient caregiver present during session and provides history as patient very lethargic and difficult to arouse. Patient does however follow commands and answer some questions.   Living Environment: Patient  lives alone but has 24/7 care   in  [facility]  with number of outside stair(s): 0 steps   Prior Level of Function: Prior to admission, patient  was reportedly independent with ADLs using a rollator for mobility   Roles and Routines: Patient was not driving and not working prior to admission.  Equipment Used at Home: rollator, grab bar, bath bench, raised toilet  DME owned (not  currently used): none  Assistance Upon Discharge:  caregivers     Objective:     Communicated with sitter prior to session. Patient found supine with irvin catheter, peripheral IV upon OT entry to room.    General Precautions: Standard,     Orthopedic Precautions:     Braces:      Respiratory Status: Room air    Occupational Performance    Gait belt applied - No    Unable to assess patient mobility or ADLs due to lethargy remaining from last night.     Cognitive/Visual Perceptual:  Cognitive/Psychosocial Skills:    -     Oriented to: Person    Physical Exam:  Upper Extremity Strength:    -       Right Upper Extremity: Deficits: 3/5 Difficult to MMT but patient was able to follow command to resist  -       Left Upper Extremity: Deficits: 3/5 Difficult to MMT but patient was able to follow command to resist   Strength:    -       Right Upper Extremity: Deficits: 3/5  -       Left Upper Extremity: Deficits: 3/5    AMPAC 6 Click ADL:  AMPAC Total Score: 12    Treatment & Education:  Therapist provided facilitation and instruction of proper body mechanics, energy conservation, and fall prevention strategies during tasks listed above  Patient educated on role of OT, POC, and goals for therapy  Patient educated on importance of OOB activities with staff member assistance and sitting OOB majority of the day      Patient left HOB elevated with all lines intact, call button in reach, and family and sitter present.    GOALS:   Pt to increase OOB tolerance to 30 min per day seated in upright chair by d.c.  Pt to increase LB dressing IND to set up/ SBA for donning socks and pants by d.c  Pt to increase IND with stand pivot transfer in order to t/f to and from BSC by d/c.   Pt to complete toileting with SBA for all components (LB drsg, pericare and stand pivot) by d/c.         History:     Past Medical History:   Diagnosis Date    A-fib     Amblyopia     right eye    Breast cancer 2005    left    Dementia     Diarrhea  10/30/2012    Dry eye syndrome     HTN (hypertension) 08/14/2012    Interval gout 08/14/2012    Keratoconus of right eye     Stroke 06/2016    Dr. Rocha         Past Surgical History:   Procedure Laterality Date    APPENDECTOMY      BARTHOLIN GLAND CYST EXCISION      BREAST LUMPECTOMY Left 2005    CARDIAC CATHETERIZATION      CATARACT EXTRACTION  03/30/2009    right eye    CATARACT EXTRACTION  04/13/2009    left eye    COLONOSCOPY      ESOPHAGOGASTRODUODENOSCOPY      EYE SURGERY      HYSTERECTOMY  10/22/2015    spine cyst surgery      UPPER GASTROINTESTINAL ENDOSCOPY         Time Tracking:     OT Date of Treatment:    OT Start Time: 0820  OT Stop Time: 0830  OT Total Time (min): 10 min    Billable Minutes: Evaluation 10 minutes    11/6/2024

## 2024-11-06 NOTE — PLAN OF CARE
Problem: Stroke, Ischemic (Includes Transient Ischemic Attack)  Goal: Optimal Coping  Outcome: Progressing  Goal: Effective Bowel Elimination  Outcome: Progressing  Goal: Optimal Cerebral Tissue Perfusion  Outcome: Progressing  Goal: Optimal Cognitive Function  Outcome: Progressing  Goal: Improved Communication Skills  Outcome: Progressing  Goal: Optimal Functional Ability  Outcome: Progressing  Goal: Optimal Nutrition Intake  Outcome: Progressing  Goal: Effective Oxygenation and Ventilation  Outcome: Progressing  Goal: Improved Sensorimotor Function  Outcome: Progressing  Goal: Safe and Effective Swallow  Outcome: Progressing  Goal: Effective Urinary Elimination  Outcome: Progressing     Problem: Fall Injury Risk  Goal: Absence of Fall and Fall-Related Injury  Outcome: Progressing     Problem: Adult Inpatient Plan of Care  Goal: Plan of Care Review  Outcome: Progressing  Goal: Patient-Specific Goal (Individualized)  Outcome: Progressing  Goal: Absence of Hospital-Acquired Illness or Injury  Outcome: Progressing  Goal: Optimal Comfort and Wellbeing  Outcome: Progressing  Goal: Readiness for Transition of Care  Outcome: Progressing     Problem: Infection  Goal: Absence of Infection Signs and Symptoms  Outcome: Progressing     Problem: Skin Injury Risk Increased  Goal: Skin Health and Integrity  Outcome: Progressing   Pt VSS. Purposeful rounding complete. All questions answered. Call light within reach. Bed in locked and low position.

## 2024-11-06 NOTE — PROGRESS NOTES
Physical therapy was attempted at 11:00 but the patient was too lethargic to participate.  Will attempt therapy again this afternoon.

## 2024-11-06 NOTE — NURSING
Pt agitation upon shift change. Dr notified.   2038: Zyprexa 5mg IM given with no effect.  2145: Quetipine attempted but patient grabbed spoon and threw it. Said she would not take anything.   2200: Haldol 2mg given IV. Patient had to be restrained in order to give med. Patient calmed a little.  2210: Bladder scan showed 500mL urine retained. Jarrell ordered. 750 mL urine returned.

## 2024-11-06 NOTE — ASSESSMENT & PLAN NOTE
-Patient currently AAOx1; I think her current symptoms very likely represent a worsening of her known dementia.   -have primarily ruled out reversible causes of her current condition.   -Resume home meds  -Continue non-pharmacologic interventions to prevent delirium , activity during day, opening blinds, providing glasses/hearing aids, and up in chair during daytime).   -Will use PRN anti-psychotics to prevent behavior of self harm during sundowning, and avoid narcotics and benzos unless absolutely necessary.   -Will provide PRN anti-psychotics to avoid self harm behaviors if necessary

## 2024-11-06 NOTE — PT/OT/SLP PROGRESS
Physical Therapy Treatment    Patient Name:  Theresa Hook   MRN:  [id number]    Recommendations:     Discharge Recommendations: Moderate Intensity Therapy (SNF)  Discharge Equipment Recommendations: wheelchair  Barriers to discharge:  Increased level of assist, ongoing medical treatment    Assessment:     Theresa Hook is a 84 y.o. female admitted with a medical diagnosis of Stroke-like symptoms.  She presents with the following impairments/functional limitations: weakness, impaired functional mobility, gait instability, impaired balance, impaired cognition, decreased safety awareness.    Rehab Prognosis: Good; patient would benefit from acute skilled PT services to address these deficits and reach maximum level of function.    Recent Surgery: * No surgery found *      Plan:     During this hospitalization, patient to be seen 5 x/week to address the identified rehab impairments via gait training, therapeutic activities, therapeutic exercises and progress toward the following goals:    Plan of Care Expires:   (Upon hospital discharge)    Subjective     Chief Complaint: The patient did not have any complaints.  She agreed to participate in therapy with encouragement from her caregiver.    Patient/Family Comments/goals: None stated  Pain/Comfort:  Pain Rating 1: 0/10      Objective:     Communicated with nurse prior to session.  Patient found sitting edge of bed with telemetry, peripheral IV (sitter) upon PT entry to room.     General Precautions: Standard, fall  Orthopedic Precautions: N/A  Braces: N/A  Respiratory Status: Room air     Treatment & Education:  The patient's caregiver brought the patient's rollator from home.  The patient received sit to stand transfer training from the bed to the rollator with min assist.  She received gait training approximately 100 feet with rolling walker and minimal assistance while walking straight and moderate assistance for turns . Patient demonstrates unsteady gait,  flexed posture, and decreased aminata.     Patient left sitting edge of bed with all lines intact, call button in reach, nurse notified, and sitter and caregiver present..    GOALS:   Multidisciplinary Problems       Physical Therapy Goals          Problem: Physical Therapy    Goal Priority Disciplines Outcome Interventions   Physical Therapy Goal     PT, PT/OT     Description: Goals    1.  Patient to be independent with bed mobility.  2.  Patient to transfer bed <> chair with supervision.  3.  Patient to ambulate with a rolling walker and CGA approximately 100 feet.                       Time Tracking:     PT Received On: 11/06/24  PT Start Time: 1400     PT Stop Time: 1420  PT Total Time (min): 20 min     Billable Minutes: Gait Training 20    Treatment Type: Treatment  PT/PTA: PT     Number of PTA visits since last PT visit: 0     11/06/2024

## 2024-11-06 NOTE — SUBJECTIVE & OBJECTIVE
Interval History: MRI negative for CVA. 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 Nothing acute. COVID and flu negative. ABG with CO2 wnl. UA not consistent with UTI. CXR with RLL atelectasis. No signs of infection. Symptoms consistent with advanced dementia with behavior disturbances.     Spoke with daughter today who states that patient has precipitously declined over past 3 months but over the last 2 weeks patient has been progressively confused and having issues with sundowning at night.      I explained to daughter this might be progression of her dementia since we are not finding any reversible causes such as infection, stroke, hypercapnia. She is understanding of this and is interested in home with hospice.    Review of Systems   All other systems reviewed and are negative.    Objective:     Vital Signs (Most Recent):  Temp: 97.7 °F (36.5 °C) (11/06/24 1541)  Pulse: 76 (11/06/24 1541)  Resp: 18 (11/06/24 1541)  BP: 139/88 (11/06/24 1541)  SpO2: 95 % (11/06/24 1541) Vital Signs (24h Range):  Temp:  [97.7 °F (36.5 °C)-98.7 °F (37.1 °C)] 97.7 °F (36.5 °C)  Pulse:  [65-76] 76  Resp:  [18-19] 18  SpO2:  [95 %-98 %] 95 %  BP: (139-189)/(82-92) 139/88     Weight: 68.5 kg (151 lb)  Body mass index is 23.65 kg/m².    Intake/Output Summary (Last 24 hours) at 11/6/2024 1633  Last data filed at 11/6/2024 0500  Gross per 24 hour   Intake --   Output 1050 ml   Net -1050 ml         Physical Exam      Vitals reviewed  General: NAD, elderly, frail appearing  Head: NC/AT  Eyes: EOMI  Cardiovascular: RRR  Pulmonary: Normal Respiratory Rate, No respiratory distress  Gi: Soft, Non-tender  Extremities: Warm, No edema present  Skin: Warm, dry  Neuro: somnolent but arouses easily, Oriented x1, No focal Deficit appreciated but poor effort on exam.   Psych: Appropriate mood and affect      Significant Labs: All pertinent labs within the past 24 hours have been reviewed.    Significant Imaging: I have reviewed all pertinent  imaging results/findings within the past 24 hours.   40w

## 2024-11-06 NOTE — PLAN OF CARE
Problem: Stroke, Ischemic (Includes Transient Ischemic Attack)  Goal: Optimal Coping  Outcome: Progressing  Goal: Effective Bowel Elimination  Outcome: Progressing  Goal: Optimal Cerebral Tissue Perfusion  Outcome: Progressing  Goal: Optimal Cognitive Function  Outcome: Progressing  Goal: Improved Communication Skills  Outcome: Progressing  Goal: Optimal Functional Ability  Outcome: Progressing  Goal: Optimal Nutrition Intake  Outcome: Progressing  Goal: Effective Oxygenation and Ventilation  Outcome: Progressing  Goal: Improved Sensorimotor Function  Outcome: Progressing  Goal: Safe and Effective Swallow  Outcome: Progressing  Goal: Effective Urinary Elimination  Outcome: Progressing     Problem: Fall Injury Risk  Goal: Absence of Fall and Fall-Related Injury  Outcome: Progressing     Problem: Adult Inpatient Plan of Care  Goal: Plan of Care Review  Outcome: Progressing     Problem: Adult Inpatient Plan of Care  Goal: Plan of Care Review  Outcome: Progressing  Goal: Patient-Specific Goal (Individualized)  Outcome: Progressing  Goal: Absence of Hospital-Acquired Illness or Injury  Outcome: Progressing  Goal: Optimal Comfort and Wellbeing  Outcome: Progressing  Goal: Readiness for Transition of Care  Outcome: Progressing     Problem: Infection  Goal: Absence of Infection Signs and Symptoms  Outcome: Progressing     Problem: Skin Injury Risk Increased  Goal: Skin Health and Integrity  Outcome: Progressing

## 2024-11-06 NOTE — ASSESSMENT & PLAN NOTE
- History of prior stroke; Right-sided weakness increased  - start Atorvastatin 40mg daily  - Resume home eliquis  - Labs: A1c, TSH, B12, Folate, Lipid Panel-completed  - MRI negative  - Cardiac imaging: TTE   - PT/OT eval  - restart meds as needed  - -180 for now

## 2024-11-07 PROCEDURE — 96372 THER/PROPH/DIAG INJ SC/IM: CPT | Performed by: STUDENT IN AN ORGANIZED HEALTH CARE EDUCATION/TRAINING PROGRAM

## 2024-11-07 PROCEDURE — G0378 HOSPITAL OBSERVATION PER HR: HCPCS

## 2024-11-07 PROCEDURE — 25000003 PHARM REV CODE 250: Performed by: NURSE PRACTITIONER

## 2024-11-07 PROCEDURE — 63600175 PHARM REV CODE 636 W HCPCS: Mod: JZ,JG | Performed by: STUDENT IN AN ORGANIZED HEALTH CARE EDUCATION/TRAINING PROGRAM

## 2024-11-07 PROCEDURE — 25000003 PHARM REV CODE 250: Performed by: STUDENT IN AN ORGANIZED HEALTH CARE EDUCATION/TRAINING PROGRAM

## 2024-11-07 PROCEDURE — 92526 ORAL FUNCTION THERAPY: CPT

## 2024-11-07 PROCEDURE — 99233 SBSQ HOSP IP/OBS HIGH 50: CPT | Mod: ,,, | Performed by: STUDENT IN AN ORGANIZED HEALTH CARE EDUCATION/TRAINING PROGRAM

## 2024-11-07 PROCEDURE — 94761 N-INVAS EAR/PLS OXIMETRY MLT: CPT

## 2024-11-07 RX ORDER — QUETIAPINE FUMARATE 25 MG/1
25 TABLET, FILM COATED ORAL NIGHTLY
Status: DISCONTINUED | OUTPATIENT
Start: 2024-11-07 | End: 2024-11-08 | Stop reason: HOSPADM

## 2024-11-07 RX ORDER — CLOTRIMAZOLE AND BETAMETHASONE DIPROPIONATE 10; .64 MG/G; MG/G
CREAM TOPICAL 2 TIMES DAILY
Status: DISCONTINUED | OUTPATIENT
Start: 2024-11-07 | End: 2024-11-08 | Stop reason: HOSPADM

## 2024-11-07 RX ADMIN — OLANZAPINE 5 MG: 10 INJECTION, POWDER, FOR SOLUTION INTRAMUSCULAR at 10:11

## 2024-11-07 RX ADMIN — ATORVASTATIN CALCIUM 40 MG: 40 TABLET, FILM COATED ORAL at 10:11

## 2024-11-07 RX ADMIN — MEMANTINE HYDROCHLORIDE 5 MG: 5 TABLET ORAL at 10:11

## 2024-11-07 RX ADMIN — QUETIAPINE FUMARATE 25 MG: 25 TABLET ORAL at 07:11

## 2024-11-07 RX ADMIN — CLOTRIMAZOLE AND BETAMETHASONE DIPROPIONATE: 10; .5 CREAM TOPICAL at 07:11

## 2024-11-07 RX ADMIN — APIXABAN 2.5 MG: 2.5 TABLET, FILM COATED ORAL at 07:11

## 2024-11-07 RX ADMIN — AMIODARONE HYDROCHLORIDE 200 MG: 100 TABLET ORAL at 10:11

## 2024-11-07 RX ADMIN — APIXABAN 2.5 MG: 2.5 TABLET, FILM COATED ORAL at 10:11

## 2024-11-07 RX ADMIN — PANTOPRAZOLE SODIUM 40 MG: 40 TABLET, DELAYED RELEASE ORAL at 10:11

## 2024-11-07 NOTE — PLAN OF CARE
called the patients daughter and talked with her about Hospice Preferences since a consult was provided for SW. She chose 1.[facility] and 2. [facility].  explained that the Hospice will contact her and provide further education on care. Patient will discharge home with caregivers and family.   11/07/24 0846   Post-Acute Status   Post-Acute Authorization Hospice   Hospice Status Referrals Sent   Discharge Plan   Discharge Plan B Hospice/home     9:09 [facility] accepted.Awaiting for Discharge Orders for patient.

## 2024-11-07 NOTE — PROGRESS NOTES
[facility] Medicine  Progress Note    Patient Name: Theresa Hook  MRN: [id number]  Patient Class: OP- Observation   Admission Date: 2024  Length of Stay: 0 days  Attending Physician: Tomy Cage MD  Primary Care Provider: Josette Lino MD        Subjective:     Principal Problem:Moderate dementia, unspecified dementia type, unspecified whether behavioral, psychotic, or mood disturbance or anxiety        HPI:  Theresa Hook is an 84-year-old female with a PMHx of HTN, gout, [place]. Dementia, PAF on eliquis, stroke, frequent UTI who presented to the ED for the evaluation of possible UTI. Family at bedside report about Thursday or Friday was last normal self. Has dementia but nothing out of the ordinary. Says Saturday is when she was walking around without clothes on, wanting to go places. Says her urine has a foul odor and can smell it in the other room. Positive for pain on urination. Last few days they have been breaking up pills due to her trouble swallowing. Says she has a rolling walker and lately she has not been able to ambulate and looks fatigue. Says he has trouble lifting up the right leg and cannot lifting right arm. Says she been having trouble with her right shoulder and arm for the past 2 months. Says she has always had issues with her foot and went to a specialist and they said the ligament is almost about. Says they give her water constantly, she drinks the body armor drinks and sugar free root bear, ensure. She will only really eat breakfast. Says yesterday they attempted to take her to urgent care but she refused to get out of the car. Says she recently was placed on tramadol once a day for pain about 3 weeks ago. They report she's been hallucinating seeing  family member. Denies any fevers, chills, abdominal pain, changes in bowel or bladder frequency.    In the ED, labs pretty unremarkable. CTH: No CT evidence of acute intracranial  abnormality. Clinical correlation and further evaluation as warranted. Chronic senescent and microvascular ischemic changes. Right knee XRY: Mild tricompartmental degenerative osteoarthrosis with small osteophyte formation. Joint spaces are preserved. Normal alignment. No significant suprapatellar effusion. Per ED provider, patient needing admission for stroke workup and deconditioning and MRI. Will admit for further monitoring and treatment.    Overview/Hospital Course:  No notes on file    Interval History: NAEON. Patients mental status is improved today. Continue seroquel at night. Patient to be discharged home with hospice tomorrow if she can remain stable    Review of Systems   All other systems reviewed and are negative.    Objective:     Vital Signs (Most Recent):  Temp: 98.7 °F (37.1 °C) (11/07/24 0941)  Pulse: 73 (11/07/24 0941)  Resp: 16 (11/07/24 0941)  BP: 133/73 (11/07/24 0941)  SpO2: 95 % (11/07/24 0941) Vital Signs (24h Range):  Temp:  [97.7 °F (36.5 °C)-98.7 °F (37.1 °C)] 98.7 °F (37.1 °C)  Pulse:  [65-88] 73  Resp:  [16-18] 16  SpO2:  [95 %-97 %] 95 %  BP: (133-168)/(73-92) 133/73     Weight: 68.5 kg (151 lb)  Body mass index is 23.65 kg/m².    Intake/Output Summary (Last 24 hours) at 11/7/2024 1131  Last data filed at 11/7/2024 0556  Gross per 24 hour   Intake 110 ml   Output 600 ml   Net -490 ml         Physical Exam      Vitals reviewed  General: NAD, elderly, frail appearing  Head: NC/AT  Eyes: EOMI  Cardiovascular: RRR  Pulmonary: Normal Respiratory Rate, No respiratory distress  Gi: Soft, Non-tender  Extremities: Warm, No edema present  Skin: Warm, dry  Neuro: awake and alert, Oriented x1, No focal Deficit appreciated but poor effort on exam.   Psych: Appropriate mood and affect      Significant Labs: All pertinent labs within the past 24 hours have been reviewed.    Significant Imaging: I have reviewed all pertinent imaging results/findings within the past 24 hours.    Assessment/Plan:      *  Moderate dementia, unspecified dementia type, unspecified whether behavioral, psychotic, or mood disturbance or anxiety  -Patient currently AAOx1; I think her current symptoms very likely represent a worsening of her known dementia.   -have primarily ruled out reversible causes of her current condition.   -Resume home meds  -Continue non-pharmacologic interventions to prevent delirium , activity during day, opening blinds, providing glasses/hearing aids, and up in chair during daytime).   -Will use PRN anti-psychotics to prevent behavior of self harm during sundowning, and avoid narcotics and benzos unless absolutely necessary.   -Will provide PRN anti-psychotics to avoid self harm behaviors if necessary     Stroke-like symptoms  - History of prior stroke; Right-sided weakness increased  - start Atorvastatin 40mg daily  - Resume home eliquis  - Labs: A1c, TSH, B12, Folate, Lipid Panel-completed  - MRI negative  - Cardiac imaging: TTE   - PT/OT eval  - restart meds as needed  - -180 for now    Gastroesophageal reflux disease  -Home omeprazole not on hospital formulary-ordered protonix    Coronary artery disease of native artery of native heart with stable angina pectoris  S/P angioplasty with stent,    -Patient with known CAD s/p stent placement, which is controlled   -Will continue  eliquis and Statin and monitor for S/Sx of angina/ACS.   -Continue to monitor on telemetry.       Gait instability  Right knee Xray: Mild tricompartmental degenerative osteoarthrosis with small osteophyte formation.  Joint spaces are preserved.  Normal alignment.  No significant suprapatellar effusion.  -fall precautions  -PT/OT evaluation    Paroxysmal atrial fibrillation  Patient has paroxysmal (<7 days) atrial fibrillation. Patient is currently in sinus rhythm. [id number] Score: 4. The patients heart rate in the last 24 hours is as follows:  Pulse  Min: 60  Max: 65     Antiarrhythmics  amiodarone tablet 200 mg, Daily,  Oral    Anticoagulants  apixaban tablet 2.5 mg, 2 times daily, Oral    Plan  - Replete lytes with a goal of K>4, Mg >2  - Patient is anticoagulated, see medications listed above.  - Patient's afib is currently controlled    HTN (hypertension)  Chronic, Latest blood pressure and vitals reviewed-   Temp:  [97.9 °F (36.6 °C)]   Pulse:  [60-65]   Resp:  [16-20]   BP: (134-172)/(64-80)   SpO2:  [95 %-97 %] .   Home meds for hypertension were reviewed and noted below.   Hypertension Medications               furosemide (LASIX) 20 MG tablet Take 20 mg by mouth once daily.     -Per family-takes lasix as needed for swelling  -Monitor and trend vital signs q4hr  -Will utilize p.r.n. blood pressure medication only if patient's blood pressure greater than  180/110 and she develops symptoms such as worsening chest pain or shortness of breath.        VTE Risk Mitigation (From admission, onward)           Ordered     apixaban tablet 2.5 mg  2 times daily         11/04/24 2019     Reason for No Pharmacological VTE Prophylaxis  Once        Question:  Reasons:  Answer:  Already adequately anticoagulated on oral Anticoagulants    11/04/24 2015     IP VTE HIGH RISK PATIENT  Once         11/04/24 2015     Place sequential compression device  Until discontinued         11/04/24 2015                    Discharge Planning   AMY: 11/8/2024     Code Status: Full Code   Is the patient medically ready for discharge?:     Reason for patient still in hospital (select all that apply): Treatment  Discharge Plan A: Hospice/home   Discharge Delays: None known at this time              Tomy Cage MD  Department of Hospital Medicine   [facility]

## 2024-11-07 NOTE — SUBJECTIVE & OBJECTIVE
Interval History: NAEON. Patients mental status is improved today. Continue seroquel at night. Patient to be discharged home with hospice tomorrow if she can remain stable    Review of Systems   All other systems reviewed and are negative.    Objective:     Vital Signs (Most Recent):  Temp: 98.7 °F (37.1 °C) (11/07/24 0941)  Pulse: 73 (11/07/24 0941)  Resp: 16 (11/07/24 0941)  BP: 133/73 (11/07/24 0941)  SpO2: 95 % (11/07/24 0941) Vital Signs (24h Range):  Temp:  [97.7 °F (36.5 °C)-98.7 °F (37.1 °C)] 98.7 °F (37.1 °C)  Pulse:  [65-88] 73  Resp:  [16-18] 16  SpO2:  [95 %-97 %] 95 %  BP: (133-168)/(73-92) 133/73     Weight: 68.5 kg (151 lb)  Body mass index is 23.65 kg/m².    Intake/Output Summary (Last 24 hours) at 11/7/2024 1131  Last data filed at 11/7/2024 0556  Gross per 24 hour   Intake 110 ml   Output 600 ml   Net -490 ml         Physical Exam      Vitals reviewed  General: NAD, elderly, frail appearing  Head: NC/AT  Eyes: EOMI  Cardiovascular: RRR  Pulmonary: Normal Respiratory Rate, No respiratory distress  Gi: Soft, Non-tender  Extremities: Warm, No edema present  Skin: Warm, dry  Neuro: awake and alert, Oriented x1, No focal Deficit appreciated but poor effort on exam.   Psych: Appropriate mood and affect      Significant Labs: All pertinent labs within the past 24 hours have been reviewed.    Significant Imaging: I have reviewed all pertinent imaging results/findings within the past 24 hours.

## 2024-11-07 NOTE — PT/OT/SLP EVAL
Speech Language Pathology Evaluation  Bedside Swallow Treatment     Patient Name:  Theresa Hook   MRN:  [id number]  Admitting Diagnosis: Moderate dementia, unspecified dementia type, unspecified whether behavioral, psychotic, or mood disturbance or anxiety    Recommendations:                 General Recommendations:   Dysphagia therapy no longer warranted at this time  Diet recommendations:  Regular Diet - IDDSI Level 7, Other (Comment) (meds whole in pudding. Supervision/assistance with all meals), Thin   Aspiration Precautions: 1 bite/sip at a time, Alternating bites/sips, Assistance with meals, Eliminate distractions, and Feed only when awake/alert   General Precautions: Standard, other (see comments) (Meds whole in pudding, supervision/asssitance during all meals)    Assessment:     Theresa Hook is a 84 y.o. female who was admitted to the ED for stroke like symptoms. Family and caregiver reported that swallowing difficulties began about a week ago in addition to change in cognitive status and R sided weakness. Prior to stroke like symptoms, it was noted that patient ate and drank independently w/o s/s of aspiration. Caregiver reported some coughing with thin liquids while taking medications at home in the past week. It was reported that patient has exhibited decreased interest in eating and drinking. Caregiver reported that patient primarily eats sweet foods at home.     Patient presented with increased mentation, expressive language, and strength. Upon arrival, patient was seated upright at 90 in bed reading a magazine. Caregiver reported that her functioning today is her baseline. It was also reported that patient has been refusing to eat pureed meals. ST ordered regular diet tray and caregiver brought patient fried chicken strips to trial in therapy. Patient motivated and participated in all PO trials when presented with preferred, familiar foods. No overt s/s of aspiration noted with thin  liquids or fried chicken. Prolonged mastication noted 2' to presbyphagia. Adequate hyolarngeal excursion noted when cued and given tactile feedback. All other aspects of swallow present as WFL at this time. Caregiver reported eye watering with pill administration. Patient has not responded well to crushed meds in pudding 2' to bad taste. ST informed RN and caregiver to attempt administering medication whole in pudding. It is recommended that patient be placed on a regular textured diet with thin liquids at this time. ST also encouraged caregiver to bring in preferred foods if this will encourage PO intake given the patient's diagnosis of moderate dementia. ST will discharge patient at this time. If any concerns arise, RN and caregiver were encouraged to request ST for re consult.        Subjective   Patient was seated upright in bed, expressed pleasant disposition, and oriented to task. She was accompanied by her caregiver and home health aid.     Pain/Comfort:  Patient did not disclose any information regarding discomfort during treatment.     Respiratory Status: Room air    Objective:     Oral Musculature Evaluation  Oral Musculature: WFL  Structural Abnormalities: None observed  Dentition: present and adequate  Mucosal Quality: good  Mandibular Strength and Mobility: WFL  Oral Labial Strength and Mobility: WFL  Lingual Strength and Mobility: WFL  Buccal Strength and Mobility: WFL  Volitional Swallow: WFL  Voice Prior to PO Intake: WFL    Bedside Swallow:   Consistencies Assessed:  Thin liquids 3 oz tea by straw  Solids 1 oz fried chicken with fork     - Patient was able to self feed provided supervision and intermittent assistance.     Oral Phase:   Solid: Prolonged mastication 2' to presbyphagia  Thin: WFL    Pharyngeal Phase:   no overt clinical signs/symptoms of aspiration  no overt clinical signs/symptoms of pharyngeal dysphagia    Compensatory Strategies  None    Goals:   Multidisciplinary Problems       SLP  Goals          Problem: SLP    Goal Priority Disciplines Outcome   SLP Goal     SLP Met   Description: Patients will maintain adequate hydration/nutrition with optimum safety and efficiency of swallowing function on P.O. intake without overt signs and symptoms of aspiration for the highest appropriate diet level.                        Plan:     Patient to be seen:  3 x/week   Plan of Care reviewed with:  patient, caregiver   SLP Follow-Up:  No       Discharge recommendations:  No Therapy Indicated   Barriers to Discharge:  None    Time Tracking:     SLP Treatment Date:   11/07/24  Speech Start Time:  1240  Speech Stop Time:  1300     Speech Total Time (min):  20 min    Billable Minutes: Speech Therapy Individual 20 minutes    11/07/2024

## 2024-11-07 NOTE — PLAN OF CARE
No acute events overnight. Pt VSS. Purposeful rounding complete. All questions answered. Call light within reach. Bed in locked and low position.     Problem: Stroke, Ischemic (Includes Transient Ischemic Attack)  Goal: Optimal Coping  Outcome: Progressing  Goal: Effective Bowel Elimination  Outcome: Progressing  Goal: Optimal Cerebral Tissue Perfusion  Outcome: Progressing  Goal: Optimal Cognitive Function  Outcome: Progressing  Goal: Improved Communication Skills  Outcome: Progressing  Goal: Optimal Functional Ability  Outcome: Progressing  Goal: Optimal Nutrition Intake  Outcome: Progressing  Goal: Effective Oxygenation and Ventilation  Outcome: Progressing  Goal: Improved Sensorimotor Function  Outcome: Progressing  Goal: Safe and Effective Swallow  Outcome: Progressing  Goal: Effective Urinary Elimination  Outcome: Progressing     Problem: Fall Injury Risk  Goal: Absence of Fall and Fall-Related Injury  Outcome: Progressing     Problem: Adult Inpatient Plan of Care  Goal: Plan of Care Review  Outcome: Progressing  Goal: Patient-Specific Goal (Individualized)  Outcome: Progressing  Goal: Absence of Hospital-Acquired Illness or Injury  Outcome: Progressing  Goal: Optimal Comfort and Wellbeing  Outcome: Progressing  Goal: Readiness for Transition of Care  Outcome: Progressing     Problem: Infection  Goal: Absence of Infection Signs and Symptoms  Outcome: Progressing     Problem: Skin Injury Risk Increased  Goal: Skin Health and Integrity  Outcome: Progressing

## 2024-11-07 NOTE — PLAN OF CARE
[facility]. Linette [phone] was contacted and she is calling the daughter in [place] to explain their care options.   11/07/24 0911   Post-Acute Status   Post-Acute Authorization Placement   Post-Acute Placement Status Set-up Complete/Auth obtained   Hospice Status Set-up Complete/Auth obtained   Discharge Delays None known at this time   Discharge Plan   Discharge Plan A Hospice/home   Discharge Plan B Hospice/home

## 2024-11-08 VITALS
SYSTOLIC BLOOD PRESSURE: 158 MMHG | OXYGEN SATURATION: 96 % | TEMPERATURE: 99 F | RESPIRATION RATE: 17 BRPM | HEIGHT: 67 IN | HEART RATE: 74 BPM | WEIGHT: 151 LBS | BODY MASS INDEX: 23.7 KG/M2 | DIASTOLIC BLOOD PRESSURE: 77 MMHG

## 2024-11-08 PROCEDURE — 25000003 PHARM REV CODE 250: Performed by: NURSE PRACTITIONER

## 2024-11-08 PROCEDURE — G0378 HOSPITAL OBSERVATION PER HR: HCPCS

## 2024-11-08 PROCEDURE — 99238 HOSP IP/OBS DSCHRG MGMT 30/<: CPT | Mod: ,,, | Performed by: STUDENT IN AN ORGANIZED HEALTH CARE EDUCATION/TRAINING PROGRAM

## 2024-11-08 PROCEDURE — 94761 N-INVAS EAR/PLS OXIMETRY MLT: CPT

## 2024-11-08 PROCEDURE — 97116 GAIT TRAINING THERAPY: CPT

## 2024-11-08 RX ORDER — QUETIAPINE FUMARATE 25 MG/1
25 TABLET, FILM COATED ORAL NIGHTLY
Qty: 90 TABLET | Refills: 0 | Status: SHIPPED | OUTPATIENT
Start: 2024-11-08 | End: 2025-02-06

## 2024-11-08 RX ADMIN — AMIODARONE HYDROCHLORIDE 200 MG: 100 TABLET ORAL at 09:11

## 2024-11-08 RX ADMIN — MEMANTINE HYDROCHLORIDE 5 MG: 5 TABLET ORAL at 09:11

## 2024-11-08 RX ADMIN — APIXABAN 2.5 MG: 2.5 TABLET, FILM COATED ORAL at 09:11

## 2024-11-08 RX ADMIN — PANTOPRAZOLE SODIUM 40 MG: 40 TABLET, DELAYED RELEASE ORAL at 09:11

## 2024-11-08 RX ADMIN — CLOTRIMAZOLE AND BETAMETHASONE DIPROPIONATE: 10; .5 CREAM TOPICAL at 09:11

## 2024-11-08 RX ADMIN — ATORVASTATIN CALCIUM 40 MG: 40 TABLET, FILM COATED ORAL at 09:11

## 2024-11-08 NOTE — DISCHARGE SUMMARY
[facility] Medicine  Discharge Summary      Patient Name: Theresa Hook  MRN: [id number]  GERSON: [id number]  Patient Class: OP- Observation  Admission Date: 2024  Hospital Length of Stay: 0 days  Discharge Date and Time:  2024 10:16 AM  Attending Physician: Tomy Cage MD   Discharging Provider: Tomy Cage MD  Primary Care Provider: Josette Lino MD    Primary Care Team: Networked reference to record PCT     HPI:   Theresa Hook is an 84-year-old female with a PMHx of HTN, gout, [place]. Dementia, PAF on eliquis, stroke, frequent UTI who presented to the ED for the evaluation of possible UTI. Family at bedside report about Thursday or Friday was last normal self. Has dementia but nothing out of the ordinary. Says Saturday is when she was walking around without clothes on, wanting to go places. Says her urine has a foul odor and can smell it in the other room. Positive for pain on urination. Last few days they have been breaking up pills due to her trouble swallowing. Says she has a rolling walker and lately she has not been able to ambulate and looks fatigue. Says he has trouble lifting up the right leg and cannot lifting right arm. Says she been having trouble with her right shoulder and arm for the past 2 months. Says she has always had issues with her foot and went to a specialist and they said the ligament is almost about. Says they give her water constantly, she drinks the body armor drinks and sugar free root bear, ensure. She will only really eat breakfast. Says yesterday they attempted to take her to urgent care but she refused to get out of the car. Says she recently was placed on tramadol once a day for pain about 3 weeks ago. They report she's been hallucinating seeing  family member. Denies any fevers, chills, abdominal pain, changes in bowel or bladder frequency.    In the ED, labs pretty unremarkable. CTH: No CT evidence of acute  intracranial abnormality. Clinical correlation and further evaluation as warranted. Chronic senescent and microvascular ischemic changes. Right knee XRY: Mild tricompartmental degenerative osteoarthrosis with small osteophyte formation. Joint spaces are preserved. Normal alignment. No significant suprapatellar effusion. Per ED provider, patient needing admission for stroke workup and deconditioning and MRI. Will admit for further monitoring and treatment.    * No surgery found *      Hospital Course:   Patient admitted for acute delirium in setting of advanced dementia. CVA ruled out. 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 Discussed with radiology who feel this a chronic finding given lack of acute CVA on MRI. Ruled out infection. Started PO seroquel which greatly improved patient's mental status and she is back near her cognitive baseline. Explained to family that she is likely having advancing dementia and that situations like this will become more common. They are understanding of this and would like to pursue home with hospice. This has been set up for patient. She has excellent family support at home and should do well. Will continue seroquel. Advised family that if patient develops excessive sedation on medication to notify PCP or that they can bring her in for evaluation. They have decided to continue with eliquis for patient for stroke prevention. Explained risks and benefits to patient's family and it is my opinion that they have good understanding. Patient has received maximum benefit from inpatient stay and is medically clear for discharge. Provided discharge instructions and return precautions.       Goals of Care Treatment Preferences:  Code Status: Full Code      SDOH Screening:  The patient was screened for utility difficulties, food insecurity, transport difficulties, housing insecurity, and interpersonal safety and there were no concerns identified this admission.     Consults:   Consults  (From admission, onward)          Status Ordering Provider     Inpatient consult to Case Management  Once        Provider:  (Not yet assigned)    Completed QUINTON MOON     Inpatient consult to Physical Medicine Rehab  Once        Provider:  (Not yet assigned)    Acknowledged ROBB YANEZ     Inpatient consult to Registered Dietitian/Nutritionist  Once        Provider:  (Not yet assigned)    Completed ROBB YANEZ     IP consult to case management/social work  Once        Provider:  (Not yet assigned)    Completed ROBB YANEZ            No new Assessment & Plan notes have been filed under this hospital service since the last note was generated.  Service: Hospital Medicine    Final Active Diagnoses:    Diagnosis Date Noted POA    PRINCIPAL PROBLEM:  Moderate dementia, unspecified dementia type, unspecified whether behavioral, psychotic, or mood disturbance or anxiety [F03.B0] 12/01/2023 Yes    Stroke-like symptoms [R29.90] 11/04/2024 Yes    Gastroesophageal reflux disease [K21.9] 04/06/2022 Yes    Coronary artery disease of native artery of native heart with stable angina pectoris [I25.118] 02/26/2019 Yes    Gait instability [R26.81] 09/13/2017 Yes    Paroxysmal atrial fibrillation [I48.0] 04/25/2017 Yes    HTN (hypertension) [I10] 08/14/2012 Yes     Chronic      Problems Resolved During this Admission:    Diagnosis Date Noted Date Resolved POA    Acute encephalopathy [G93.40] 11/04/2024 11/04/2024 Yes       Discharged Condition: good    Disposition: Home or Self Care    Follow Up:    Patient Instructions:      Diet Adult Regular     Notify your health care provider if you experience any of the following:  increased confusion or weakness     Notify your health care provider if you experience any of the following:  persistent dizziness, light-headedness, or visual disturbances     Activity as tolerated       Significant Diagnostic Studies: N/A    Pending Diagnostic Studies:       None            Medications:  Reconciled Home Medications:      Medication List        START taking these medications      QUEtiapine 25 MG Tab  Commonly known as: SEROQUEL  Take 1 tablet (25 mg total) by mouth every evening.            CONTINUE taking these medications      amiodarone 200 MG Tab  Commonly known as: PACERONE  Take by mouth once daily.     apixaban 2.5 mg Tab  Commonly known as: ELIQUIS  Take 2.5 mg by mouth 2 (two) times daily.     b complex vitamins tablet  Take 1 tablet by mouth once daily.     esomeprazole 20 MG capsule  Commonly known as: NEXIUM  Take 20 mg by mouth once daily.     furosemide 20 MG tablet  Commonly known as: LASIX  Take 20 mg by mouth once daily.     memantine 5 MG Tab  Commonly known as: NAMENDA  Take 5 mg by mouth.     pravastatin 40 MG tablet  Commonly known as: PRAVACHOL  Take 1 tablet (40 mg total) by mouth once daily.     ULTRA-LIGHT ROLLATOR MISC  Rollator, See Instructions, use for ambulation-69., # 1 EA, 0 Refill(s)            STOP taking these medications      gabapentin 300 MG capsule  Commonly known as: NEURONTIN     traMADoL 50 mg tablet  Commonly known as: ULTRAM              Indwelling Lines/Drains at time of discharge:   Lines/Drains/Airways       None                   Time spent on the discharge of patient: 15 minutes         Tomy Cage MD  Department of Hospital Medicine  [facility]

## 2024-11-08 NOTE — NURSING
Patient D/C home with caregiver by personal vehicle. [facility] set up by family and social work no concerns at this time.

## 2024-11-08 NOTE — PLAN OF CARE
Patient will discharge home with her caregiver Yaneth or Sapna and will be transported home by them. [facility] has accepted the patient and will follow her care today 11/8/2024. Patient has no further needs. Sw spoke to her caregiver earlier today. All updated swallow study and progress notes from doctor have been sent to [place] [facility]. No further case management needs. Patient is ready to discharge.   11/08/24 1041   Final Note   Assessment Type Final Discharge Note   Anticipated Discharge Disposition HospiceHome   Post-Acute Status   Post-Acute Authorization Hospice   Hospice Status Set-up Complete/Auth obtained   Patient choice form signed by patient/caregiver List from CMS Compare;List with quality metrics by geographic area provided;List from System Post-Acute Care   Discharge Delays (!) Change in Medical Condition

## 2024-11-08 NOTE — PLAN OF CARE
Problem: Stroke, Ischemic (Includes Transient Ischemic Attack)  Goal: Optimal Coping  Outcome: Progressing  Goal: Effective Bowel Elimination  Outcome: Progressing  Goal: Optimal Cerebral Tissue Perfusion  Outcome: Progressing  Goal: Optimal Cognitive Function  Outcome: Progressing  Goal: Improved Communication Skills  Outcome: Progressing  Goal: Optimal Functional Ability  Outcome: Progressing  Goal: Optimal Nutrition Intake  Outcome: Progressing  Goal: Effective Oxygenation and Ventilation  Outcome: Progressing  Goal: Improved Sensorimotor Function  Outcome: Progressing  Goal: Safe and Effective Swallow  Outcome: Progressing  Goal: Effective Urinary Elimination  Outcome: Progressing     Problem: Fall Injury Risk  Goal: Absence of Fall and Fall-Related Injury  Outcome: Progressing     Problem: Adult Inpatient Plan of Care  Goal: Plan of Care Review  Outcome: Progressing  Goal: Patient-Specific Goal (Individualized)  Outcome: Progressing  Goal: Absence of Hospital-Acquired Illness or Injury  Outcome: Progressing  Goal: Optimal Comfort and Wellbeing  Outcome: Progressing  Goal: Readiness for Transition of Care  Outcome: Progressing     Problem: Infection  Goal: Absence of Infection Signs and Symptoms  Outcome: Progressing     Problem: Skin Injury Risk Increased  Goal: Skin Health and Integrity  Outcome: Progressing

## 2024-11-08 NOTE — PLAN OF CARE
Problem: Adult Inpatient Plan of Care  Goal: Plan of Care Review  Outcome: Progressing     Problem: Adult Inpatient Plan of Care  Goal: Optimal Comfort and Wellbeing  Outcome: Progressing     Problem: Fall Injury Risk  Goal: Absence of Fall and Fall-Related Injury  Outcome: Progressing     Problem: Skin Injury Risk Increased  Goal: Skin Health and Integrity  Outcome: Progressing      Plan of care reviewed and acknowledged with patient. Had no major complaints that were not handled with PRN/scheduled medications. Bed in low, locked position. Call light within easy reach. Instructed to call for needs and assistance. Frequent checks made to maintain safety, comfort, positioning, bathroom needs, and pain.

## 2024-11-08 NOTE — HOSPITAL COURSE
Patient admitted for acute delirium in setting of advanced dementia. CVA ruled out. 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 Discussed with radiology who feel this a chronic finding given lack of acute CVA on MRI. Ruled out infection. Started PO seroquel which greatly improved patient's mental status and she is back near her cognitive baseline. Explained to family that she is likely having advancing dementia and that situations like this will become more common. They are understanding of this and would like to pursue home with hospice. This has been set up for patient. She has excellent family support at home and should do well. Will continue seroquel. Advised family that if patient develops excessive sedation on medication to notify PCP or that they can bring her in for evaluation. They have decided to continue with eliquis for patient for stroke prevention. Explained risks and benefits to patient's family and it is my opinion that they have good understanding. Patient has received maximum benefit from inpatient stay and is medically clear for discharge. Provided discharge instructions and return precautions.

## 2024-11-08 NOTE — PT/OT/SLP PROGRESS
Physical Therapy Treatment    Patient Name:  Theresa Hook   MRN:  [id number]    Recommendations:     Discharge Recommendations: Moderate Intensity Therapy (SNF)  Discharge Equipment Recommendations: wheelchair  Barriers to discharge:  Ongoing medical treatment    Assessment:     Theresa Hook is a 84 y.o. female admitted with a medical diagnosis of Moderate dementia, unspecified dementia type, unspecified whether behavioral, psychotic, or mood disturbance or anxiety.  She presents with the following impairments/functional limitations: weakness, impaired functional mobility, gait instability, impaired balance, impaired cognition, decreased safety awareness.    Rehab Prognosis: Good; patient would benefit from acute skilled PT services to address these deficits and reach maximum level of function.    Recent Surgery: * No surgery found *      Plan:     During this hospitalization, patient to be seen 5 x/week to address the identified rehab impairments via gait training, therapeutic activities, therapeutic exercises and progress toward the following goals:    Plan of Care Expires:   (Upon hospital discharge)    Subjective     Chief Complaint: The patient did not have any complaints.  Patient/Family Comments/goals: None stated  Pain/Comfort:  Pain Rating 1: 0/10      Objective:     Communicated with nurse prior to session.  Patient found supine with telemetry, peripheral IV (sitter) upon PT entry to room.     General Precautions: Standard, fall  Orthopedic Precautions: N/A  Braces: N/A  Respiratory Status: Room air     Treatment & Education:  The patient received supine <> sit transfer training with min assist.  The patient received sit to stand transfer training from the bed to the rollator with min assist.  She received gait training approximately 100 feet with rolling walker and minimal assistance while walking straight and moderate assistance for turns . Patient continues to be unsteady and needs help  guiding the rollator.    Patient left supine with all lines intact, call button in reach, bed alarm on, nurse notified, and caregiver present..    GOALS:   Multidisciplinary Problems       Physical Therapy Goals          Problem: Physical Therapy    Goal Priority Disciplines Outcome Interventions   Physical Therapy Goal     PT, PT/OT     Description: Goals    1.  Patient to be independent with bed mobility.  2.  Patient to transfer bed <> chair with supervision.  3.  Patient to ambulate with a rolling walker and CGA approximately 100 feet.                       Time Tracking:     PT Received On: 11/08/24  PT Start Time: 1105     PT Stop Time: 1120  PT Total Time (min): 15 min     Billable Minutes: Gait Training 15    Treatment Type: Treatment  PT/PTA: PT     Number of PTA visits since last PT visit: 0     11/08/2024

## 2024-11-12 ENCOUNTER — TELEPHONE (OUTPATIENT)
Dept: FAMILY MEDICINE | Facility: CLINIC | Age: 84
End: 2024-11-12
Payer: MEDICARE

## 2024-11-12 NOTE — TELEPHONE ENCOUNTER
"----- Message from Rox sent at 11/12/2024  8:57 AM CST -----  Regarding: CANCELLED PHYSICAL THERAPY EVAL  Good day to you:    The Physical/Occupational Therapy Department received your order to get the attached patient scheduled for an evaluation. We have reached out to the patient and scheduled them for an evaluation; however, they have cancelled their appointment.We have made several attempts to get the patient rescheduled but have been unsuccessful.    We wanted you to be aware that your patient has not started therapy. If you speak with them again about starting therapy please have them reach out to us at [phone] to get scheduled?.    Best regards,  Rox "Ms. Deleon" Moiz  Access Navigator  [phone] FAX: [phone]  [email or web address]  "

## 2024-12-12 NOTE — TELEPHONE ENCOUNTER
Informed pt we haven't received her UTI results. Gave pt our fax number [phone] to be given to [facility]. Pt voiced understanding and call ended.   Pt on Bipap, DNR, gaxiola in place no signs of bleeding,pt on Heparin drip [id number] in 250ml at 10ml/hr,1000units/hr No